# Patient Record
Sex: MALE | Race: WHITE | NOT HISPANIC OR LATINO | Employment: OTHER | URBAN - METROPOLITAN AREA
[De-identification: names, ages, dates, MRNs, and addresses within clinical notes are randomized per-mention and may not be internally consistent; named-entity substitution may affect disease eponyms.]

---

## 2017-03-28 NOTE — PATIENT INSTRUCTIONS
[facility]    If you have any questions regarding to your visit please contact your care team:       Team Red:   Clinic Hours Telephone Number   Dr. Dary Jones  (pediatrics)  Lyly Duarte NP 7am-7pm  Monday - Thursday   7am-5pm  Fridays  [phone] (fax)    Artemio CRAIG  [phone]   Urgent Care - [place] and [place] Monday-Friday  [place] - 11am-8pm  Saturday-Sunday  Both sites - 9am-5pm  [phone] - [facility]  [phone] - [place]       What options do I have for visits at the clinic other than the traditional office visit?  To expand how we care for you, many of our providers are utilizing electronic visits (e-visits) and telephone visits, when medically appropriate, for interactions with their patients rather than a visit in the clinic.   We also offer nurse visits for many medical concerns. Just like any other service, we will bill your insurance company for this type of visit based on time spent on the phone with your provider. Not all insurance companies cover these visits. Please check with your medical insurance if this type of visit is covered. You will be responsible for any charges that are not paid by your insurance.      E-visits via [employer]:  generally incur a $35.00 fee.  Telephone visits:  Time spent on the phone: *charged based on time that is spent on the phone in increments of 10 minutes. Estimated cost:   5-10 mins $30.00   11-20 mins. $59.00   21-30 mins. $85.00     As always, Thank you for trusting us with your health care needs!    Dante Jarrell    Preventive Health Recommendations:   Male Ages 65 and over    Yearly exam:             See your health care provider every year in order to  o   Review health changes.   o   Discuss preventive care.    o   Review your medicines if your doctor has prescribed any.    Talk with your health care provider about whether you should have a test to screen for  prostate cancer (PSA).    Every 3 years, have a diabetes test (fasting glucose). If you are at risk for diabetes, you should have this test more often.    Every 5 years, have a cholesterol test. Have this test more often if you are at risk for high cholesterol or heart disease.     Every 10 years, have a colonoscopy. Or, have a yearly FIT test (stool test). These exams will check for colon cancer.    Talk to with your health care provider about screening for Abdominal Aortic Aneurysm if you have a family history of AAA or have a history of smoking.    Shots:     Get a flu shot each year.     Get a tetanus shot every 10 years.     Talk to your doctor about your pneumonia vaccines. There are now two you should receive - Pneumovax (PPSV 23) and Prevnar (PCV 13).     Talk to your doctor about a shingles vaccine.     Talk to your doctor about the hepatitis B vaccine.  Nutrition:     Eat at least 5 servings of fruits and vegetables each day.     Eat whole-grain bread, whole-wheat pasta and brown rice instead of white grains and rice.     Talk to your provider about Calcium and Vitamin D.   Lifestyle    Exercise for at least 150 minutes a week (30 minutes a day, 5 days a week). This will help you control your weight and prevent disease.     Limit alcohol to one drink per day.     No smoking.     Wear sunscreen to prevent skin cancer.     See your dentist every six months for an exam and cleaning.     See your eye doctor every 1 to 2 years to screen for conditions such as glaucoma, macular degeneration, cataracts, etc   Preventive Health Recommendations:       Male Ages 65 and over    Yearly exam:             See your health care provider every year in order to  o   Review health changes.   o   Discuss preventive care.    o   Review your medicines if your doctor has prescribed any.  Talk with your health care provider about whether you should have a test to screen for prostate cancer (PSA).  Every 3 years, have a diabetes  test (fasting glucose). If you are at risk for diabetes, you should have this test more often.  Every 5 years, have a cholesterol test. Have this test more often if you are at risk for high cholesterol or heart disease.   Every 10 years, have a colonoscopy. Or, have a yearly FIT test (stool test). These exams will check for colon cancer.  Talk to with your health care provider about screening for Abdominal Aortic Aneurysm if you have a family history of AAA or have a history of smoking.  Shots:   Get a flu shot each year.   Get a tetanus shot every 10 years.   Talk to your doctor about your pneumonia vaccines. There are now two you should receive - Pneumovax (PPSV 23) and Prevnar (PCV 13).  Talk to your doctor about a shingles vaccine.   Talk to your doctor about the hepatitis B vaccine.  Nutrition:   Eat at least 5 servings of fruits and vegetables each day.   Eat whole-grain bread, whole-wheat pasta and brown rice instead of white grains and rice.   Talk to your doctor about Calcium and Vitamin D.   Lifestyle  Exercise for at least 150 minutes a week (30 minutes a day, 5 days a week). This will help you control your weight and prevent disease.   Limit alcohol to one drink per day.   No smoking.   Wear sunscreen to prevent skin cancer.   See your dentist every six months for an exam and cleaning.   See your eye doctor every 1 to 2 years to screen for conditions such as glaucoma, macular degeneration and cataracts.

## 2017-03-29 ENCOUNTER — OFFICE VISIT (OUTPATIENT)
Dept: FAMILY MEDICINE | Facility: CLINIC | Age: 67
End: 2017-03-29
Payer: COMMERCIAL

## 2017-03-29 VITALS
SYSTOLIC BLOOD PRESSURE: 124 MMHG | WEIGHT: 178 LBS | DIASTOLIC BLOOD PRESSURE: 80 MMHG | OXYGEN SATURATION: 100 % | BODY MASS INDEX: 23.59 KG/M2 | HEART RATE: 60 BPM | RESPIRATION RATE: 14 BRPM | HEIGHT: 73 IN | TEMPERATURE: 96.3 F

## 2017-03-29 DIAGNOSIS — N40.1 HYPERTROPHY OF PROSTATE WITH URINARY OBSTRUCTION: ICD-10-CM

## 2017-03-29 DIAGNOSIS — E78.5 HYPERLIPIDEMIA LDL GOAL <130: ICD-10-CM

## 2017-03-29 DIAGNOSIS — K21.9 GASTROESOPHAGEAL REFLUX DISEASE WITHOUT ESOPHAGITIS: ICD-10-CM

## 2017-03-29 DIAGNOSIS — Z00.00 ROUTINE GENERAL MEDICAL EXAMINATION AT A HEALTH CARE FACILITY: Primary | ICD-10-CM

## 2017-03-29 DIAGNOSIS — N13.8 HYPERTROPHY OF PROSTATE WITH URINARY OBSTRUCTION: ICD-10-CM

## 2017-03-29 DIAGNOSIS — I10 HYPERTENSION GOAL BP (BLOOD PRESSURE) < 140/90: ICD-10-CM

## 2017-03-29 LAB
ALBUMIN UR-MCNC: NEGATIVE MG/DL
ANION GAP SERPL CALCULATED.3IONS-SCNC: 2 MMOL/L (ref 3–14)
APPEARANCE UR: CLEAR
BILIRUB UR QL STRIP: NEGATIVE
BUN SERPL-MCNC: 23 MG/DL (ref 7–30)
CALCIUM SERPL-MCNC: 9.2 MG/DL (ref 8.5–10.1)
CHLORIDE SERPL-SCNC: 103 MMOL/L (ref 94–109)
CO2 SERPL-SCNC: 35 MMOL/L (ref 20–32)
COLOR UR AUTO: YELLOW
CREAT SERPL-MCNC: 0.84 MG/DL (ref 0.66–1.25)
GFR SERPL CREATININE-BSD FRML MDRD: ABNORMAL ML/MIN/1.7M2
GLUCOSE SERPL-MCNC: 107 MG/DL (ref 70–99)
GLUCOSE UR STRIP-MCNC: NEGATIVE MG/DL
HGB UR QL STRIP: NEGATIVE
KETONES UR STRIP-MCNC: NEGATIVE MG/DL
LEUKOCYTE ESTERASE UR QL STRIP: NEGATIVE
NITRATE UR QL: NEGATIVE
PH UR STRIP: 7 PH (ref 5–7)
POTASSIUM SERPL-SCNC: 4.3 MMOL/L (ref 3.4–5.3)
SODIUM SERPL-SCNC: 140 MMOL/L (ref 133–144)
SP GR UR STRIP: 1.01 (ref 1–1.03)
URN SPEC COLLECT METH UR: NORMAL
UROBILINOGEN UR STRIP-ACNC: 0.2 EU/DL (ref 0.2–1)

## 2017-03-29 PROCEDURE — 99397 PER PM REEVAL EST PAT 65+ YR: CPT | Performed by: FAMILY MEDICINE

## 2017-03-29 PROCEDURE — 80048 BASIC METABOLIC PNL TOTAL CA: CPT | Performed by: FAMILY MEDICINE

## 2017-03-29 PROCEDURE — 36415 COLL VENOUS BLD VENIPUNCTURE: CPT | Performed by: FAMILY MEDICINE

## 2017-03-29 PROCEDURE — 81003 URINALYSIS AUTO W/O SCOPE: CPT | Performed by: FAMILY MEDICINE

## 2017-03-29 RX ORDER — SIMVASTATIN 10 MG
10 TABLET ORAL AT BEDTIME
Qty: 90 TABLET | Refills: 3 | Status: SHIPPED | OUTPATIENT
Start: 2017-03-29 | End: 2018-03-20

## 2017-03-29 RX ORDER — ATENOLOL 25 MG/1
25 TABLET ORAL DAILY
Qty: 90 TABLET | Refills: 3 | Status: SHIPPED | OUTPATIENT
Start: 2017-03-29 | End: 2018-03-20

## 2017-03-29 RX ORDER — HYDROCHLOROTHIAZIDE 25 MG/1
25 TABLET ORAL DAILY
Qty: 90 TABLET | Refills: 3 | Status: SHIPPED | OUTPATIENT
Start: 2017-03-29 | End: 2018-03-20

## 2017-03-29 RX ORDER — POTASSIUM CHLORIDE 750 MG/1
10 TABLET, EXTENDED RELEASE ORAL DAILY
Qty: 90 TABLET | Refills: 3 | Status: SHIPPED | OUTPATIENT
Start: 2017-03-29 | End: 2018-03-20

## 2017-03-29 RX ORDER — FINASTERIDE 5 MG/1
5 TABLET, FILM COATED ORAL DAILY
Qty: 90 TABLET | Refills: 3 | Status: CANCELLED | OUTPATIENT
Start: 2017-03-29

## 2017-03-29 NOTE — MR AVS SNAPSHOT
After Visit Summary   3/29/2017    Steve José    MRN: [id number]           Patient Information     Date Of Birth          1950        Visit Information        Provider Department      3/29/2017 8:00 AM Bita Deluca MD [facility]        Today's Diagnoses     Hypertrophy of prostate with urinary obstruction        Hyperlipidemia LDL goal <130        Hypertension goal BP (blood pressure) < 140/90        Gastroesophageal reflux disease without esophagitis          Care Instructions    [facility]    If you have any questions regarding to your visit please contact your care team:       Team Red:   Clinic Hours Telephone Number   Dr. Dary Jones  (pediatrics)  Lyly Duarte NP 7am-7pm  Monday - Thursday   7am-5pm  Fridays  [phone] (fax)    Artemio CRAIG  [phone]   Urgent Care - [place] and [place] Monday-Friday  [place] - 11am-8pm  Saturday-Sunday  Both sites - 9am-5pm  [phone] - [facility]  [phone] - [place]       What options do I have for visits at the clinic other than the traditional office visit?  To expand how we care for you, many of our providers are utilizing electronic visits (e-visits) and telephone visits, when medically appropriate, for interactions with their patients rather than a visit in the clinic.   We also offer nurse visits for many medical concerns. Just like any other service, we will bill your insurance company for this type of visit based on time spent on the phone with your provider. Not all insurance companies cover these visits. Please check with your medical insurance if this type of visit is covered. You will be responsible for any charges that are not paid by your insurance.      E-visits via [employer]:  generally incur a $35.00 fee.  Telephone visits:  Time spent on the phone: *charged based on time that is spent on the phone in increments of 10 minutes.  Estimated cost:   5-10 mins $30.00   11-20 mins. $59.00   21-30 mins. $85.00     As always, Thank you for trusting us with your health care needs!    Dante Jarrell    Preventive Health Recommendations:   Male Ages 65 and over    Yearly exam:             See your health care provider every year in order to  o   Review health changes.   o   Discuss preventive care.    o   Review your medicines if your doctor has prescribed any.    Talk with your health care provider about whether you should have a test to screen for prostate cancer (PSA).    Every 3 years, have a diabetes test (fasting glucose). If you are at risk for diabetes, you should have this test more often.    Every 5 years, have a cholesterol test. Have this test more often if you are at risk for high cholesterol or heart disease.     Every 10 years, have a colonoscopy. Or, have a yearly FIT test (stool test). These exams will check for colon cancer.    Talk to with your health care provider about screening for Abdominal Aortic Aneurysm if you have a family history of AAA or have a history of smoking.    Shots:     Get a flu shot each year.     Get a tetanus shot every 10 years.     Talk to your doctor about your pneumonia vaccines. There are now two you should receive - Pneumovax (PPSV 23) and Prevnar (PCV 13).     Talk to your doctor about a shingles vaccine.     Talk to your doctor about the hepatitis B vaccine.  Nutrition:     Eat at least 5 servings of fruits and vegetables each day.     Eat whole-grain bread, whole-wheat pasta and brown rice instead of white grains and rice.     Talk to your provider about Calcium and Vitamin D.   Lifestyle    Exercise for at least 150 minutes a week (30 minutes a day, 5 days a week). This will help you control your weight and prevent disease.     Limit alcohol to one drink per day.     No smoking.     Wear sunscreen to prevent skin cancer.     See your dentist every six months for an exam and cleaning.     See  your eye doctor every 1 to 2 years to screen for conditions such as glaucoma, macular degeneration, cataracts, etc   Preventive Health Recommendations:       Male Ages 65 and over    Yearly exam:             See your health care provider every year in order to  o   Review health changes.   o   Discuss preventive care.    o   Review your medicines if your doctor has prescribed any.  Talk with your health care provider about whether you should have a test to screen for prostate cancer (PSA).  Every 3 years, have a diabetes test (fasting glucose). If you are at risk for diabetes, you should have this test more often.  Every 5 years, have a cholesterol test. Have this test more often if you are at risk for high cholesterol or heart disease.   Every 10 years, have a colonoscopy. Or, have a yearly FIT test (stool test). These exams will check for colon cancer.  Talk to with your health care provider about screening for Abdominal Aortic Aneurysm if you have a family history of AAA or have a history of smoking.  Shots:   Get a flu shot each year.   Get a tetanus shot every 10 years.   Talk to your doctor about your pneumonia vaccines. There are now two you should receive - Pneumovax (PPSV 23) and Prevnar (PCV 13).  Talk to your doctor about a shingles vaccine.   Talk to your doctor about the hepatitis B vaccine.  Nutrition:   Eat at least 5 servings of fruits and vegetables each day.   Eat whole-grain bread, whole-wheat pasta and brown rice instead of white grains and rice.   Talk to your doctor about Calcium and Vitamin D.   Lifestyle  Exercise for at least 150 minutes a week (30 minutes a day, 5 days a week). This will help you control your weight and prevent disease.   Limit alcohol to one drink per day.   No smoking.   Wear sunscreen to prevent skin cancer.   See your dentist every six months for an exam and cleaning.   See your eye doctor every 1 to 2 years to screen for conditions such as glaucoma, macular  "degeneration and cataracts.        Follow-ups after your visit        Who to contact     If you have questions or need follow up information about today's clinic visit or your schedule please contact [facility] [place] directly at [phone].  Normal or non-critical lab and imaging results will be communicated to you by MyChart, letter or phone within 4 business days after the clinic has received the results. If you do not hear from us within 7 days, please contact the clinic through [employer] or phone. If you have a critical or abnormal lab result, we will notify you by phone as soon as possible.  Submit refill requests through [employer] or call your pharmacy and they will forward the refill request to us. Please allow 3 business days for your refill to be completed.          Additional Information About Your Visit        [employer] Information     [employer] gives you secure access to your electronic health record. If you see a primary care provider, you can also send messages to your care team and make appointments. If you have questions, please call your primary care clinic.  If you do not have a primary care provider, please call [phone] and they will assist you.        Care EveryWhere ID     This is your Care EveryWhere ID. This could be used by other organizations to access your [place] medical records  [id number]        Your Vitals Were     Pulse Temperature Respirations Height Pulse Oximetry BMI (Body Mass Index)    60 96.3  F (35.7  C) 14 6' 1\" (1.854 m) 100% 23.48 kg/m2       Blood Pressure from Last 3 Encounters:   03/29/17 124/80   10/06/16 130/80   05/19/16 136/78    Weight from Last 3 Encounters:   03/29/17 178 lb (80.7 kg)   10/06/16 182 lb (82.6 kg)   04/04/16 180 lb (81.6 kg)              We Performed the Following     *UA reflex to Microscopic and Culture ([place] and [facility] (except [place] and [place])     Basic metabolic panel          Where to get your medicines      These " medications were sent to [facility] [id number]  [place], [place] - [address]  [address] [place] [facility] [address]     Phone:  [phone]     atenolol 25 MG tablet    hydrochlorothiazide 25 MG tablet    potassium chloride SA 10 MEQ CR tablet    ranitidine 150 MG tablet    simvastatin 10 MG tablet          Primary Care Provider Office Phone # Fax #    Bita Deluca [phone]       [facility] [address] [address]        Thank you!     Thank you for choosing [facility]  for your care. Our goal is always to provide you with excellent care. Hearing back from our patients is one way we can continue to improve our services. Please take a few minutes to complete the written survey that you may receive in the mail after your visit with us. Thank you!             Your Updated Medication List - Protect others around you: Learn how to safely use, store and throw away your medicines at www.disposemymeds.org.          This list is accurate as of: 3/29/17  8:30 AM.  Always use your most recent med list.                   Brand Name Dispense Instructions for use    aspirin 81 MG tablet      Take 1 tablet by mouth daily.       atenolol 25 MG tablet    TENORMIN    90 tablet    Take 1 tablet (25 mg) by mouth daily       CALCIUM 600 + D PO      Take  by mouth daily.       finasteride 5 MG tablet    PROSCAR    90 tablet    Take 1 tablet (5 mg) by mouth daily       hydrochlorothiazide 25 MG tablet    HYDRODIURIL    90 tablet    Take 1 tablet (25 mg) by mouth daily       MULTI FOR HIM PO      Take  by mouth daily.       potassium chloride SA 10 MEQ CR tablet    K-DUR/KLOR-CON M    90 tablet    Take 1 tablet (10 mEq) by mouth daily       ranitidine 150 MG tablet    ZANTAC    180 tablet    Take 1 tablet (150 mg) by mouth 2 times daily       simvastatin 10 MG tablet    ZOCOR    90 tablet    Take 1 tablet (10 mg) by mouth At Bedtime at bedtime.

## 2017-03-29 NOTE — PROGRESS NOTES
SUBJECTIVE:                                                            Steve José is a 66 year old male who presents for Preventive Visit.      Are you in the first 12 months of your Medicare coverage?  No    Physical   Annual:     Getting at least 3 servings of Calcium per day::  Yes    Bi-annual eye exam::  Yes    Dental care twice a year::  Yes    Sleep apnea or symptoms of sleep apnea::  None    Diet::  Regular (no restrictions)    Frequency of exercise::  6-7 days/week    Duration of exercise::  Greater than 60 minutes    Taking medications regularly::  Yes    Medication side effects::  None    Additional concerns today::  No      COGNITIVE SCREEN  1) Repeat 3 items (Banana, Sunrise, Chair)    2) Clock draw: NORMAL  3) 3 item recall: Recalls 3 objects  Results: NORMAL clock, 1-2 items recalled: COGNITIVE IMPAIRMENT LESS LIKELY    Mini-CogTM Copyright RELL Mayes. Licensed by the author for use in [employer]; reprinted with permission (anirudh@[facility]). All rights reserved.        Reviewed and updated as needed this visit by clinical staff         Reviewed and updated as needed this visit by Provider        Social History   Substance Use Topics     Smoking status: Never Smoker     Smokeless tobacco: Never Used     Alcohol use 0.0 oz/week      Comment: rare       The patient does not drink >3 drinks per day nor >7 drinks per week.    Hyperlipidemia Follow-Up      Rate your low fat/cholesterol diet?: good    Taking statin?  Yes, no muscle aches from statin    Other lipid medications/supplements?:  none     Hypertension Follow-up      Outpatient blood pressures are not being checked.    Low Salt Diet: no added salt       Amount of exercise or physical activity: None    Problems taking medications regularly: No    Medication side effects: none    Diet: low salt        none    Today's PHQ-2 Score:   PHQ-2 ( 1999 Pfizer) 3/26/2017   Little interest or pleasure in doing things Not at all   Feeling down,  depressed or hopeless Not at all   PHQ-2 Score 0       Do you feel safe in your environment - Yes    Do you have a Health Care Directive?: Yes: Patient states has Advance Directive and will bring in a copy to clinic.    Current providers sharing in care for this patient include:   Patient Care Team:  Bita Deluca MD as PCP - General      Hearing impairment: No    Ability to successfully perform activities of daily living: Yes, no assistance needed     Fall risk:       Home safety:  none identified      The following health maintenance items are reviewed in Epic and correct as of today:  Health Maintenance   Topic Date Due     AORTIC ANEURYSM SCREENING (SYSTEM ASSIGNED)  05/09/2013     ADVANCE DIRECTIVE PLANNING Q5 YRS (NO INBASKET)  08/04/2016     INFLUENZA VACCINE (SYSTEM ASSIGNED)  09/01/2017     BMP Q1 YR (NO INBASKET)  10/06/2017     FALL RISK ASSESSMENT  10/06/2017     LIPID MONITORING Q1 YEAR( NO INBASKET)  10/06/2017     TETANUS IMMUNIZATION (SYSTEM ASSIGNED)  12/05/2017     COLON CANCER SCREEN (SYSTEM ASSIGNED)  01/08/2018     PNEUMOCOCCAL  Completed     HEPATITIS C SCREENING  Completed              ROS:  C: NEGATIVE for fever, chills, change in weight  I: NEGATIVE for worrisome rashes, moles or lesions  E: NEGATIVE for vision changes or irritation  E/M: NEGATIVE for ear, mouth and throat problems  R: NEGATIVE for significant cough or SOB  B: NEGATIVE for masses, tenderness or discharge  CV: NEGATIVE for chest pain, palpitations or peripheral edema  GI: NEGATIVE for nausea, abdominal pain, heartburn, or change in bowel habits  : NEGATIVE for frequency, dysuria, or hematuria  M: NEGATIVE for significant arthralgias or myalgia  N: NEGATIVE for weakness, dizziness or paresthesias  E: NEGATIVE for temperature intolerance, skin/hair changes  H: NEGATIVE for bleeding problems  P: NEGATIVE for changes in mood or affect    Problem list, Medication list, Allergies, and Medical/Social/Surgical histories reviewed in  EPIC and updated as appropriate.  Labs reviewed in EPIC  BP Readings from Last 3 Encounters:   03/29/17 124/80   10/06/16 130/80   05/19/16 136/78    Wt Readings from Last 3 Encounters:   03/29/17 178 lb (80.7 kg)   10/06/16 182 lb (82.6 kg)   04/04/16 180 lb (81.6 kg)                  Patient Active Problem List   Diagnosis     GERD (gastroesophageal reflux disease)     Hyperlipidemia LDL goal <130     Hypertension goal BP (blood pressure) < 140/90     Advanced directives, counseling/discussion     Hypertrophy of prostate with urinary obstruction     Past Surgical History:   Procedure Laterality Date     APPENDECTOMY       C GENITAL SURG PROC,MALE UNLISTED  2001    left hydrocele     C GENITAL SURG PROC,MALE UNLISTED  1977    right ureteral reimplantation     LASIK  2005     TURP  1998    x 2       Social History   Substance Use Topics     Smoking status: Never Smoker     Smokeless tobacco: Never Used     Alcohol use 0.0 oz/week      Comment: rare     Family History   Problem Relation Age of Onset     C.A.D. Father 64     d. MI     Hypertension Father      Hypertension Mother      CANCER Mother      skin     DIABETES No family hx of      Cancer - colorectal No family hx of          Current Outpatient Prescriptions   Medication Sig Dispense Refill     simvastatin (ZOCOR) 10 MG tablet Take 1 tablet (10 mg) by mouth At Bedtime at bedtime. 90 tablet 3     atenolol (TENORMIN) 25 MG tablet Take 1 tablet (25 mg) by mouth daily 90 tablet 3     hydrochlorothiazide (HYDRODIURIL) 25 MG tablet Take 1 tablet (25 mg) by mouth daily 90 tablet 3     potassium chloride SA (K-DUR/KLOR-CON M) 10 MEQ CR tablet Take 1 tablet (10 mEq) by mouth daily 90 tablet 3     ranitidine (ZANTAC) 150 MG tablet Take 1 tablet (150 mg) by mouth 2 times daily 180 tablet 3     finasteride (PROSCAR) 5 MG tablet Take 1 tablet (5 mg) by mouth daily 90 tablet 3     aspirin 81 MG tablet Take 1 tablet by mouth daily.       Multiple Vitamins-Minerals (MULTI  FOR HIM PO) Take  by mouth daily.       Calcium Carbonate-Vitamin D (CALCIUM 600 + D OR) Take  by mouth daily.       [DISCONTINUED] simvastatin (ZOCOR) 10 MG tablet Take 1 tablet (10 mg) by mouth At Bedtime at bedtime. 90 tablet 3     [DISCONTINUED] atenolol (TENORMIN) 25 MG tablet Take 1 tablet (25 mg) by mouth daily 90 tablet 3     [DISCONTINUED] hydrochlorothiazide (HYDRODIURIL) 25 MG tablet Take 1 tablet (25 mg) by mouth daily 90 tablet 3     [DISCONTINUED] potassium chloride SA (K-DUR,KLOR-CON M) 10 MEQ tablet Take 1 tablet (10 mEq) by mouth daily 90 tablet 3     [DISCONTINUED] ranitidine (ZANTAC) 150 MG tablet Take 1 tablet (150 mg) by mouth 2 times daily 180 tablet 3     No Known Allergies  Recent Labs   Lab Test  10/06/16   0843  04/04/16   0833  10/08/15   0927  04/17/15   0822  10/23/14   0924   10/15/13   0806   11/02/10   0938   12/08/09   0858   A1C   --    --    --   5.8   --    --    --    --    --    --    --    LDL  71   --   62   --   62   --   64   < >  87   < >  72   HDL  57   --   54   --   56   --   41   < >  42   < >  45   TRIG  55   --   46   --   56   --   92   < >  76   < >  62   ALT  29   --    --    --   25   --   31   < >  33   < >  37   CR  0.83  0.78  0.83  0.80   --    < >  0.81   < >  0.82   < >  0.91   GFRESTIMATED  >90  Non  GFR Calc    >90  Non  GFR Calc    >90  Non  GFR Calc    >90  Non  GFR Calc     --    < >  >90   < >  >90   < >  85   GFRESTBLACK  >90   GFR Calc    >90   GFR Calc    >90   GFR Calc    >90   GFR Calc     --    < >  >90   < >  >90   < >  >90   POTASSIUM  4.2  4.2  3.9  3.8   --    < >  3.9   < >  4.4   < >  4.1   TSH   --    --    --    --    --    --    --    --   1.15   --   1.31    < > = values in this interval not displayed.      OBJECTIVE:                                                            There were no vitals taken for this  "visit. Estimated body mass index is 24.01 kg/(m^2) as calculated from the following:    Height as of 10/6/16: 6' 1\" (1.854 m).    Weight as of 10/6/16: 182 lb (82.6 kg).  EXAM:   GENERAL: healthy, alert and no distress  EYES: Eyes grossly normal to inspection, PERRL and conjunctivae and sclerae normal  HENT: ear canals and TM's normal, nose and mouth without ulcers or lesions  NECK: no adenopathy, no asymmetry, masses, or scars and thyroid normal to palpation  RESP: lungs clear to auscultation - no rales, rhonchi or wheezes  CV: regular rate and rhythm, normal S1 S2, no S3 or S4, no murmur, click or rub, no peripheral edema and peripheral pulses strong  ABDOMEN: soft, nontender, no hepatosplenomegaly, no masses and bowel sounds normal  MS: no gross musculoskeletal defects noted, no edema  SKIN: no suspicious lesions or rashes  NEURO: Normal strength and tone, mentation intact and speech normal  PSYCH: mentation appears normal, affect normal/bright    ASSESSMENT / PLAN:                                                            1. Routine general medical examination at a health care facility  Normal Exam    2. Hyperlipidemia LDL goal <130  controlled  - simvastatin (ZOCOR) 10 MG tablet; Take 1 tablet (10 mg) by mouth At Bedtime at bedtime.  Dispense: 90 tablet; Refill: 3    3. Hypertension goal BP (blood pressure) < 140/90  controlled  - atenolol (TENORMIN) 25 MG tablet; Take 1 tablet (25 mg) by mouth daily  Dispense: 90 tablet; Refill: 3  - hydrochlorothiazide (HYDRODIURIL) 25 MG tablet; Take 1 tablet (25 mg) by mouth daily  Dispense: 90 tablet; Refill: 3  - potassium chloride SA (K-DUR/KLOR-CON M) 10 MEQ CR tablet; Take 1 tablet (10 mEq) by mouth daily  Dispense: 90 tablet; Refill: 3  - Basic metabolic panel  - *UA reflex to Microscopic and Culture ([facility] (except [place] and [place])    4. Gastroesophageal reflux disease without esophagitis  Stable   - ranitidine (ZANTAC) 150 MG tablet; " "Take 1 tablet (150 mg) by mouth 2 times daily  Dispense: 180 tablet; Refill: 3    5. Hypertrophy of prostate with urinary obstruction  Pt sees Urology and is making a follow up appointment       End of Life Planning:  Patient currently has an advanced directive: No.  I have verified the patient's ablity to prepare an advanced directive/make health care decisions.  Literature was provided to assist patient in preparing an advanced directive.    COUNSELING:  Reviewed preventive health counseling, as reflected in patient instructions       Regular exercise       Healthy diet/nutrition       Vision screening       Hearing screening       Dental care       Prostate cancer screening       Osteoporosis Prevention/Bone Health       The 10-year ASCVD risk score (Daron DAVIS Jr, et al., 2013) is: 13.3%    Values used to calculate the score:      Age: 66 years      Sex: Male      Is Non- : No      Diabetic: No      Tobacco smoker: No      Systolic Blood Pressure: 124 mmHg      Is BP treated: Yes      HDL Cholesterol: 57 mg/dL      Total Cholesterol: 139 mg/dL        Estimated body mass index is 24.01 kg/(m^2) as calculated from the following:    Height as of 10/6/16: 6' 1\" (1.854 m).    Weight as of 10/6/16: 182 lb (82.6 kg).     reports that he has never smoked. He has never used smokeless tobacco.      Appropriate preventive services were discussed with this patient, including applicable screening as appropriate for cardiovascular disease, diabetes, osteopenia/osteoporosis, and glaucoma.  As appropriate for age/gender, discussed screening for colorectal cancer, prostate cancer, breast cancer, and cervical cancer. Checklist reviewing preventive services available has been given to the patient.    Reviewed patients plan of care and provided an AVS. The Intermediate Care Plan ( asthma action plan, low back pain action plan, and migraine action plan) for Steve meets the Care Plan requirement. This Care Plan " has been established and reviewed with the Patient.    Counseling Resources:  ATP IV Guidelines  Pooled Cohorts Equation Calculator  Breast Cancer Risk Calculator  FRAX Risk Assessment  ICSI Preventive Guidelines  Dietary Guidelines for Americans, 2010  USDA's MyPlate  ASA Prophylaxis  Lung CA Screening    Bita Deluca MD  [facility] [place]  Answers for HPI/ROS submitted by the patient on 3/26/2017   Q1: Little interest or pleasure in doing things: 0=Not at all  Q2: Feeling down, depressed or hopeless: 0=Not at all  PHQ-2 Score: 0

## 2017-03-29 NOTE — NURSING NOTE
"Chief Complaint   Patient presents with     Wellness Visit       Initial /80  Pulse 60  Temp 96.3  F (35.7  C)  Resp 14  Ht 6' 1\" (1.854 m)  Wt 178 lb (80.7 kg)  SpO2 100%  BMI 23.48 kg/m2 Estimated body mass index is 23.48 kg/(m^2) as calculated from the following:    Height as of this encounter: 6' 1\" (1.854 m).    Weight as of this encounter: 178 lb (80.7 kg).  Medication Reconciliation: complete     Marily Minaya. MA      "

## 2017-04-19 ENCOUNTER — OFFICE VISIT (OUTPATIENT)
Dept: FAMILY MEDICINE | Facility: CLINIC | Age: 67
End: 2017-04-19
Payer: COMMERCIAL

## 2017-04-19 VITALS
DIASTOLIC BLOOD PRESSURE: 80 MMHG | WEIGHT: 179 LBS | OXYGEN SATURATION: 98 % | BODY MASS INDEX: 23.72 KG/M2 | SYSTOLIC BLOOD PRESSURE: 120 MMHG | HEART RATE: 65 BPM | RESPIRATION RATE: 14 BRPM | HEIGHT: 73 IN | TEMPERATURE: 97 F

## 2017-04-19 DIAGNOSIS — N45.1 EPIDIDYMITIS: ICD-10-CM

## 2017-04-19 DIAGNOSIS — R21 RASH: Primary | ICD-10-CM

## 2017-04-19 PROCEDURE — 99213 OFFICE O/P EST LOW 20 MIN: CPT | Performed by: FAMILY MEDICINE

## 2017-04-19 RX ORDER — CLOTRIMAZOLE AND BETAMETHASONE DIPROPIONATE 10; .64 MG/G; MG/G
CREAM TOPICAL 2 TIMES DAILY
Qty: 15 G | Refills: 1 | Status: SHIPPED | OUTPATIENT
Start: 2017-04-19 | End: 2017-09-26

## 2017-04-19 RX ORDER — SULFAMETHOXAZOLE/TRIMETHOPRIM 800-160 MG
1 TABLET ORAL 2 TIMES DAILY
Qty: 20 TABLET | Refills: 0 | Status: SHIPPED | OUTPATIENT
Start: 2017-04-19 | End: 2017-04-29

## 2017-04-19 NOTE — PATIENT INSTRUCTIONS
[facility]    If you have any questions regarding to your visit please contact your care team:       Team Red:   Clinic Hours Telephone Number   Dr. Dary Jones  (pediatrics)  Lyly Duarte NP 7am-7pm  Monday - Thursday   7am-5pm  Fridays  [phone] (fax)    Artemio CRAIG  [phone]   Urgent Care - [place] and [place] Monday-Friday  [place] - 11am-8pm  Saturday-Sunday  Both sites - 9am-5pm  [phone] - [facility]  [phone] - [place]       What options do I have for visits at the clinic other than the traditional office visit?  To expand how we care for you, many of our providers are utilizing electronic visits (e-visits) and telephone visits, when medically appropriate, for interactions with their patients rather than a visit in the clinic.   We also offer nurse visits for many medical concerns. Just like any other service, we will bill your insurance company for this type of visit based on time spent on the phone with your provider. Not all insurance companies cover these visits. Please check with your medical insurance if this type of visit is covered. You will be responsible for any charges that are not paid by your insurance.      E-visits via [facility]:  generally incur a $35.00 fee.  Telephone visits:  Time spent on the phone: *charged based on time that is spent on the phone in increments of 10 minutes. Estimated cost:   5-10 mins $30.00   11-20 mins. $59.00   21-30 mins. $85.00     As always, Thank you for trusting us with your health care needs!

## 2017-04-19 NOTE — MR AVS SNAPSHOT
After Visit Summary   4/19/2017    Steve José    MRN: [id number]           Patient Information     Date Of Birth          1950        Visit Information        Provider Department      4/19/2017 7:00 AM Bita Deluca MD [facility]        Today's Diagnoses     Rash    -  1    Epididymitis          Care Instructions    [facility]    If you have any questions regarding to your visit please contact your care team:       Team Red:   Clinic Hours Telephone Number   Dr. Dary Jones  (pediatrics)  Lyly Duarte NP 7am-7pm  Monday - Thursday   7am-5pm  Fridays  [phone] (fax)    Artemio CRAIG  [phone]   Urgent Care - [place] and [place] Monday-Friday  [place] - 11am-8pm  Saturday-Sunday  Both sites - 9am-5pm  [phone] - [facility]  [phone] [facility]       What options do I have for visits at the clinic other than the traditional office visit?  To expand how we care for you, many of our providers are utilizing electronic visits (e-visits) and telephone visits, when medically appropriate, for interactions with their patients rather than a visit in the clinic.   We also offer nurse visits for many medical concerns. Just like any other service, we will bill your insurance company for this type of visit based on time spent on the phone with your provider. Not all insurance companies cover these visits. Please check with your medical insurance if this type of visit is covered. You will be responsible for any charges that are not paid by your insurance.      E-visits via [employer]:  generally incur a $35.00 fee.  Telephone visits:  Time spent on the phone: *charged based on time that is spent on the phone in increments of 10 minutes. Estimated cost:   5-10 mins $30.00   11-20 mins. $59.00   21-30 mins. $85.00     As always, Thank you for trusting us with your health care needs!                     "Follow-ups after your visit        Your next 10 appointments already scheduled     May 01, 2017  9:15 AM CDT   New Visit with Semaj Sotelo MD   [facility] [place] ([facility] [place]  [place] [address] [phone]              Who to contact     If you have questions or need follow up information about today's clinic visit or your schedule please contact [facility] [place] directly at [phone].  Normal or non-critical lab and imaging results will be communicated to you by [employer], letter or phone within 4 business days after the clinic has received the results. If you do not hear from us within 7 days, please contact the clinic through AFTER-MOUSEt or phone. If you have a critical or abnormal lab result, we will notify you by phone as soon as possible.  Submit refill requests through [employer] or call your pharmacy and they will forward the refill request to us. Please allow 3 business days for your refill to be completed.          Additional Information About Your Visit        [employer] Weekend Information     [employer] gives you secure access to your electronic health record. If you see a primary care provider, you can also send messages to your care team and make appointments. If you have questions, please call your primary care clinic.  If you do not have a primary care provider, please call [phone] and they will assist you.        Care EveryWhere ID     This is your Care EveryWhere ID. This could be used by other organizations to access your [facility] medical records  [id number]        Your Vitals Were     Pulse Temperature Respirations Height Pulse Oximetry BMI (Body Mass Index)    65 97  F (36.1  C) 14 6' 1\" (1.854 m) 98% 23.62 kg/m2       Blood Pressure from Last 3 Encounters:   04/19/17 120/80   03/29/17 124/80   10/06/16 130/80    Weight from Last 3 Encounters:   04/19/17 179 lb (81.2 kg)   03/29/17 178 lb (80.7 kg)   10/06/16 182 lb (82.6 kg)            "   Today, you had the following     No orders found for display         Today's Medication Changes          These changes are accurate as of: 4/19/17  7:35 AM.  If you have any questions, ask your nurse or doctor.               Start taking these medicines.        Dose/Directions    clotrimazole-betamethasone cream   Commonly known as:  LOTRISONE   Used for:  Rash   Started by:  Bita Deluca MD        Apply topically 2 times daily   Quantity:  15 g   Refills:  1       sulfamethoxazole-trimethoprim 800-160 MG per tablet   Commonly known as:  BACTRIM DS/SEPTRA DS   Used for:  Epididymitis   Started by:  Bita Deluca MD        Dose:  1 tablet   Take 1 tablet by mouth 2 times daily for 10 days   Quantity:  20 tablet   Refills:  0            Where to get your medicines      These medications were sent to [facility] - [place] - [address] [place] [address]  [address] [facility] [address]     Phone:  [phone]     clotrimazole-betamethasone cream         Some of these will need a paper prescription and others can be bought over the counter.  Ask your nurse if you have questions.     Bring a paper prescription for each of these medications     sulfamethoxazole-trimethoprim 800-160 MG per tablet                Primary Care Provider Office Phone # Fax #    Bita Deluca [phone]       [address] [address]        Thank you!     Thank you for choosing [facility]  for your care. Our goal is always to provide you with excellent care. Hearing back from our patients is one way we can continue to improve our services. Please take a few minutes to complete the written survey that you may receive in the mail after your visit with us. Thank you!             Your Updated Medication List - Protect others around you: Learn how to safely use, store and throw away your medicines at www.disposemymeds.org.          This list is accurate  as of: 4/19/17  7:35 AM.  Always use your most recent med list.                   Brand Name Dispense Instructions for use    aspirin 81 MG tablet      Take 1 tablet by mouth daily.       atenolol 25 MG tablet    TENORMIN    90 tablet    Take 1 tablet (25 mg) by mouth daily       CALCIUM 600 + D PO      Take  by mouth daily.       clotrimazole-betamethasone cream    LOTRISONE    15 g    Apply topically 2 times daily       finasteride 5 MG tablet    PROSCAR    90 tablet    Take 1 tablet (5 mg) by mouth daily       hydrochlorothiazide 25 MG tablet    HYDRODIURIL    90 tablet    Take 1 tablet (25 mg) by mouth daily       MULTI FOR HIM PO      Take  by mouth daily.       potassium chloride SA 10 MEQ CR tablet    K-DUR/KLOR-CON M    90 tablet    Take 1 tablet (10 mEq) by mouth daily       ranitidine 150 MG tablet    ZANTAC    180 tablet    Take 1 tablet (150 mg) by mouth 2 times daily       simvastatin 10 MG tablet    ZOCOR    90 tablet    Take 1 tablet (10 mg) by mouth At Bedtime at bedtime.       sulfamethoxazole-trimethoprim 800-160 MG per tablet    BACTRIM DS/SEPTRA DS    20 tablet    Take 1 tablet by mouth 2 times daily for 10 days

## 2017-04-19 NOTE — NURSING NOTE
"Chief Complaint   Patient presents with     Derm Problem     itchy down groin 8-9 day       Initial /80  Pulse 65  Temp 97  F (36.1  C)  Resp 14  Ht 6' 1\" (1.854 m)  Wt 179 lb (81.2 kg)  SpO2 98%  BMI 23.62 kg/m2 Estimated body mass index is 23.62 kg/(m^2) as calculated from the following:    Height as of this encounter: 6' 1\" (1.854 m).    Weight as of this encounter: 179 lb (81.2 kg).  Medication Reconciliation: complete     Marily Minaya. MA      "

## 2017-04-19 NOTE — PROGRESS NOTES
SUBJECTIVE:                                                    Steve José is a 66 year old male who presents to clinic today for the following health issues:      Chief Complaint   Patient presents with     Derm Problem     itchy down groin 8-9 day    Pt has a scratchy Feeling Left side Groin  Mild tenderness upper part left Testicle  Has a History of Hydrocele many years ago and had surgery.  He had been working in his lawn  No Urinary Problems          Problem list and histories reviewed & adjusted, as indicated.  Additional history: as documented    Patient Active Problem List   Diagnosis     GERD (gastroesophageal reflux disease)     Hyperlipidemia LDL goal <130     Hypertension goal BP (blood pressure) < 140/90     Advanced directives, counseling/discussion     Hypertrophy of prostate with urinary obstruction     Past Surgical History:   Procedure Laterality Date     APPENDECTOMY       C GENITAL SURG PROC,MALE UNLISTED  2001    left hydrocele     C GENITAL SURG PROC,MALE UNLISTED  1977    right ureteral reimplantation     LASIK  2005     TURP  1998    x 2       Social History   Substance Use Topics     Smoking status: Never Smoker     Smokeless tobacco: Never Used     Alcohol use 0.0 oz/week      Comment: rare     Family History   Problem Relation Age of Onset     C.A.D. Father 64     d. MI     Hypertension Father      Hypertension Mother      CANCER Mother      skin     DIABETES No family hx of      Cancer - colorectal No family hx of          Current Outpatient Prescriptions   Medication Sig Dispense Refill     clotrimazole-betamethasone (LOTRISONE) cream Apply topically 2 times daily 15 g 1     sulfamethoxazole-trimethoprim (BACTRIM DS/SEPTRA DS) 800-160 MG per tablet Take 1 tablet by mouth 2 times daily for 10 days 20 tablet 0     simvastatin (ZOCOR) 10 MG tablet Take 1 tablet (10 mg) by mouth At Bedtime at bedtime. 90 tablet 3     atenolol (TENORMIN) 25 MG tablet Take 1 tablet (25 mg) by mouth daily  90 tablet 3     hydrochlorothiazide (HYDRODIURIL) 25 MG tablet Take 1 tablet (25 mg) by mouth daily 90 tablet 3     potassium chloride SA (K-DUR/KLOR-CON M) 10 MEQ CR tablet Take 1 tablet (10 mEq) by mouth daily 90 tablet 3     ranitidine (ZANTAC) 150 MG tablet Take 1 tablet (150 mg) by mouth 2 times daily 180 tablet 3     finasteride (PROSCAR) 5 MG tablet Take 1 tablet (5 mg) by mouth daily 90 tablet 3     aspirin 81 MG tablet Take 1 tablet by mouth daily.       Multiple Vitamins-Minerals (MULTI FOR HIM PO) Take  by mouth daily.       Calcium Carbonate-Vitamin D (CALCIUM 600 + D OR) Take  by mouth daily.       No Known Allergies  Recent Labs   Lab Test  03/29/17   0833  10/06/16   0843   10/08/15   0927  04/17/15   0822  10/23/14   0924   10/15/13   0806   11/02/10   0938   12/08/09   0858   A1C   --    --    --    --   5.8   --    --    --    --    --    --    --    LDL   --   71   --   62   --   62   --   64   < >  87   < >  72   HDL   --   57   --   54   --   56   --   41   < >  42   < >  45   TRIG   --   55   --   46   --   56   --   92   < >  76   < >  62   ALT   --   29   --    --    --   25   --   31   < >  33   < >  37   CR  0.84  0.83   < >  0.83  0.80   --    < >  0.81   < >  0.82   < >  0.91   GFRESTIMATED  >90  Non  GFR Calc    >90  Non  GFR Calc     < >  >90  Non  GFR Calc    >90  Non  GFR Calc     --    < >  >90   < >  >90   < >  85   GFRESTBLACK  >90   GFR Calc    >90   GFR Calc     < >  >90   GFR Calc    >90   GFR Calc     --    < >  >90   < >  >90   < >  >90   POTASSIUM  4.3  4.2   < >  3.9  3.8   --    < >  3.9   < >  4.4   < >  4.1   TSH   --    --    --    --    --    --    --    --    --   1.15   --   1.31    < > = values in this interval not displayed.      BP Readings from Last 3 Encounters:   04/19/17 120/80   03/29/17 124/80   10/06/16 130/80    Wt Readings from  "Last 3 Encounters:   04/19/17 179 lb (81.2 kg)   03/29/17 178 lb (80.7 kg)   10/06/16 182 lb (82.6 kg)                  Labs reviewed in EPIC    Reviewed and updated as needed this visit by clinical staff  Tobacco  Allergies  Meds  Problems  Med Hx  Surg Hx  Fam Hx  Soc Hx        Reviewed and updated as needed this visit by Provider         ROS:  C: NEGATIVE for fever, chills, change in weight  GI: NEGATIVE for nausea, abdominal pain, heartburn, or change in bowel habits  : as above    OBJECTIVE:                                                    /80  Pulse 65  Temp 97  F (36.1  C)  Resp 14  Ht 6' 1\" (1.854 m)  Wt 179 lb (81.2 kg)  SpO2 98%  BMI 23.62 kg/m2  Body mass index is 23.62 kg/(m^2).  GENERAL: healthy, alert and no distress  ABDOMEN: soft, nontender, no hepatosplenomegaly, no masses and bowel sounds normal   (male): normal male genitalia without lesions or urethral discharge, no hernia  Mild scratching marks Left Groin  Mild tenderness Left epididymis    Diagnostic Test Results:  none      ASSESSMENT/PLAN:                                                      1. Rash  ? intertrigo  - clotrimazole-betamethasone (LOTRISONE) cream; Apply topically 2 times daily  Dispense: 15 g  Use very sparingly  2. Epididymitis  ?  - sulfamethoxazole-trimethoprim (BACTRIM DS/SEPTRA DS) 800-160 MG per tablet; Take 1 tablet by mouth 2 times daily for 10 days  Dispense: 20 tablet; Refill: 0  Pt has a appointment with Urology in 2 weeks  Follow up if worse  Ultrasound if not better      Bita Deluca MD  [facility]   patientAbdomen  "

## 2017-05-01 ENCOUNTER — OFFICE VISIT (OUTPATIENT)
Dept: UROLOGY | Facility: CLINIC | Age: 67
End: 2017-05-01
Payer: COMMERCIAL

## 2017-05-01 VITALS — HEART RATE: 70 BPM | DIASTOLIC BLOOD PRESSURE: 68 MMHG | RESPIRATION RATE: 14 BRPM | SYSTOLIC BLOOD PRESSURE: 118 MMHG

## 2017-05-01 DIAGNOSIS — N13.8 HYPERTROPHY OF PROSTATE WITH URINARY OBSTRUCTION: Primary | ICD-10-CM

## 2017-05-01 DIAGNOSIS — R97.20 ELEVATED PROSTATE SPECIFIC ANTIGEN (PSA): ICD-10-CM

## 2017-05-01 DIAGNOSIS — N40.1 HYPERTROPHY OF PROSTATE WITH URINARY OBSTRUCTION: Primary | ICD-10-CM

## 2017-05-01 LAB — PSA SERPL-MCNC: 8 UG/L (ref 0–4)

## 2017-05-01 PROCEDURE — 84153 ASSAY OF PSA TOTAL: CPT | Performed by: UROLOGY

## 2017-05-01 PROCEDURE — 51798 US URINE CAPACITY MEASURE: CPT | Performed by: UROLOGY

## 2017-05-01 PROCEDURE — 36415 COLL VENOUS BLD VENIPUNCTURE: CPT | Performed by: UROLOGY

## 2017-05-01 PROCEDURE — 99214 OFFICE O/P EST MOD 30 MIN: CPT | Mod: 25 | Performed by: UROLOGY

## 2017-05-01 RX ORDER — FINASTERIDE 5 MG/1
5 TABLET, FILM COATED ORAL DAILY
Qty: 90 TABLET | Refills: 3 | Status: SHIPPED | OUTPATIENT
Start: 2017-05-01 | End: 2018-05-07

## 2017-05-01 NOTE — PROGRESS NOTES
Chief Complaint   Patient presents with     Benign Prostatic Hypertrophy       Steve José is a 66 year old male who presents today for follow up of   Chief Complaint   Patient presents with     Benign Prostatic Hypertrophy    f/u for benign prostatic hyperplasia/elevated psa.  He had turp x 2 in the past.  He is currently on proscar for large prostate.  Previous urodynamics showed detrusor compromise per patient.  He has been seeing Dr. Ruggiero for the last several years.  His urinary flow is ok.  He voids q 2 hour during daytime and q 3 hour nightly.  He also c/o rash on his scrotum recently.    Current Outpatient Prescriptions   Medication Sig Dispense Refill     finasteride (PROSCAR) 5 MG tablet Take 1 tablet (5 mg) by mouth daily 90 tablet 3     clotrimazole-betamethasone (LOTRISONE) cream Apply topically 2 times daily 15 g 1     simvastatin (ZOCOR) 10 MG tablet Take 1 tablet (10 mg) by mouth At Bedtime at bedtime. 90 tablet 3     atenolol (TENORMIN) 25 MG tablet Take 1 tablet (25 mg) by mouth daily 90 tablet 3     hydrochlorothiazide (HYDRODIURIL) 25 MG tablet Take 1 tablet (25 mg) by mouth daily 90 tablet 3     potassium chloride SA (K-DUR/KLOR-CON M) 10 MEQ CR tablet Take 1 tablet (10 mEq) by mouth daily 90 tablet 3     ranitidine (ZANTAC) 150 MG tablet Take 1 tablet (150 mg) by mouth 2 times daily 180 tablet 3     aspirin 81 MG tablet Take 1 tablet by mouth daily.       Multiple Vitamins-Minerals (MULTI FOR HIM PO) Take  by mouth daily.       Calcium Carbonate-Vitamin D (CALCIUM 600 + D OR) Take  by mouth daily.       No Known Allergies   Past Medical History:   Diagnosis Date     BPH (benign prostatic hypertrophy)      Chronic low back pain 2001     Colon polyp 2000     Hyperlipidemia      Hypertension      Prostatitis     recurrent     Past Surgical History:   Procedure Laterality Date     APPENDECTOMY       C GENITAL SURG PROC,MALE UNLISTED  2001    left hydrocele     C GENITAL SURG PROC,MALE UNLISTED   1977    right ureteral reimplantation     LASIK  2005     TURP  1998    x 2     Family History   Problem Relation Age of Onset     C.A.D. Father 64     d. MI     Hypertension Father      Hypertension Mother      CANCER Mother      skin     DIABETES No family hx of      Cancer - colorectal No family hx of      Social History     Social History     Marital status:      Spouse name: Amy      Number of children: 0     Years of education: 15     Occupational History     printing company sales Retired     Social History Main Topics     Smoking status: Never Smoker     Smokeless tobacco: Never Used     Alcohol use 0.0 oz/week      Comment: rare     Drug use: No     Sexual activity: Yes     Partners: Female     Other Topics Concern      Service No     Blood Transfusions No     unsure     Caffeine Concern No     Occupational Exposure No     Hobby Hazards No     Sleep Concern No     Stress Concern No     Weight Concern No     Special Diet No     watches intake of salt and sugar     Back Care No     Exercise Yes     everyday goes for mile and a half to two mile walk     Bike Helmet No     Seat Belt Yes     Self-Exams Yes     Social History Narrative       REVIEW OF SYSTEMS  =================  C: NEGATIVE for fever, chills, change in weight  I: NEGATIVE for worrisome rashes, moles or lesions  E/M: NEGATIVE for ear, mouth and throat problems  R: NEGATIVE for significant cough or SHORTNESS OF BREATH,   CV: NEGATIVE for chest pain, palpitations or peripheral edema  GI: NEGATIVE for nausea, abdominal pain, heartburn, or change in bowel habits  NEURO: NEGATIVE any motor/sensory changes  PSYCH: NEGATIVE for recent mood disorder    Physical Exam:  /68 (BP Location: Right arm, Patient Position: Chair, Cuff Size: Adult Large)  Pulse 70  Resp 14   Patient is pleasant, in no acute distress, good general condition.  Lung: no evidence of respiratory distress    Abdomen: Soft, nondistended, non tender. No masses.  No rebound or guarding.   Exam: penis no d/c.  Testis no masses.  No scrotal skin lesion.  Prostate 60 gm smooth no nodule.  Skin: Warm and dry.  No redness.  Psych: normal mood and affect  Neuro: alert and oriented    Assessment/Plan:   (N40.1,  N13.8) Hypertrophy of prostate with urinary obstruction  (primary encounter diagnosis)  Comment: bladder scan 15 ml  Plan: cont with proscar    (R97.20) Elevated prostate specific antigen (PSA)  Comment:    Plan: PSA tumor marker

## 2017-05-01 NOTE — MR AVS SNAPSHOT
After Visit Summary   5/1/2017    Steve José    MRN: [id number]           Patient Information     Date Of Birth          1950        Visit Information        Provider Department      5/1/2017 9:15 AM Semaj Sotelo MD [facility]        Today's Diagnoses     Hypertrophy of prostate with urinary obstruction    -  1    Elevated prostate specific antigen (PSA)           Follow-ups after your visit        Who to contact     If you have questions or need follow up information about today's clinic visit or your schedule please contact [facility] directly at [phone].  Normal or non-critical lab and imaging results will be communicated to you by EverPresenthart, letter or phone within 4 business days after the clinic has received the results. If you do not hear from us within 7 days, please contact the clinic through EverPresenthart or phone. If you have a critical or abnormal lab result, we will notify you by phone as soon as possible.  Submit refill requests through [employer] or call your pharmacy and they will forward the refill request to us. Please allow 3 business days for your refill to be completed.          Additional Information About Your Visit        MyChart Information     [employer] gives you secure access to your electronic health record. If you see a primary care provider, you can also send messages to your care team and make appointments. If you have questions, please call your primary care clinic.  If you do not have a primary care provider, please call [phone] and they will assist you.        Care EveryWhere ID     This is your Care EveryWhere ID. This could be used by other organizations to access your [place] medical records  [id number]        Your Vitals Were     Pulse Respirations                70 14           Blood Pressure from Last 3 Encounters:   05/01/17 118/68   04/19/17 120/80   03/29/17 124/80    Weight from Last 3 Encounters:   04/19/17 81.2 kg  (179 lb)   03/29/17 80.7 kg (178 lb)   10/06/16 82.6 kg (182 lb)              We Performed the Following     MEASURE POST-VOID RESIDUAL URINE/BLADDER CAPACITY, US NON-IMAGING (78250)     PSA tumor marker          Where to get your medicines      These medications were sent to [facility] [address]  [place], [place] - [address]  [address] [place] [address]     Phone:  [phone]     finasteride 5 MG tablet          Primary Care Provider Office Phone # Fax #    Bita Deluca [phone]       [facility] [address] [address]        Thank you!     Thank you for choosing [facility]  for your care. Our goal is always to provide you with excellent care. Hearing back from our patients is one way we can continue to improve our services. Please take a few minutes to complete the written survey that you may receive in the mail after your visit with us. Thank you!             Your Updated Medication List - Protect others around you: Learn how to safely use, store and throw away your medicines at www.disposemymeds.org.          This list is accurate as of: 5/1/17  9:25 AM.  Always use your most recent med list.                   Brand Name Dispense Instructions for use    aspirin 81 MG tablet      Take 1 tablet by mouth daily.       atenolol 25 MG tablet    TENORMIN    90 tablet    Take 1 tablet (25 mg) by mouth daily       CALCIUM 600 + D PO      Take  by mouth daily.       clotrimazole-betamethasone cream    LOTRISONE    15 g    Apply topically 2 times daily       finasteride 5 MG tablet    PROSCAR    90 tablet    Take 1 tablet (5 mg) by mouth daily       hydrochlorothiazide 25 MG tablet    HYDRODIURIL    90 tablet    Take 1 tablet (25 mg) by mouth daily       MULTI FOR HIM PO      Take  by mouth daily.       potassium chloride SA 10 MEQ CR tablet    K-DUR/KLOR-CON M    90 tablet    Take 1 tablet (10 mEq) by mouth daily        ranitidine 150 MG tablet    ZANTAC    180 tablet    Take 1 tablet (150 mg) by mouth 2 times daily       simvastatin 10 MG tablet    ZOCOR    90 tablet    Take 1 tablet (10 mg) by mouth At Bedtime at bedtime.

## 2017-05-01 NOTE — NURSING NOTE
"Chief Complaint   Patient presents with     Benign Prostatic Hypertrophy       Initial /68 (BP Location: Right arm, Patient Position: Chair, Cuff Size: Adult Large)  Pulse 70  Resp 14 Estimated body mass index is 23.62 kg/(m^2) as calculated from the following:    Height as of 4/19/17: 1.854 m (6' 1\").    Weight as of 4/19/17: 81.2 kg (179 lb).  Medication Reconciliation: complete   Adrienne Ansari RN       "

## 2017-05-09 ENCOUNTER — OFFICE VISIT (OUTPATIENT)
Dept: UROLOGY | Facility: CLINIC | Age: 67
End: 2017-05-09
Payer: COMMERCIAL

## 2017-05-09 VITALS — DIASTOLIC BLOOD PRESSURE: 82 MMHG | HEART RATE: 70 BPM | RESPIRATION RATE: 16 BRPM | SYSTOLIC BLOOD PRESSURE: 142 MMHG

## 2017-05-09 DIAGNOSIS — R97.20 ELEVATED PROSTATE SPECIFIC ANTIGEN (PSA): Primary | ICD-10-CM

## 2017-05-09 DIAGNOSIS — I10 HYPERTENSION GOAL BP (BLOOD PRESSURE) < 140/90: ICD-10-CM

## 2017-05-09 PROCEDURE — 87077 CULTURE AEROBIC IDENTIFY: CPT | Performed by: UROLOGY

## 2017-05-09 PROCEDURE — 99213 OFFICE O/P EST LOW 20 MIN: CPT | Performed by: UROLOGY

## 2017-05-09 PROCEDURE — 87070 CULTURE OTHR SPECIMN AEROBIC: CPT | Performed by: UROLOGY

## 2017-05-09 PROCEDURE — 87186 SC STD MICRODIL/AGAR DIL: CPT | Performed by: UROLOGY

## 2017-05-09 NOTE — MR AVS SNAPSHOT
After Visit Summary   5/9/2017    Steve José    MRN: [id number]           Patient Information     Date Of Birth          1950        Visit Information        Provider Department      5/9/2017 9:00 AM Semaj Sotelo MD [facility]        Today's Diagnoses     Elevated prostate specific antigen (PSA)    -  1    Hypertension goal BP (blood pressure) < 140/90           Follow-ups after your visit        Your next 10 appointments already scheduled     May 30, 2017  8:30 AM CDT   US PROSTATE WITH BIOPSY with [id number]   [facility] ([facility])    [address] [place] [address]   [phone]           Please bring a list of your medicines (including vitamins, minerals and over-the-counter drugs). Also, tell your doctor about any allergies you may have. Wear comfortable clothes and leave your valuables at home.  Take a Fleet enema two hours before your exam. Buy this at the drug store. Follow the instructions on the box.  Please bring or send the results of your most recent PSA test, along with the written report from your last rectal or prostate exam.  Please call the Imaging Department at your exam site with any questions.            May 30, 2017 11:15 AM CDT   Return Visit with Semaj Sotelo MD   [facility] ([facility])    [address] [place] [address]   [phone]            Jun 06, 2017 11:00 AM CDT   Return Visit with Semaj Sotelo MD   [facility] ([facility])    [address] [place] [address]   [phone]              Future tests that were ordered for you today     Open Future Orders        Priority Expected Expires Ordered    US Guided Needle Placement Routine 5/9/2017 5/9/2018 5/9/2017            Who to contact     If you have questions or need follow up information about today's clinic visit or your schedule please contact [place]  [facility] directly at [phone].  Normal or non-critical lab and imaging results will be communicated to you by MyChart, letter or phone within 4 business days after the clinic has received the results. If you do not hear from us within 7 days, please contact the clinic through InnerRewardshart or phone. If you have a critical or abnormal lab result, we will notify you by phone as soon as possible.  Submit refill requests through [employer] or call your pharmacy and they will forward the refill request to us. Please allow 3 business days for your refill to be completed.          Additional Information About Your Visit        InnerRewardsharwunderloop Information     [employer] gives you secure access to your electronic health record. If you see a primary care provider, you can also send messages to your care team and make appointments. If you have questions, please call your primary care clinic.  If you do not have a primary care provider, please call [phone] and they will assist you.        Care EveryWhere ID     This is your Care EveryWhere ID. This could be used by other organizations to access your [place] medical records  [id number]        Your Vitals Were     Pulse Respirations                70 16           Blood Pressure from Last 3 Encounters:   05/09/17 142/82   05/01/17 118/68   04/19/17 120/80    Weight from Last 3 Encounters:   04/19/17 81.2 kg (179 lb)   03/29/17 80.7 kg (178 lb)   10/06/16 82.6 kg (182 lb)              We Performed the Following     Perirectal Culture Aerobic Bacterial        Primary Care Provider Office Phone # Fax #    Bita Deluca [phone]       [facility] [address] [address]        Thank you!     Thank you for choosing [facility]  for your care. Our goal is always to provide you with excellent care. Hearing back from our patients is one way we can continue to improve our services. Please take a few minutes to complete the written  survey that you may receive in the mail after your visit with us. Thank you!             Your Updated Medication List - Protect others around you: Learn how to safely use, store and throw away your medicines at www.disposemymeds.org.          This list is accurate as of: 5/9/17  9:16 AM.  Always use your most recent med list.                   Brand Name Dispense Instructions for use    aspirin 81 MG tablet      Take 1 tablet by mouth daily.       atenolol 25 MG tablet    TENORMIN    90 tablet    Take 1 tablet (25 mg) by mouth daily       CALCIUM 600 + D PO      Take  by mouth daily.       clotrimazole-betamethasone cream    LOTRISONE    15 g    Apply topically 2 times daily       finasteride 5 MG tablet    PROSCAR    90 tablet    Take 1 tablet (5 mg) by mouth daily       hydrochlorothiazide 25 MG tablet    HYDRODIURIL    90 tablet    Take 1 tablet (25 mg) by mouth daily       MULTI FOR HIM PO      Take  by mouth daily.       potassium chloride SA 10 MEQ CR tablet    K-DUR/KLOR-CON M    90 tablet    Take 1 tablet (10 mEq) by mouth daily       ranitidine 150 MG tablet    ZANTAC    180 tablet    Take 1 tablet (150 mg) by mouth 2 times daily       simvastatin 10 MG tablet    ZOCOR    90 tablet    Take 1 tablet (10 mg) by mouth At Bedtime at bedtime.

## 2017-05-09 NOTE — NURSING NOTE
"Chief Complaint   Patient presents with     RECHECK     PSA results       Initial /82 (BP Location: Right arm, Patient Position: Chair, Cuff Size: Adult Regular)  Pulse 70  Resp 16 Estimated body mass index is 23.62 kg/(m^2) as calculated from the following:    Height as of 4/19/17: 1.854 m (6' 1\").    Weight as of 4/19/17: 81.2 kg (179 lb).  Medication Reconciliation: complete     Angely Gleason CMA    "

## 2017-05-09 NOTE — PROGRESS NOTES
Chief Complaint   Patient presents with     RECHECK     PSA results       Steve José is a 67 year old male who presents today for follow up of   Chief Complaint   Patient presents with     RECHECK     PSA results    f/u to discuss elevated psa.  His recent psa is 8 which is 16 since he is on proscar.  Several years ago his psa was only 6's.    Current Outpatient Prescriptions   Medication Sig Dispense Refill     finasteride (PROSCAR) 5 MG tablet Take 1 tablet (5 mg) by mouth daily 90 tablet 3     clotrimazole-betamethasone (LOTRISONE) cream Apply topically 2 times daily 15 g 1     simvastatin (ZOCOR) 10 MG tablet Take 1 tablet (10 mg) by mouth At Bedtime at bedtime. 90 tablet 3     atenolol (TENORMIN) 25 MG tablet Take 1 tablet (25 mg) by mouth daily 90 tablet 3     hydrochlorothiazide (HYDRODIURIL) 25 MG tablet Take 1 tablet (25 mg) by mouth daily 90 tablet 3     potassium chloride SA (K-DUR/KLOR-CON M) 10 MEQ CR tablet Take 1 tablet (10 mEq) by mouth daily 90 tablet 3     ranitidine (ZANTAC) 150 MG tablet Take 1 tablet (150 mg) by mouth 2 times daily 180 tablet 3     aspirin 81 MG tablet Take 1 tablet by mouth daily.       Multiple Vitamins-Minerals (MULTI FOR HIM PO) Take  by mouth daily.       Calcium Carbonate-Vitamin D (CALCIUM 600 + D OR) Take  by mouth daily.       No Known Allergies   Past Medical History:   Diagnosis Date     BPH (benign prostatic hypertrophy)      Chronic low back pain 2001     Colon polyp 2000     Hyperlipidemia      Hypertension      Prostatitis     recurrent     Past Surgical History:   Procedure Laterality Date     APPENDECTOMY       C GENITAL SURG PROC,MALE UNLISTED  2001    left hydrocele     C GENITAL SURG PROC,MALE UNLISTED  1977    right ureteral reimplantation     LASIK  2005     TURP  1998    x 2     Family History   Problem Relation Age of Onset     C.A.D. Father 64     d. 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     Social History Main Topics     Smoking status: Never Smoker     Smokeless tobacco: Never Used     Alcohol use 0.0 oz/week      Comment: rare     Drug use: No     Sexual activity: Yes     Partners: Female     Other Topics Concern      Service No     Blood Transfusions No     unsure     Caffeine Concern No     Occupational Exposure No     Hobby Hazards No     Sleep Concern No     Stress Concern No     Weight Concern No     Special Diet No     watches intake of salt and sugar     Back Care No     Exercise Yes     everyday goes for mile and a half to two mile walk     Bike Helmet No     Seat Belt Yes     Self-Exams Yes     Social History Narrative       REVIEW OF SYSTEMS  =================  C: NEGATIVE for fever, chills, change in weight  I: NEGATIVE for worrisome rashes, moles or lesions  E/M: NEGATIVE for ear, mouth and throat problems  R: NEGATIVE for significant cough or SHORTNESS OF BREATH,   CV: NEGATIVE for chest pain, palpitations or peripheral edema  GI: NEGATIVE for nausea, abdominal pain, heartburn, or change in bowel habits  NEURO: NEGATIVE any motor/sensory changes  PSYCH: NEGATIVE for recent mood disorder    Physical Exam:  /82 (BP Location: Right arm, Patient Position: Chair, Cuff Size: Adult Regular)  Pulse 70  Resp 16   Patient is pleasant, in no acute distress, good general condition.  Lung: no evidence of respiratory distress    Abdomen: Soft, nondistended, non tender. No masses. No rebound or guarding.   Exam: no cva tenderness  Skin: Warm and dry.  No redness.  Psych: normal mood and affect  Neuro: alert and oriented    Assessment/Plan:   (R97.20) Elevated prostate specific antigen (PSA)  (primary encounter  diagnosis)  Comment:    Plan: discussed result           Recommend biopsy to r/o cancer           Risks of bleeding/infection discussed at length.    HTN: Patient to follow up with Primary Care provider regarding elevated blood pressure.

## 2017-05-12 DIAGNOSIS — R97.20 ELEVATED PROSTATE SPECIFIC ANTIGEN (PSA): Primary | ICD-10-CM

## 2017-05-12 LAB
BACTERIA SPEC CULT: ABNORMAL
Lab: ABNORMAL
MICRO REPORT STATUS: ABNORMAL
MICROORGANISM SPEC CULT: ABNORMAL
SPECIMEN SOURCE: ABNORMAL

## 2017-05-12 RX ORDER — CIPROFLOXACIN 500 MG/1
500 TABLET, FILM COATED ORAL 2 TIMES DAILY
Qty: 6 TABLET | Refills: 0 | Status: SHIPPED | OUTPATIENT
Start: 2017-05-12 | End: 2017-05-15

## 2017-05-12 RX ORDER — CEPHALEXIN 500 MG/1
500 CAPSULE ORAL 3 TIMES DAILY
Qty: 9 CAPSULE | Refills: 0 | Status: SHIPPED | OUTPATIENT
Start: 2017-05-12 | End: 2017-05-15

## 2017-05-26 ENCOUNTER — TELEPHONE (OUTPATIENT)
Dept: FAMILY MEDICINE | Facility: CLINIC | Age: 67
End: 2017-05-26

## 2017-05-26 NOTE — TELEPHONE ENCOUNTER
Patient is having biopsy Tuesday 05/30/2017 and was told no Asprin. Recently tweaked back and would like to know if cyclobenzaprine is ok to take as he is in pain.    Requesting a call back as soon as possible.    Please advise.    Thank you.    Tamiko HA

## 2017-05-26 NOTE — TELEPHONE ENCOUNTER
Cannot take asa or nsaids  Can take cyclobenzaprine or tylenol 1000 mg tid to qid prn  Mireille Romo MD

## 2017-05-26 NOTE — TELEPHONE ENCOUNTER
RN notified patient of the provider's message as it's written below.  Patient agrees and verbalized understanding.     Artemio WHEAT RN, BSN

## 2017-05-30 ENCOUNTER — RADIANT APPOINTMENT (OUTPATIENT)
Dept: ULTRASOUND IMAGING | Facility: CLINIC | Age: 67
End: 2017-05-30
Payer: COMMERCIAL

## 2017-05-30 ENCOUNTER — OFFICE VISIT (OUTPATIENT)
Dept: UROLOGY | Facility: CLINIC | Age: 67
End: 2017-05-30
Payer: COMMERCIAL

## 2017-05-30 DIAGNOSIS — R97.20 ELEVATED PROSTATE SPECIFIC ANTIGEN (PSA): ICD-10-CM

## 2017-05-30 DIAGNOSIS — R97.20 ELEVATED PROSTATE SPECIFIC ANTIGEN (PSA): Primary | ICD-10-CM

## 2017-05-30 PROCEDURE — 55700 HC BIOPSY PROSTATE NEEDLE/PUNCH: CPT | Performed by: UROLOGY

## 2017-05-30 PROCEDURE — 88305 TISSUE EXAM BY PATHOLOGIST: CPT | Performed by: UROLOGY

## 2017-05-30 PROCEDURE — 99207 ZZC DROP WITH A PROCEDURE: CPT | Mod: 25 | Performed by: UROLOGY

## 2017-05-30 PROCEDURE — 99000 SPECIMEN HANDLING OFFICE-LAB: CPT | Performed by: UROLOGY

## 2017-05-30 PROCEDURE — 76942 ECHO GUIDE FOR BIOPSY: CPT | Performed by: UROLOGY

## 2017-05-30 PROCEDURE — 88344 IMHCHEM/IMCYTCHM EA MLT ANTB: CPT | Performed by: UROLOGY

## 2017-05-30 NOTE — MR AVS SNAPSHOT
After Visit Summary   5/30/2017    Steve José    MRN: [id number]           Patient Information     Date Of Birth          1950        Visit Information        Provider Department      5/30/2017 11:15 AM Semaj Sotelo MD [facility] [place]        Today's Diagnoses     Elevated prostate specific antigen (PSA)    -  1       Follow-ups after your visit        Your next 10 appointments already scheduled     May 30, 2017 11:15 AM CDT   Return Visit with Semaj Sotelo MD   [facility] ([address] [address] [phone]            Jun 12, 2017  9:15 AM CDT   [facility] [facility] [facility] [id number] [phone]              Who to contact     If you have questions or need follow up information about today's clinic visit or your schedule please contact [facility] directly at [phone].  Normal or non-critical lab and imaging results will be communicated to you by e[employer]inic [employer], letter or phone within 4 business days after the clinic has received the results. If you do not hear from us within 7 days, please contact the clinic through [email or web address] or phone. If you have a critical or abnormal lab result, we will notify you by phone as soon as possible.  Submit refill requests through [employer] or call your pharmacy and they will forward the refill request to us. Please allow 3 business days for your refill to be completed.          Additional Information About Your Visit        eClinic Healthcare[facility] Information     [employer] gives you secure access to your electronic health record. If you see a primary care provider, you can also send messages to your care team and make appointments. If you have questions, please call your primary care clinic.  If you do not have a primary care provider, please call [phone] and they will assist  you.        Care EveryWhere ID     This is your Care EveryWhere ID. This could be used by other organizations to access your [place] medical records  [id number]         Blood Pressure from Last 3 Encounters:   05/09/17 142/82   05/01/17 118/68   04/19/17 120/80    Weight from Last 3 Encounters:   04/19/17 81.2 kg (179 lb)   03/29/17 80.7 kg (178 lb)   10/06/16 82.6 kg (182 lb)              We Performed the Following     BIOPSY PROSTATE NEEDLE/PUNCH     C HANDLING LAB/BLOOD COLLECTION     Surgical pathology exam        Primary Care Provider Office Phone # Fax #    Bita Deluca [phone]       [facility] [address] [facility] [address]        Thank you!     Thank you for choosing [facility]  for your care. Our goal is always to provide you with excellent care. Hearing back from our patients is one way we can continue to improve our services. Please take a few minutes to complete the written survey that you may receive in the mail after your visit with us. Thank you!             Your Updated Medication List - Protect others around you: Learn how to safely use, store and throw away your medicines at www.disposemymeds.org.          This list is accurate as of: 5/30/17  9:04 AM.  Always use your most recent med list.                   Brand Name Dispense Instructions for use    aspirin 81 MG tablet      Take 1 tablet by mouth daily.       atenolol 25 MG tablet    TENORMIN    90 tablet    Take 1 tablet (25 mg) by mouth daily       CALCIUM 600 + D PO      Take  by mouth daily.       clotrimazole-betamethasone cream    LOTRISONE    15 g    Apply topically 2 times daily       finasteride 5 MG tablet    PROSCAR    90 tablet    Take 1 tablet (5 mg) by mouth daily       hydrochlorothiazide 25 MG tablet    HYDRODIURIL    90 tablet    Take 1 tablet (25 mg) by mouth daily       MULTI FOR HIM PO      Take  by mouth daily.       potassium chloride SA 10 MEQ CR tablet     K-DUR/KLOR-CON M    90 tablet    Take 1 tablet (10 mEq) by mouth daily       ranitidine 150 MG tablet    ZANTAC    180 tablet    Take 1 tablet (150 mg) by mouth 2 times daily       simvastatin 10 MG tablet    ZOCOR    90 tablet    Take 1 tablet (10 mg) by mouth At Bedtime at bedtime.

## 2017-05-30 NOTE — PROGRESS NOTES
Patient is here for prostate biopsy.  He was placed in the dorsal lithotomy position.  Prostate ultrasound placed transrectally.  The neurovascular bundle was anesthetized using 1% lidocaine.  Prostate measurements obtained.  Prostate size is 70 cc.  12 biopsies obtained under guidance of prostate ultrasound using biopsy needle.  Patient tolerated procedure.  Return to clinic in one week for biopsy result.

## 2017-06-01 LAB — COPATH REPORT: NORMAL

## 2017-06-12 ENCOUNTER — OFFICE VISIT (OUTPATIENT)
Dept: UROLOGY | Facility: CLINIC | Age: 67
End: 2017-06-12
Payer: COMMERCIAL

## 2017-06-12 VITALS — DIASTOLIC BLOOD PRESSURE: 82 MMHG | RESPIRATION RATE: 18 BRPM | HEART RATE: 68 BPM | SYSTOLIC BLOOD PRESSURE: 128 MMHG

## 2017-06-12 DIAGNOSIS — R97.20 ELEVATED PROSTATE SPECIFIC ANTIGEN (PSA): Primary | ICD-10-CM

## 2017-06-12 PROCEDURE — 99213 OFFICE O/P EST LOW 20 MIN: CPT | Performed by: UROLOGY

## 2017-06-12 NOTE — NURSING NOTE
"Chief Complaint   Patient presents with     RECHECK     biopsy results       Initial /82 (BP Location: Right arm, Patient Position: Chair, Cuff Size: Adult Regular)  Pulse 68  Resp 18 Estimated body mass index is 23.62 kg/(m^2) as calculated from the following:    Height as of 4/19/17: 1.854 m (6' 1\").    Weight as of 4/19/17: 81.2 kg (179 lb).  Medication Reconciliation: complete   Mireille Mackey CMA      "

## 2017-06-12 NOTE — MR AVS SNAPSHOT
After Visit Summary   6/12/2017    Steve José    MRN: [id number]           Patient Information     Date Of Birth          1950        Visit Information        Provider Department      6/12/2017 9:15 AM Semaj Sotelo MD [facility]        Today's Diagnoses     Elevated prostate specific antigen (PSA)    -  1       Follow-ups after your visit        Future tests that were ordered for you today     Open Future Orders        Priority Expected Expires Ordered    PSA tumor marker Routine 6/12/2018 6/12/2018 6/12/2017            Who to contact     If you have questions or need follow up information about today's clinic visit or your schedule please contact [facility] directly at [phone].  Normal or non-critical lab and imaging results will be communicated to you by MyChart, letter or phone within 4 business days after the clinic has received the results. If you do not hear from us within 7 days, please contact the clinic through "[employer]hart or phone. If you have a critical or abnormal lab result, we will notify you by phone as soon as possible.  Submit refill requests through [employer] or call your pharmacy and they will forward the refill request to us. Please allow 3 business days for your refill to be completed.          Additional Information About Your Visit        MyChart Information     [employer] gives you secure access to your electronic health record. If you see a primary care provider, you can also send messages to your care team and make appointments. If you have questions, please call your primary care clinic.  If you do not have a primary care provider, please call [phone] and they will assist you.        Care EveryWhere ID     This is your Care EveryWhere ID. This could be used by other organizations to access your [place] medical records  [id number]        Your Vitals Were     Pulse Respirations                68 18           Blood Pressure from  Last 3 Encounters:   06/12/17 128/82   05/09/17 142/82   05/01/17 118/68    Weight from Last 3 Encounters:   04/19/17 81.2 kg (179 lb)   03/29/17 80.7 kg (178 lb)   10/06/16 82.6 kg (182 lb)               Primary Care Provider Office Phone # Fax #    Bita Deluca [phone]       [facility] [address] [facility] [address]        Thank you!     Thank you for choosing [facility]  for your care. Our goal is always to provide you with excellent care. Hearing back from our patients is one way we can continue to improve our services. Please take a few minutes to complete the written survey that you may receive in the mail after your visit with us. Thank you!             Your Updated Medication List - Protect others around you: Learn how to safely use, store and throw away your medicines at www.disposemymeds.org.          This list is accurate as of: 6/12/17  9:29 AM.  Always use your most recent med list.                   Brand Name Dispense Instructions for use    aspirin 81 MG tablet      Take 1 tablet by mouth daily.       atenolol 25 MG tablet    TENORMIN    90 tablet    Take 1 tablet (25 mg) by mouth daily       CALCIUM 600 + D PO      Take  by mouth daily.       clotrimazole-betamethasone cream    LOTRISONE    15 g    Apply topically 2 times daily       finasteride 5 MG tablet    PROSCAR    90 tablet    Take 1 tablet (5 mg) by mouth daily       hydrochlorothiazide 25 MG tablet    HYDRODIURIL    90 tablet    Take 1 tablet (25 mg) by mouth daily       MULTI FOR HIM PO      Take  by mouth daily.       potassium chloride SA 10 MEQ CR tablet    K-DUR/KLOR-CON M    90 tablet    Take 1 tablet (10 mEq) by mouth daily       ranitidine 150 MG tablet    ZANTAC    180 tablet    Take 1 tablet (150 mg) by mouth 2 times daily       simvastatin 10 MG tablet    ZOCOR    90 tablet    Take 1 tablet (10 mg) by mouth At Bedtime at bedtime.

## 2017-06-12 NOTE — PROGRESS NOTES
Chief Complaint   Patient presents with     RECHECK     biopsy results       Steve José is a 67 year old male who presents today for follow up of   Chief Complaint   Patient presents with     RECHECK     biopsy results    f/u after recent biopsy of prostate.  He is doing well without any complaints.    Current Outpatient Prescriptions   Medication Sig Dispense Refill     finasteride (PROSCAR) 5 MG tablet Take 1 tablet (5 mg) by mouth daily 90 tablet 3     clotrimazole-betamethasone (LOTRISONE) cream Apply topically 2 times daily 15 g 1     simvastatin (ZOCOR) 10 MG tablet Take 1 tablet (10 mg) by mouth At Bedtime at bedtime. 90 tablet 3     atenolol (TENORMIN) 25 MG tablet Take 1 tablet (25 mg) by mouth daily 90 tablet 3     hydrochlorothiazide (HYDRODIURIL) 25 MG tablet Take 1 tablet (25 mg) by mouth daily 90 tablet 3     potassium chloride SA (K-DUR/KLOR-CON M) 10 MEQ CR tablet Take 1 tablet (10 mEq) by mouth daily 90 tablet 3     ranitidine (ZANTAC) 150 MG tablet Take 1 tablet (150 mg) by mouth 2 times daily 180 tablet 3     aspirin 81 MG tablet Take 1 tablet by mouth daily.       Multiple Vitamins-Minerals (MULTI FOR HIM PO) Take  by mouth daily.       Calcium Carbonate-Vitamin D (CALCIUM 600 + D OR) Take  by mouth daily.       No Known Allergies   Past Medical History:   Diagnosis Date     BPH (benign prostatic hypertrophy)      Chronic low back pain 2001     Colon polyp 2000     Hyperlipidemia      Hypertension      Prostatitis     recurrent     Past Surgical History:   Procedure Laterality Date     APPENDECTOMY       C GENITAL SURG PROC,MALE UNLISTED  2001    left hydrocele     C GENITAL SURG PROC,MALE UNLISTED  1977    right ureteral reimplantation     LASIK  2005     TURP  1998    x 2     Family History   Problem Relation Age of Onset     C.A.D. Father 64     d. MI     Hypertension Father      Hypertension Mother      CANCER Mother      skin     DIABETES No family hx of      Cancer - colorectal No  family hx of      Social History     Social History     Marital status:      Spouse name: Amy      Number of children: 0     Years of education: 15     Occupational History     printing company sales Retired     Social History Main Topics     Smoking status: Never Smoker     Smokeless tobacco: Never Used     Alcohol use 0.0 oz/week      Comment: rare     Drug use: No     Sexual activity: Yes     Partners: Female     Other Topics Concern      Service No     Blood Transfusions No     unsure     Caffeine Concern No     Occupational Exposure No     Hobby Hazards No     Sleep Concern No     Stress Concern No     Weight Concern No     Special Diet No     watches intake of salt and sugar     Back Care No     Exercise Yes     everyday goes for mile and a half to two mile walk     Bike Helmet No     Seat Belt Yes     Self-Exams Yes     Social History Narrative       REVIEW OF SYSTEMS  =================  C: NEGATIVE for fever, chills, change in weight  I: NEGATIVE for worrisome rashes, moles or lesions  E/M: NEGATIVE for ear, mouth and throat problems  R: NEGATIVE for significant cough or SHORTNESS OF BREATH,   CV: NEGATIVE for chest pain, palpitations or peripheral edema  GI: NEGATIVE for nausea, abdominal pain, heartburn, or change in bowel habits  NEURO: NEGATIVE any motor/sensory changes  PSYCH: NEGATIVE for recent mood disorder    Physical Exam:  /82 (BP Location: Right arm, Patient Position: Chair, Cuff Size: Adult Regular)  Pulse 68  Resp 18   Patient is pleasant, in no acute distress, good general condition.  Lung: no evidence of respiratory distress    Abdomen: Soft, nondistended, non tender. No masses. No rebound or guarding.   Exam: no cva tenderness  Skin: Warm and dry.  No redness.  Psych: normal mood and affect  Neuro: alert and oriented    Assessment/Plan:   (R97.20) Elevated prostate specific antigen (PSA)  (primary encounter diagnosis)  Comment: no cancer found  Plan: PSA tumor  marker        In one year

## 2017-09-25 NOTE — PROGRESS NOTES
SUBJECTIVE:   Steve José is a 67 year old male who presents to clinic today for the following health issues:      Hyperlipidemia Follow-Up      Rate your low fat/cholesterol diet?: fair-good    Taking statin?  Yes, muscle aches, possibly from other cause    Other lipid medications/supplements?:  none    Hypertension Follow-up      Outpatient blood pressures are being checked at the [facility].  Results are 130's/80's.    Low Salt Diet: no added salt        Amount of exercise or physical activity: 6-7 days/week for an average of 2-2 1/2 miles    Problems taking medications regularly: No    Medication side effects: none  Diet: low salt and low fat/cholesterol          Problem list and histories reviewed & adjusted, as indicated.  Additional history: as documented    Patient Active Problem List   Diagnosis     GERD (gastroesophageal reflux disease)     Hyperlipidemia LDL goal <130     Hypertension goal BP (blood pressure) < 140/90     Advanced directives, counseling/discussion     Hypertrophy of prostate with urinary obstruction     Past Surgical History:   Procedure Laterality Date     APPENDECTOMY       C GENITAL SURG PROC,MALE UNLISTED  2001    left hydrocele     C GENITAL SURG PROC,MALE UNLISTED  1977    right ureteral reimplantation     HRW CORNEAL TRANSPLANT, CRNA       LASIK  2005     TURP  1998    x 2       Social History   Substance Use Topics     Smoking status: Never Smoker     Smokeless tobacco: Never Used     Alcohol use 0.0 oz/week      Comment: rare     Family History   Problem Relation Age of Onset     C.A.D. Father 64     d. MI     Hypertension Father      Hypertension Mother      CANCER Mother      skin     DIABETES No family hx of      Cancer - colorectal No family hx of          Current Outpatient Prescriptions   Medication Sig Dispense Refill     metaxalone (SKELAXIN) 800 MG tablet Take 1 tablet (800 mg) by mouth 3 times daily as needed for moderate pain 30 tablet 1     finasteride (PROSCAR) 5  MG tablet Take 1 tablet (5 mg) by mouth daily 90 tablet 3     simvastatin (ZOCOR) 10 MG tablet Take 1 tablet (10 mg) by mouth At Bedtime at bedtime. 90 tablet 3     atenolol (TENORMIN) 25 MG tablet Take 1 tablet (25 mg) by mouth daily 90 tablet 3     hydrochlorothiazide (HYDRODIURIL) 25 MG tablet Take 1 tablet (25 mg) by mouth daily 90 tablet 3     potassium chloride SA (K-DUR/KLOR-CON M) 10 MEQ CR tablet Take 1 tablet (10 mEq) by mouth daily 90 tablet 3     ranitidine (ZANTAC) 150 MG tablet Take 1 tablet (150 mg) by mouth 2 times daily 180 tablet 3     aspirin 81 MG tablet Take 1 tablet by mouth daily.       Multiple Vitamins-Minerals (MULTI FOR HIM PO) Take  by mouth daily.       Calcium Carbonate-Vitamin D (CALCIUM 600 + D OR) Take  by mouth daily.       No Known Allergies  Recent Labs   Lab Test  03/29/17   0833  10/06/16   0843   10/08/15   0927  04/17/15   0822  10/23/14   0924   10/15/13   0806   11/02/10   0938   12/08/09   0858   A1C   --    --    --    --   5.8   --    --    --    --    --    --    --    LDL   --   71   --   62   --   62   --   64   < >  87   < >  72   HDL   --   57   --   54   --   56   --   41   < >  42   < >  45   TRIG   --   55   --   46   --   56   --   92   < >  76   < >  62   ALT   --   29   --    --    --   25   --   31   < >  33   < >  37   CR  0.84  0.83   < >  0.83  0.80   --    < >  0.81   < >  0.82   < >  0.91   GFRESTIMATED  >90  Non  GFR Calc    >90  Non  GFR Calc     < >  >90  Non  GFR Calc    >90  Non  GFR Calc     --    < >  >90   < >  >90   < >  85   GFRESTBLACK  >90   GFR Calc    >90   GFR Calc     < >  >90   GFR Calc    >90   GFR Calc     --    < >  >90   < >  >90   < >  >90   POTASSIUM  4.3  4.2   < >  3.9  3.8   --    < >  3.9   < >  4.4   < >  4.1   TSH   --    --    --    --    --    --    --    --    --   1.15   --   1.31    < > = values  "in this interval not displayed.      BP Readings from Last 3 Encounters:   09/26/17 138/86   06/12/17 128/82   05/09/17 142/82    Wt Readings from Last 3 Encounters:   09/26/17 174 lb (78.9 kg)   04/19/17 179 lb (81.2 kg)   03/29/17 178 lb (80.7 kg)                  Labs reviewed in EPIC          Reviewed and updated as needed this visit by clinical staff  Tobacco  Allergies  Meds  Med Hx  Surg Hx  Fam Hx  Soc Hx      Reviewed and updated as needed this visit by Provider  Tobacco  Allergies  Meds         ROS:  C: NEGATIVE for fever, chills, change in weight  E/M: NEGATIVE for ear, mouth and throat problems  R: NEGATIVE for significant cough or SOB  CV: NEGATIVE for chest pain, palpitations or peripheral edema  GI: NEGATIVE for nausea, abdominal pain, heartburn, or change in bowel habits  PSYCHIATRIC: NEGATIVE for changes in mood or affect    OBJECTIVE:     /86  Pulse 62  Temp 98.1  F (36.7  C) (Oral)  Ht 6' 1\" (1.854 m)  Wt 174 lb (78.9 kg)  SpO2 100%  BMI 22.96 kg/m2  Body mass index is 22.96 kg/(m^2).  GENERAL: healthy, alert and no distress  NECK: no adenopathy, no asymmetry, masses, or scars and thyroid normal to palpation  RESP: lungs clear to auscultation - no rales, rhonchi or wheezes  CV: regular rate and rhythm, normal S1 S2, no S3 or S4, no murmur, click or rub, no peripheral edema and peripheral pulses strong  ABDOMEN: soft, nontender, no hepatosplenomegaly, no masses and bowel sounds normal  MS: no gross musculoskeletal defects noted, no edema    Diagnostic Test Results:  none     ASSESSMENT/PLAN:         BMI:   Estimated body mass index is 22.96 kg/(m^2) as calculated from the following:    Height as of this encounter: 6' 1\" (1.854 m).    Weight as of this encounter: 174 lb (78.9 kg).   Weight management plan: Low анна diet/Exercise        1. Hyperlipidemia LDL goal <130  At goal  - Lipid panel reflex to direct LDL    2. Hypertension goal BP (blood pressure) < 140/90  controlled  - " Basic metabolic panel    3. Gastroesophageal reflux disease without esophagitis  Stable     4. Hypertrophy of prostate with urinary obstruction  Stable     5. Upper back pain  Pt needs a mucle relaxant occasionally-he has No pain at Present  - metaxalone (SKELAXIN) 800 MG tablet; Take 1 tablet (800 mg) by mouth 3 times daily as needed for moderate pain  Dispense: 30 tablet; Refill: 1    6. Special screening for malignant neoplasms, colon  Referral done-colonoscopy due in January  - GASTROENTEROLOGY ADULT REF PROCEDURE ONLY      Regular exercise    Bita Deluca MD  [facility]

## 2017-09-26 ENCOUNTER — OFFICE VISIT (OUTPATIENT)
Dept: FAMILY MEDICINE | Facility: CLINIC | Age: 67
End: 2017-09-26
Payer: COMMERCIAL

## 2017-09-26 VITALS
HEART RATE: 62 BPM | BODY MASS INDEX: 23.06 KG/M2 | TEMPERATURE: 98.1 F | WEIGHT: 174 LBS | DIASTOLIC BLOOD PRESSURE: 86 MMHG | SYSTOLIC BLOOD PRESSURE: 138 MMHG | OXYGEN SATURATION: 100 % | HEIGHT: 73 IN

## 2017-09-26 DIAGNOSIS — I10 HYPERTENSION GOAL BP (BLOOD PRESSURE) < 140/90: ICD-10-CM

## 2017-09-26 DIAGNOSIS — M54.9 UPPER BACK PAIN: ICD-10-CM

## 2017-09-26 DIAGNOSIS — N13.8 HYPERTROPHY OF PROSTATE WITH URINARY OBSTRUCTION: ICD-10-CM

## 2017-09-26 DIAGNOSIS — E78.5 HYPERLIPIDEMIA LDL GOAL <130: Primary | ICD-10-CM

## 2017-09-26 DIAGNOSIS — N40.1 HYPERTROPHY OF PROSTATE WITH URINARY OBSTRUCTION: ICD-10-CM

## 2017-09-26 DIAGNOSIS — K21.9 GASTROESOPHAGEAL REFLUX DISEASE WITHOUT ESOPHAGITIS: ICD-10-CM

## 2017-09-26 DIAGNOSIS — Z12.11 SPECIAL SCREENING FOR MALIGNANT NEOPLASMS, COLON: ICD-10-CM

## 2017-09-26 LAB
ANION GAP SERPL CALCULATED.3IONS-SCNC: 9 MMOL/L (ref 3–14)
BUN SERPL-MCNC: 21 MG/DL (ref 7–30)
CALCIUM SERPL-MCNC: 9.4 MG/DL (ref 8.5–10.1)
CHLORIDE SERPL-SCNC: 100 MMOL/L (ref 94–109)
CHOLEST SERPL-MCNC: 130 MG/DL
CO2 SERPL-SCNC: 30 MMOL/L (ref 20–32)
CREAT SERPL-MCNC: 0.82 MG/DL (ref 0.66–1.25)
GFR SERPL CREATININE-BSD FRML MDRD: >90 ML/MIN/1.7M2
GLUCOSE SERPL-MCNC: 103 MG/DL (ref 70–99)
HDLC SERPL-MCNC: 66 MG/DL
LDLC SERPL CALC-MCNC: 52 MG/DL
NONHDLC SERPL-MCNC: 64 MG/DL
POTASSIUM SERPL-SCNC: 4.1 MMOL/L (ref 3.4–5.3)
SODIUM SERPL-SCNC: 139 MMOL/L (ref 133–144)
TRIGL SERPL-MCNC: 60 MG/DL

## 2017-09-26 PROCEDURE — 80061 LIPID PANEL: CPT | Performed by: FAMILY MEDICINE

## 2017-09-26 PROCEDURE — 80048 BASIC METABOLIC PNL TOTAL CA: CPT | Performed by: FAMILY MEDICINE

## 2017-09-26 PROCEDURE — 99214 OFFICE O/P EST MOD 30 MIN: CPT | Performed by: FAMILY MEDICINE

## 2017-09-26 PROCEDURE — 36415 COLL VENOUS BLD VENIPUNCTURE: CPT | Performed by: FAMILY MEDICINE

## 2017-09-26 RX ORDER — METAXALONE 800 MG/1
800 TABLET ORAL 3 TIMES DAILY PRN
Qty: 30 TABLET | Refills: 1 | Status: SHIPPED | OUTPATIENT
Start: 2017-09-26 | End: 2018-10-22

## 2017-09-26 NOTE — PATIENT INSTRUCTIONS
[facility]    If you have any questions regarding to your visit please contact your care team:       Team Red:   Clinic Hours Telephone Number   Dr. Dary Duarte, NP   7am-7pm  Monday - Thursday   7am-5pm  Fridays  [phone]  (Appointment scheduling available 24/7)    Questions about your visit?   Team Line  [phone]   [facility] [place] [facility] [place] - 11am-9pm Monday-Friday Saturday-Sunday- 9am-5pm   [place] - 5pm-9pm Monday-Friday Saturday-Sunday- 9am-5pm  [phone] - [place]   [phone] - [place]       What options do I have for visits at the clinic other than the traditional office visit?  To expand how we care for you, many of our providers are utilizing electronic visits (e-visits) and telephone visits, when medically appropriate, for interactions with their patients rather than a visit in the clinic.   We also offer nurse visits for many medical concerns. Just like any other service, we will bill your insurance company for this type of visit based on time spent on the phone with your provider. Not all insurance companies cover these visits. Please check with your medical insurance if this type of visit is covered. You will be responsible for any charges that are not paid by your insurance.      E-visits via [employer]:  generally incur a $35.00 fee.  Telephone visits:  Time spent on the phone: *charged based on time that is spent on the phone in increments of 10 minutes. Estimated cost:   5-10 mins $30.00   11-20 mins. $59.00   21-30 mins. $85.00     Use [employer] (secure email communication and access to your chart) to send your primary care provider a message or make an appointment. Ask someone on your Team how to sign up for [employer].  For a Price Quote for your services, please call our Consumer Price Line at [phone].      As always, Thank you for trusting us with your health care needs!  Discharged  by Lo Corral MA.

## 2017-09-26 NOTE — MR AVS SNAPSHOT
After Visit Summary   9/26/2017    Steve José    MRN: [id number]           Patient Information     Date Of Birth          1950        Visit Information        Provider Department      9/26/2017 10:30 AM Bita Deluca MD [facility]        Today's Diagnoses     Hyperlipidemia LDL goal <130    -  1    Hypertension goal BP (blood pressure) < 140/90        Gastroesophageal reflux disease without esophagitis        Special screening for malignant neoplasms, colon        Hypertrophy of prostate with urinary obstruction        At risk for falling        Upper back pain          Care Instructions    [facility]    If you have any questions regarding to your visit please contact your care team:       Team Red:   Clinic Hours Telephone Number   Dr. Dary Duarte, NP   7am-7pm  Monday - Thursday   7am-5pm  Fridays  [phone]  (Appointment scheduling available 24/7)    Questions about your visit?   Team Line  [phone]   [facility] [place] [facility] - 11am-9pm Monday-Friday Saturday-Sunday- 9am-5pm   [place] - 5pm-9pm Monday-Friday Saturday-Sunday- 9am-5pm  [phone] - [place]   [phone] - [place]       What options do I have for visits at the clinic other than the traditional office visit?  To expand how we care for you, many of our providers are utilizing electronic visits (e-visits) and telephone visits, when medically appropriate, for interactions with their patients rather than a visit in the clinic.   We also offer nurse visits for many medical concerns. Just like any other service, we will bill your insurance company for this type of visit based on time spent on the phone with your provider. Not all insurance companies cover these visits. Please check with your medical insurance if this type of visit is covered. You will be responsible for any charges that are not paid by your  insurance.      E-visits via [employer]:  generally incur a $35.00 fee.  Telephone visits:  Time spent on the phone: *charged based on time that is spent on the phone in increments of 10 minutes. Estimated cost:   5-10 mins $30.00   11-20 mins. $59.00   21-30 mins. $85.00     Use [employer] (secure email communication and access to your chart) to send your primary care provider a message or make an appointment. Ask someone on your Team how to sign up for [employer].  For a Price Quote for your services, please call our [employer] Line at [phone].      As always, Thank you for trusting us with your health care needs!  Discharged by Lo Corral MA.            Follow-ups after your visit        Additional Services     GASTROENTEROLOGY ADULT REF PROCEDURE ONLY       Last Lab Result: Creatinine (mg/dL)       Date                     Value                 03/29/2017               0.84             ----------  Body mass index is 22.96 kg/(m^2).     Needed:  No  Language:  English    Patient will be contacted to schedule procedure.     Please be aware that coverage of these services is subject to the terms and limitations of your health insurance plan.  Call member services at your health plan with any benefit or coverage questions.  Any procedures must be performed at a [facility] facility OR coordinated by your clinic's referral office.    Please bring the following with you to your appointment:    (1) Any X-Rays, CTs or MRIs which have been performed.  Contact the facility where they were done to arrange for  prior to your scheduled appointment.    (2) List of current medications   (3) This referral request   (4) Any documents/labs given to you for this referral                  Who to contact     If you have questions or need follow up information about today's clinic visit or your schedule please contact [facility] [place] directly at [phone].  Normal or non-critical lab and imaging results  "will be communicated to you by [employer], letter or phone within 4 business days after the clinic has received the results. If you do not hear from us within 7 days, please contact the clinic through [employer] or phone. If you have a critical or abnormal lab result, we will notify you by phone as soon as possible.  Submit refill requests through [employer] or call your pharmacy and they will forward the refill request to us. Please allow 3 business days for your refill to be completed.          Additional Information About Your Visit        [employer] Information     [employer] gives you secure access to your electronic health record. If you see a primary care provider, you can also send messages to your care team and make appointments. If you have questions, please call your primary care clinic.  If you do not have a primary care provider, please call [phone] and they will assist you.        Care EveryWhere ID     This is your Care EveryWhere ID. This could be used by other organizations to access your [place] medical records  [id number]        Your Vitals Were     Pulse Temperature Height Pulse Oximetry BMI (Body Mass Index)       62 98.1  F (36.7  C) (Oral) 6' 1\" (1.854 m) 100% 22.96 kg/m2        Blood Pressure from Last 3 Encounters:   09/26/17 144/90   06/12/17 128/82   05/09/17 142/82    Weight from Last 3 Encounters:   09/26/17 174 lb (78.9 kg)   04/19/17 179 lb (81.2 kg)   03/29/17 178 lb (80.7 kg)              We Performed the Following     Basic metabolic panel     GASTROENTEROLOGY ADULT REF PROCEDURE ONLY     Lipid panel reflex to direct LDL          Today's Medication Changes          These changes are accurate as of: 9/26/17 11:01 AM.  If you have any questions, ask your nurse or doctor.               Start taking these medicines.        Dose/Directions    metaxalone 800 MG tablet   Commonly known as:  SKELAXIN   Used for:  Upper back pain   Started by:  Bita Deluca MD        Dose:  800 mg   Take 1 tablet " (800 mg) by mouth 3 times daily as needed for moderate pain   Quantity:  30 tablet   Refills:  1            Where to get your medicines      These medications were sent to [facility] [address]  [place] - [address]  [address] [place] [address]     Phone:  [phone]     metaxalone 800 MG tablet                Primary Care Provider Office Phone # Fax #    Bita Dleuca [phone] [address]  [place] [address]        Equal Access to Services     [facility]: Hadii aad ku hadasho Soomaali, waaxda luqadaha, qaybta kaalmada adeegyada, waxay omarin haydawna pierce . So [facility] [phone].    ATENCIÓN: Si habla español, tiene a ho disposición servicios gratuitos de asistencia lingüística. [facility] [phone].    We comply with applicable federal civil rights laws and [place] laws. We do not discriminate on the basis of race, color, national origin, age, disability sex, sexual orientation or gender identity.            Thank you!     Thank you for choosing [facility]  for your care. Our goal is always to provide you with excellent care. Hearing back from our patients is one way we can continue to improve our services. Please take a few minutes to complete the written survey that you may receive in the mail after your visit with us. Thank you!             Your Updated Medication List - Protect others around you: Learn how to safely use, store and throw away your medicines at www.disposemymeds.org.          This list is accurate as of: 9/26/17 11:01 AM.  Always use your most recent med list.                   Brand Name Dispense Instructions for use Diagnosis    aspirin 81 MG tablet      Take 1 tablet by mouth daily.        atenolol 25 MG tablet    TENORMIN    90 tablet    Take 1 tablet (25 mg) by mouth daily    Hypertension goal BP (blood pressure) < 140/90       CALCIUM 600 + D PO      Take  by mouth daily.         finasteride 5 MG tablet    PROSCAR    90 tablet    Take 1 tablet (5 mg) by mouth daily    Hypertrophy of prostate with urinary obstruction       hydrochlorothiazide 25 MG tablet    HYDRODIURIL    90 tablet    Take 1 tablet (25 mg) by mouth daily    Hypertension goal BP (blood pressure) < 140/90       metaxalone 800 MG tablet    SKELAXIN    30 tablet    Take 1 tablet (800 mg) by mouth 3 times daily as needed for moderate pain    Upper back pain       MULTI FOR HIM PO      Take  by mouth daily.        potassium chloride SA 10 MEQ CR tablet    K-DUR/KLOR-CON M    90 tablet    Take 1 tablet (10 mEq) by mouth daily    Hypertension goal BP (blood pressure) < 140/90       ranitidine 150 MG tablet    ZANTAC    180 tablet    Take 1 tablet (150 mg) by mouth 2 times daily    Gastroesophageal reflux disease without esophagitis       simvastatin 10 MG tablet    ZOCOR    90 tablet    Take 1 tablet (10 mg) by mouth At Bedtime at bedtime.    Hyperlipidemia LDL goal <130

## 2017-09-26 NOTE — NURSING NOTE
"Chief Complaint   Patient presents with     Hypertension     Lipids       Initial /90 (BP Location: Left arm, Patient Position: Chair, Cuff Size: Adult Regular)  Pulse 62  Temp 98.1  F (36.7  C) (Oral)  Ht 6' 1\" (1.854 m)  Wt 174 lb (78.9 kg)  SpO2 100%  BMI 22.96 kg/m2 Estimated body mass index is 22.96 kg/(m^2) as calculated from the following:    Height as of this encounter: 6' 1\" (1.854 m).    Weight as of this encounter: 174 lb (78.9 kg).  Medication Reconciliation: complete    "

## 2017-12-13 ENCOUNTER — TRANSFERRED RECORDS (OUTPATIENT)
Dept: HEALTH INFORMATION MANAGEMENT | Facility: CLINIC | Age: 67
End: 2017-12-13

## 2018-01-18 ENCOUNTER — TELEPHONE (OUTPATIENT)
Dept: FAMILY MEDICINE | Facility: CLINIC | Age: 68
End: 2018-01-18

## 2018-01-18 NOTE — TELEPHONE ENCOUNTER
Spoke with pt. States he has had symptoms for 3 weeks. Up in the am is coughing until green phlegm comes from bronchial. Head feels like it is running. Has been taking tussin for the cough at night. Doesn't think he has a fever. Has not measured temp. Shoulders and back ache. No chest pain. Doesn't feel it is down in his lungs. Throat sore and irritated from coughing. No wheezing. No SOB or difficulty breathing. Cough is first thing in the am, then coughs up green phlegm. No hemoptysis. Recommended an appt due to cough x 3 weeks. Appt scheduled. Advised to drink warm fluids such as tea with honey in it. Try a humidifier at night or steamy shower and breathe the mist to see if this will help with coughing spasms. Appt scheduled for tomorrow.    Mireille Salazar RN  [facility]

## 2018-01-18 NOTE — TELEPHONE ENCOUNTER
Reason for Call:  Other call back    Detailed comments:  Patient calling. For a week he is having the chills, coughing. Green phlem. Please call and advise.     Phone Number Patient can be reached at: Home number on file [phone] (home)    Best Time: call after 12:00.     Can we leave a detailed message on this number? YES    Call taken on 1/18/2018 at 7:36 AM by Georgia Dacosta

## 2018-01-19 ENCOUNTER — OFFICE VISIT (OUTPATIENT)
Dept: FAMILY MEDICINE | Facility: CLINIC | Age: 68
End: 2018-01-19
Payer: COMMERCIAL

## 2018-01-19 VITALS
RESPIRATION RATE: 18 BRPM | TEMPERATURE: 97.6 F | OXYGEN SATURATION: 97 % | HEART RATE: 74 BPM | BODY MASS INDEX: 23.88 KG/M2 | WEIGHT: 181 LBS | SYSTOLIC BLOOD PRESSURE: 128 MMHG | DIASTOLIC BLOOD PRESSURE: 66 MMHG

## 2018-01-19 DIAGNOSIS — J06.9 UPPER RESPIRATORY TRACT INFECTION, UNSPECIFIED TYPE: Primary | ICD-10-CM

## 2018-01-19 PROCEDURE — 99213 OFFICE O/P EST LOW 20 MIN: CPT | Performed by: FAMILY MEDICINE

## 2018-01-19 RX ORDER — AZITHROMYCIN 250 MG/1
TABLET, FILM COATED ORAL
Qty: 6 TABLET | Refills: 0 | Status: SHIPPED | OUTPATIENT
Start: 2018-01-19 | End: 2018-03-20

## 2018-01-19 RX ORDER — BENZONATATE 200 MG/1
200 CAPSULE ORAL 3 TIMES DAILY PRN
Qty: 21 CAPSULE | Refills: 0 | Status: SHIPPED | OUTPATIENT
Start: 2018-01-19 | End: 2018-03-20

## 2018-01-19 ASSESSMENT — PAIN SCALES - GENERAL: PAINLEVEL: NO PAIN (0)

## 2018-01-19 NOTE — PATIENT INSTRUCTIONS
Upper respiratory infections are usually caused by viruses and, sometimes certain bacteria.  Antibiotics don't help the vast majority of people recover any quicker even when caused by a bacteria.  The body will fight this infection but it needs to be treated well in order to help heal itself.  Rest as needed.  It is ok to reduce food intake if appetite is poor but it is quite important to maintain/increase fluid intake.    For cough, dextromethorphan/guaifenesin combinations help loosen secretions and suppress cough safely without significant risk of sedation. Often 2 puffs four times daily of an albuterol inhaler will help with bronchitis.  This is a prescription medicine.    For nasal congestion and sinus pressure, pseudoephedrine (Sudafed) or phenylephrine is often helpful but it can cause elevations in blood pressure and insomnia.  Short courses of a nasal decongestant spray (Afrin or Neosinephrine) can be appropriate but their use should be restricted to 3 days due to the high risk of nasal addiction.    For pain and fevers, acetaminophen (Tylenol) is most appropriate.  Ibuprofen (Advil) or naproxen (Aleve) are useful too and last longer but they can cause elevation of blood pressure or stomach problems.    Antihistamines (Benadryl, Dimetapp, etc.) cause sedation, confusion, bowel and urinary abnormalities and are of little use for infectious causes of cough and nasal congestion.  Their use should be reserved for allergic symptoms.    [facility]    If you have any questions regarding to your visit please contact your care team:       Team Red:   Clinic Hours Telephone Number   Dr. Dary Duarte NP   7am-7pm  Monday - Thursday   7am-5pm  Fridays  [phone]  (Appointment scheduling available 24/7)    Questions about your visit?   Team Line  [phone]   [facility] - 11am-9pm  Monday-Friday Saturday-Sunday- 9am-5pm   [place] - 5pm-9pm Monday-Friday Saturday-Sunday- 9am-5pm  [phone]  [place]   [phone] - [place]       What options do I have for visits at the clinic other than the traditional office visit?  To expand how we care for you, many of our providers are utilizing electronic visits (e-visits) and telephone visits, when medically appropriate, for interactions with their patients rather than a visit in the clinic.   We also offer nurse visits for many medical concerns. Just like any other service, we will bill your insurance company for this type of visit based on time spent on the phone with your provider. Not all insurance companies cover these visits. Please check with your medical insurance if this type of visit is covered. You will be responsible for any charges that are not paid by your insurance.      E-visits via [employer]  generally incur a $35.00 fee.  Telephone visits:  Time spent on the phone: *charged based on time that is spent on the phone in increments of 10 minutes. Estimated cost:   5-10 mins $30.00   11-20 mins. $59.00   21-30 mins. $85.00     Use [employer] (secure email communication and access to your chart) to send your primary care provider a message or make an appointment. Ask someone on your Team how to sign up for [employer]  For a Price Quote for your services, please call our Consumer Price Line at [phone].      As always, Thank you for trusting us with your health care needs!  Dante Jarrell

## 2018-01-19 NOTE — MR AVS SNAPSHOT
After Visit Summary   1/19/2018    Steve José    MRN: [id number]           Patient Information     Date Of Birth          1950        Visit Information        Provider Department      1/19/2018 7:20 AM Bita Deluca MD [facility]        Today's Diagnoses     Upper respiratory tract infection, unspecified type    -  1      Care Instructions    Upper respiratory infections are usually caused by viruses and, sometimes certain bacteria.  Antibiotics don't help the vast majority of people recover any quicker even when caused by a bacteria.  The body will fight this infection but it needs to be treated well in order to help heal itself.  Rest as needed.  It is ok to reduce food intake if appetite is poor but it is quite important to maintain/increase fluid intake.    For cough, dextromethorphan/guaifenesin combinations help loosen secretions and suppress cough safely without significant risk of sedation. Often 2 puffs four times daily of an albuterol inhaler will help with bronchitis.  This is a prescription medicine.    For nasal congestion and sinus pressure, pseudoephedrine (Sudafed) or phenylephrine is often helpful but it can cause elevations in blood pressure and insomnia.  Short courses of a nasal decongestant spray (Afrin or Neosinephrine) can be appropriate but their use should be restricted to 3 days due to the high risk of nasal addiction.    For pain and fevers, acetaminophen (Tylenol) is most appropriate.  Ibuprofen (Advil) or naproxen (Aleve) are useful too and last longer but they can cause elevation of blood pressure or stomach problems.    Antihistamines (Benadryl, Dimetapp, etc.) cause sedation, confusion, bowel and urinary abnormalities and are of little use for infectious causes of cough and nasal congestion.  Their use should be reserved for allergic symptoms.    [facility]    If you have any questions regarding to your visit please contact your care  team:       Team Red:   Clinic Hours Telephone Number   Dr. Dary Duarte, NP   7am-7pm  Monday - Thursday   7am-5pm  Fridays  [phone]  (Appointment scheduling available 24/7)    Questions about your visit?   Team Line  [phone]   [place]gent [facility] [place] - 11am-9pm Monday-Friday Saturday-Sunday- 9am-5pm   [place] - 5pm-9pm Monday-Friday Saturday-Sunday- 9am-5pm  [phone] - [facility]  [phone] - [place]       What options do I have for visits at the clinic other than the traditional office visit?  To expand how we care for you, many of our providers are utilizing electronic visits (e-visits) and telephone visits, when medically appropriate, for interactions with their patients rather than a visit in the clinic.   We also offer nurse visits for many medical concerns. Just like any other service, we will bill your insurance company for this type of visit based on time spent on the phone with your provider. Not all insurance companies cover these visits. Please check with your medical insurance if this type of visit is covered. You will be responsible for any charges that are not paid by your insurance.      E-visits via [employer]:  generally incur a $35.00 fee.  Telephone visits:  Time spent on the phone: *charged based on time that is spent on the phone in increments of 10 minutes. Estimated cost:   5-10 mins $30.00   11-20 mins. $59.00   21-30 mins. $85.00     Use [employer] (secure email communication and access to your chart) to send your primary care provider a message or make an appointment. Ask someone on your Team how to sign up for [employer].  For a Price Quote for your services, please call our Consumer Price Line at [phone].      As always, Thank you for trusting us with your health care needs!  Dante Jarrell            Follow-ups after your visit        Who to contact     If you have questions or need  follow up information about today's clinic visit or your schedule please contact [facility] directly at [phone].  Normal or non-critical lab and imaging results will be communicated to you by [employer], letter or phone within 4 business days after the clinic has received the results. If you do not hear from us within 7 days, please contact the clinic through [employer] or phone. If you have a critical or abnormal lab result, we will notify you by phone as soon as possible.  Submit refill requests through [employer] or call your pharmacy and they will forward the refill request to us. Please allow 3 business days for your refill to be completed.          Additional Information About Your Visit        [employer] Information     [employer] gives you secure access to your electronic health record. If you see a primary care provider, you can also send messages to your care team and make appointments. If you have questions, please call your primary care clinic.  If you do not have a primary care provider, please call [phone] and they will assist you.        Care EveryWhere ID     This is your Care EveryWhere ID. This could be used by other organizations to access your [place] medical records  [id number]        Your Vitals Were     Pulse Temperature Respirations Pulse Oximetry BMI (Body Mass Index)       74 97.6  F (36.4  C) (Oral) 18 97% 23.88 kg/m2        Blood Pressure from Last 3 Encounters:   01/19/18 128/66   09/26/17 138/86   06/12/17 128/82    Weight from Last 3 Encounters:   01/19/18 181 lb (82.1 kg)   09/26/17 174 lb (78.9 kg)   04/19/17 179 lb (81.2 kg)              Today, you had the following     No orders found for display         Today's Medication Changes          These changes are accurate as of: 1/19/18  7:41 AM.  If you have any questions, ask your nurse or doctor.               Start taking these medicines.        Dose/Directions    azithromycin 250 MG tablet   Commonly known as:  ZITHROMAX    Used for:  Upper respiratory tract infection, unspecified type   Started by:  Bita Deluca MD        Two tablets first day, then one tablet daily for four days.   Quantity:  6 tablet   Refills:  0       benzonatate 200 MG capsule   Commonly known as:  TESSALON   Used for:  Upper respiratory tract infection, unspecified type   Started by:  Bita Deluca MD        Dose:  200 mg   Take 1 capsule (200 mg) by mouth 3 times daily as needed for cough   Quantity:  21 capsule   Refills:  0            Where to get your medicines      These medications were sent to [facility] [place] - [address]  [address], [place] [facility] [address]     Phone:  [phone]     benzonatate 200 MG capsule         Some of these will need a paper prescription and others can be bought over the counter.  Ask your nurse if you have questions.     Bring a paper prescription for each of these medications     azithromycin 250 MG tablet                Primary Care Provider Office Phone # Fax #    Bita Deluca [phone] [address] [address]        Equal Access to Services     [facility]: Hadii meenu jeong hadasho Soomaali, waaxda luqadaha, qaybta kaalmada adeegyapari, levi pierce . So [facility] [phone].    ATENCIÓN: Si habla español, tiene a ho disposición servicios gratuitos de asistencia lingüística. Llame al [phone].    We comply with applicable federal civil rights laws and [place] laws. We do not discriminate on the basis of race, color, national origin, age, disability, sex, sexual orientation, or gender identity.            Thank you!     Thank you for choosing [facility]  for your care. Our goal is always to provide you with excellent care. Hearing back from our patients is one way we can continue to improve our services. Please take a few minutes to complete the written survey that you may receive in the  mail after your visit with us. Thank you!             Your Updated Medication List - Protect others around you: Learn how to safely use, store and throw away your medicines at www.disposemymeds.org.          This list is accurate as of: 1/19/18  7:41 AM.  Always use your most recent med list.                   Brand Name Dispense Instructions for use Diagnosis    aspirin 81 MG tablet      Take 1 tablet by mouth daily.        atenolol 25 MG tablet    TENORMIN    90 tablet    Take 1 tablet (25 mg) by mouth daily    Hypertension goal BP (blood pressure) < 140/90       azithromycin 250 MG tablet    ZITHROMAX    6 tablet    Two tablets first day, then one tablet daily for four days.    Upper respiratory tract infection, unspecified type       benzonatate 200 MG capsule    TESSALON    21 capsule    Take 1 capsule (200 mg) by mouth 3 times daily as needed for cough    Upper respiratory tract infection, unspecified type       CALCIUM 600 + D PO      Take  by mouth daily.        finasteride 5 MG tablet    PROSCAR    90 tablet    Take 1 tablet (5 mg) by mouth daily    Hypertrophy of prostate with urinary obstruction       hydrochlorothiazide 25 MG tablet    HYDRODIURIL    90 tablet    Take 1 tablet (25 mg) by mouth daily    Hypertension goal BP (blood pressure) < 140/90       metaxalone 800 MG tablet    SKELAXIN    30 tablet    Take 1 tablet (800 mg) by mouth 3 times daily as needed for moderate pain    Upper back pain       MULTI FOR HIM PO      Take  by mouth daily.        potassium chloride SA 10 MEQ CR tablet    K-DUR/KLOR-CON M    90 tablet    Take 1 tablet (10 mEq) by mouth daily    Hypertension goal BP (blood pressure) < 140/90       ranitidine 150 MG tablet    ZANTAC    180 tablet    Take 1 tablet (150 mg) by mouth 2 times daily    Gastroesophageal reflux disease without esophagitis       simvastatin 10 MG tablet    ZOCOR    90 tablet    Take 1 tablet (10 mg) by mouth At Bedtime at bedtime.    Hyperlipidemia LDL goal  <130

## 2018-01-19 NOTE — PROGRESS NOTES
SUBJECTIVE:   Steve José is a 67 year old male who presents to clinic today for the following health issues:      ENT Symptoms             Symptoms: cc Present Absent Comment   Fever/Chills  x  Chills  ,   No fever   Fatigue  x     Muscle Aches  x     Eye Irritation   x    Sneezing   x    Nasal Benigno/Drg  x     Sinus Pressure/Pain       Loss of smell   x    Dental pain   x    Sore Throat   x    Swollen Glands   x    Ear Pain/Fullness   x    Cough  x  More in AM   Wheeze   x    Chest Pain   x    Shortness of breath   x    Rash       Other         Symptom duration:  About 3 weeks   Symptom severity:  Moderate   Treatments tried:  OTC cold    Contacts:                 Problem list and histories reviewed & adjusted, as indicated.  Additional history: as documented    Patient Active Problem List   Diagnosis     GERD (gastroesophageal reflux disease)     Hyperlipidemia LDL goal <130     Hypertension goal BP (blood pressure) < 140/90     Advanced directives, counseling/discussion     Hypertrophy of prostate with urinary obstruction     Past Surgical History:   Procedure Laterality Date     APPENDECTOMY       C GENITAL SURG PROC,MALE UNLISTED  2001    left hydrocele     C GENITAL SURG PROC,MALE UNLISTED  1977    right ureteral reimplantation     HRW CORNEAL TRANSPLANT, CRNA       LASIK  2005     TURP  1998    x 2       Social History   Substance Use Topics     Smoking status: Never Smoker     Smokeless tobacco: Never Used     Alcohol use 0.0 oz/week      Comment: rare     Family History   Problem Relation Age of Onset     C.A.D. Father 64     d. MI     Hypertension Father      Hypertension Mother      CANCER Mother      skin     DIABETES No family hx of      Cancer - colorectal No family hx of          Current Outpatient Prescriptions   Medication Sig Dispense Refill     azithromycin (ZITHROMAX) 250 MG tablet Two tablets first day, then one tablet daily for four days. 6 tablet 0     benzonatate (TESSALON) 200 MG  capsule Take 1 capsule (200 mg) by mouth 3 times daily as needed for cough 21 capsule 0     metaxalone (SKELAXIN) 800 MG tablet Take 1 tablet (800 mg) by mouth 3 times daily as needed for moderate pain 30 tablet 1     finasteride (PROSCAR) 5 MG tablet Take 1 tablet (5 mg) by mouth daily 90 tablet 3     simvastatin (ZOCOR) 10 MG tablet Take 1 tablet (10 mg) by mouth At Bedtime at bedtime. 90 tablet 3     atenolol (TENORMIN) 25 MG tablet Take 1 tablet (25 mg) by mouth daily 90 tablet 3     hydrochlorothiazide (HYDRODIURIL) 25 MG tablet Take 1 tablet (25 mg) by mouth daily 90 tablet 3     potassium chloride SA (K-DUR/KLOR-CON M) 10 MEQ CR tablet Take 1 tablet (10 mEq) by mouth daily 90 tablet 3     ranitidine (ZANTAC) 150 MG tablet Take 1 tablet (150 mg) by mouth 2 times daily 180 tablet 3     aspirin 81 MG tablet Take 1 tablet by mouth daily.       Multiple Vitamins-Minerals (MULTI FOR HIM PO) Take  by mouth daily.       Calcium Carbonate-Vitamin D (CALCIUM 600 + D OR) Take  by mouth daily.       No Known Allergies  Recent Labs   Lab Test  09/26/17   1110  03/29/17   0833  10/06/16   0843   10/08/15   0927  04/17/15   0822  10/23/14   0924   10/15/13   0806   11/02/10   0938   12/08/09   0858   A1C   --    --    --    --    --   5.8   --    --    --    --    --    --    --    LDL  52   --   71   --   62   --   62   --   64   < >  87   < >  72   HDL  66   --   57   --   54   --   56   --   41   < >  42   < >  45   TRIG  60   --   55   --   46   --   56   --   92   < >  76   < >  62   ALT   --    --   29   --    --    --   25   --   31   < >  33   < >  37   CR  0.82  0.84  0.83   < >  0.83  0.80   --    < >  0.81   < >  0.82   < >  0.91   GFRESTIMATED  >90  >90  Non African American GFR Calc    >90  Non  GFR Calc     < >  >90  Non  GFR Calc    >90  Non  GFR Calc     --    < >  >90   < >  >90   < >  85   GFRESTBLACK  >90  >90  African American GFR Calc    >90    American GFR Calc     < >  >90   GFR Calc    >90   GFR Calc     --    < >  >90   < >  >90   < >  >90   POTASSIUM  4.1  4.3  4.2   < >  3.9  3.8   --    < >  3.9   < >  4.4   < >  4.1   TSH   --    --    --    --    --    --    --    --    --    --   1.15   --   1.31    < > = values in this interval not displayed.      BP Readings from Last 3 Encounters:   01/19/18 128/66   09/26/17 138/86   06/12/17 128/82    Wt Readings from Last 3 Encounters:   01/19/18 181 lb (82.1 kg)   09/26/17 174 lb (78.9 kg)   04/19/17 179 lb (81.2 kg)                  Labs reviewed in EPIC          Reviewed and updated as needed this visit by clinical staff     Reviewed and updated as needed this visit by Provider         ROS:  C: NEGATIVE for fever, chills, change in weight  INTEGUMENTARY/SKIN: NEGATIVE for worrisome rashes, moles or lesions  ENT/MOUTH: as above  RESP:cough nonprodutive ,no sob  CV: NEGATIVE for chest pain, palpitations or peripheral edema  GI: NEGATIVE for nausea, abdominal pain, heartburn, or change in bowel habits  MUSCULOSKELETAL: NEGATIVE for significant arthralgias or myalgia    OBJECTIVE:     /66  Pulse 74  Temp 97.6  F (36.4  C) (Oral)  Resp 18  Wt 181 lb (82.1 kg)  SpO2 97%  BMI 23.88 kg/m2  Body mass index is 23.88 kg/(m^2).  GENERAL: healthy, alert and no distress  EYES: Eyes grossly normal to inspection, PERRL and conjunctivae and sclerae normal  HENT: ear canals and TM's normal, nose congestion  and mouth without ulcers or lesions  NECK: no adenopathy, no asymmetry, masses, or scars and thyroid normal to palpation  RESP: lungs clear to auscultation - no rales, rhonchi or wheezes  CV: regular rate and rhythm, normal S1 S2, no S3 or S4, no murmur, click or rub, no peripheral edema and peripheral pulses strong  ABDOMEN: soft, nontender, no hepatosplenomegaly, no masses and bowel sounds normal  MS: no gross musculoskeletal defects noted, no edema    Diagnostic Test  Results:  none     ASSESSMENT/PLAN:         1. Upper respiratory tract infection, unspecified type  Advised symptomatic Treatment  Rest /fluids  If not better 1 week can take zithroma  Follow up 10 days if not better for CXR/sooner if worse  - azithromycin (ZITHROMAX) 250 MG tablet; Two tablets first day, then one tablet daily for four days.  Dispense: 6 tablet; Refill: 0  - benzonatate (TESSALON) 200 MG capsule; Take 1 capsule (200 mg) by mouth 3 times daily as needed for cough  Dispense: 21 capsule; Refill: 0  Bita Deluca MD  [facility]

## 2018-03-20 ENCOUNTER — OFFICE VISIT (OUTPATIENT)
Dept: FAMILY MEDICINE | Facility: CLINIC | Age: 68
End: 2018-03-20
Payer: COMMERCIAL

## 2018-03-20 VITALS
SYSTOLIC BLOOD PRESSURE: 126 MMHG | OXYGEN SATURATION: 95 % | HEART RATE: 81 BPM | RESPIRATION RATE: 15 BRPM | DIASTOLIC BLOOD PRESSURE: 80 MMHG | WEIGHT: 180 LBS | TEMPERATURE: 97.1 F | BODY MASS INDEX: 23.86 KG/M2 | HEIGHT: 73 IN

## 2018-03-20 DIAGNOSIS — E78.5 HYPERLIPIDEMIA LDL GOAL <130: ICD-10-CM

## 2018-03-20 DIAGNOSIS — M25.551 HIP PAIN, RIGHT: ICD-10-CM

## 2018-03-20 DIAGNOSIS — M54.5 ACUTE RIGHT-SIDED LOW BACK PAIN, WITH SCIATICA PRESENCE UNSPECIFIED: Primary | ICD-10-CM

## 2018-03-20 DIAGNOSIS — Z23 NEED FOR PROPHYLACTIC VACCINATION WITH TETANUS-DIPHTHERIA (TD): ICD-10-CM

## 2018-03-20 DIAGNOSIS — I10 HYPERTENSION GOAL BP (BLOOD PRESSURE) < 140/90: ICD-10-CM

## 2018-03-20 LAB
ANION GAP SERPL CALCULATED.3IONS-SCNC: 9 MMOL/L (ref 3–14)
BUN SERPL-MCNC: 25 MG/DL (ref 7–30)
CALCIUM SERPL-MCNC: 9.1 MG/DL (ref 8.5–10.1)
CHLORIDE SERPL-SCNC: 100 MMOL/L (ref 94–109)
CO2 SERPL-SCNC: 29 MMOL/L (ref 20–32)
CREAT SERPL-MCNC: 0.8 MG/DL (ref 0.66–1.25)
GFR SERPL CREATININE-BSD FRML MDRD: >90 ML/MIN/1.7M2
GLUCOSE SERPL-MCNC: 127 MG/DL (ref 70–99)
POTASSIUM SERPL-SCNC: 4.2 MMOL/L (ref 3.4–5.3)
SODIUM SERPL-SCNC: 138 MMOL/L (ref 133–144)

## 2018-03-20 PROCEDURE — 90471 IMMUNIZATION ADMIN: CPT | Performed by: FAMILY MEDICINE

## 2018-03-20 PROCEDURE — 80048 BASIC METABOLIC PNL TOTAL CA: CPT | Performed by: FAMILY MEDICINE

## 2018-03-20 PROCEDURE — 99214 OFFICE O/P EST MOD 30 MIN: CPT | Mod: 25 | Performed by: FAMILY MEDICINE

## 2018-03-20 PROCEDURE — 90714 TD VACC NO PRESV 7 YRS+ IM: CPT | Performed by: FAMILY MEDICINE

## 2018-03-20 PROCEDURE — 36415 COLL VENOUS BLD VENIPUNCTURE: CPT | Performed by: FAMILY MEDICINE

## 2018-03-20 RX ORDER — NAPROXEN 500 MG/1
500 TABLET ORAL 2 TIMES DAILY PRN
Qty: 30 TABLET | Refills: 1 | Status: SHIPPED | OUTPATIENT
Start: 2018-03-20 | End: 2019-04-08

## 2018-03-20 RX ORDER — SIMVASTATIN 10 MG
10 TABLET ORAL AT BEDTIME
Qty: 90 TABLET | Refills: 3 | Status: SHIPPED | OUTPATIENT
Start: 2018-03-20 | End: 2019-04-08

## 2018-03-20 RX ORDER — ATENOLOL 25 MG/1
25 TABLET ORAL DAILY
Qty: 90 TABLET | Refills: 3 | Status: SHIPPED | OUTPATIENT
Start: 2018-03-20 | End: 2018-10-22

## 2018-03-20 RX ORDER — HYDROCHLOROTHIAZIDE 25 MG/1
25 TABLET ORAL DAILY
Qty: 90 TABLET | Refills: 3 | Status: SHIPPED | OUTPATIENT
Start: 2018-03-20 | End: 2019-04-08

## 2018-03-20 RX ORDER — POTASSIUM CHLORIDE 750 MG/1
10 TABLET, EXTENDED RELEASE ORAL DAILY
Qty: 90 TABLET | Refills: 3 | Status: SHIPPED | OUTPATIENT
Start: 2018-03-20 | End: 2019-04-08

## 2018-03-20 NOTE — PROGRESS NOTES
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  Additional history: as documented    Patient Active Problem List   Diagnosis     GERD (gastroesophageal reflux disease)     Hyperlipidemia LDL goal <130     Hypertension goal BP (blood pressure) < 140/90     Advanced directives, counseling/discussion     Hypertrophy of prostate with urinary obstruction     Past Surgical History:   Procedure Laterality Date     APPENDECTOMY       C GENITAL SURG PROC,MALE UNLISTED  2001    left hydrocele     C GENITAL SURG PROC,MALE UNLISTED  1977    right ureteral reimplantation     HRW CORNEAL TRANSPLANT, CRNA       LASIK  2005     TURP  1998    x 2       Social History   Substance Use Topics     Smoking status: Never Smoker     Smokeless tobacco: Never Used     Alcohol use 0.0 oz/week      Comment: rare     Family History   Problem Relation Age of Onset     C.A.D. Father 64     d. MI     Hypertension Father      Hypertension Mother      CANCER Mother      skin     DIABETES No family hx of      Cancer - colorectal No family hx of          Current Outpatient Prescriptions   Medication Sig Dispense Refill     tiZANidine (ZANAFLEX) 4 MG tablet Take 1 tablet (4 mg) by mouth 3 times daily as needed for muscle spasms 30 tablet 1      naproxen (NAPROSYN) 500 MG tablet Take 1 tablet (500 mg) by mouth 2 times daily as needed for moderate pain 30 tablet 1     simvastatin (ZOCOR) 10 MG tablet Take 1 tablet (10 mg) by mouth At Bedtime at bedtime. 90 tablet 3     atenolol (TENORMIN) 25 MG tablet Take 1 tablet (25 mg) by mouth daily 90 tablet 3     hydrochlorothiazide (HYDRODIURIL) 25 MG tablet Take 1 tablet (25 mg) by mouth daily 90 tablet 3     potassium chloride SA (K-DUR/KLOR-CON M) 10 MEQ CR tablet Take 1 tablet (10 mEq) by mouth daily 90 tablet 3     metaxalone (SKELAXIN) 800 MG tablet Take 1 tablet (800 mg) by mouth 3 times daily as needed for moderate pain 30 tablet 1     finasteride (PROSCAR) 5 MG tablet Take 1 tablet (5 mg) by mouth daily 90 tablet 3     ranitidine (ZANTAC) 150 MG tablet Take 1 tablet (150 mg) by mouth 2 times daily 180 tablet 3     aspirin 81 MG tablet Take 1 tablet by mouth daily.       Multiple Vitamins-Minerals (MULTI FOR HIM PO) Take  by mouth daily.       Calcium Carbonate-Vitamin D (CALCIUM 600 + D OR) Take  by mouth daily.       [DISCONTINUED] simvastatin (ZOCOR) 10 MG tablet Take 1 tablet (10 mg) by mouth At Bedtime at bedtime. 90 tablet 3     [DISCONTINUED] atenolol (TENORMIN) 25 MG tablet Take 1 tablet (25 mg) by mouth daily 90 tablet 3     [DISCONTINUED] hydrochlorothiazide (HYDRODIURIL) 25 MG tablet Take 1 tablet (25 mg) by mouth daily 90 tablet 3     [DISCONTINUED] potassium chloride SA (K-DUR/KLOR-CON M) 10 MEQ CR tablet Take 1 tablet (10 mEq) by mouth daily 90 tablet 3     No Known Allergies  Recent Labs   Lab Test  09/26/17   1110  03/29/17   0833  10/06/16   0843   10/08/15   0927  04/17/15   0822  10/23/14   0924   10/15/13   0806   11/02/10   0938   A1C   --    --    --    --    --   5.8   --    --    --    --    --    LDL  52   --   71   --   62   --   62   --   64   < >  87   HDL  66   --   57   --   54   --   56   --   41   < >  42   TRIG  60   --   55   --   46   --   56   --   92   < >  76   ALT    "--    --   29   --    --    --   25   --   31   < >  33   CR  0.82  0.84  0.83   < >  0.83  0.80   --    < >  0.81   < >  0.82   GFRESTIMATED  >90  >90  Non African American GFR Calc    >90  Non  GFR Calc     < >  >90  Non  GFR Calc    >90  Non  GFR Calc     --    < >  >90   < >  >90   GFRESTBLACK  >90  >90  African American GFR Calc    >90   GFR Calc     < >  >90   GFR Calc    >90   GFR Calc     --    < >  >90   < >  >90   POTASSIUM  4.1  4.3  4.2   < >  3.9  3.8   --    < >  3.9   < >  4.4   TSH   --    --    --    --    --    --    --    --    --    --   1.15    < > = values in this interval not displayed.      BP Readings from Last 3 Encounters:   03/20/18 126/80   01/19/18 128/66   09/26/17 138/86    Wt Readings from Last 3 Encounters:   03/20/18 180 lb (81.6 kg)   01/19/18 181 lb (82.1 kg)   09/26/17 174 lb (78.9 kg)                  Labs reviewed in EPIC    Reviewed and updated as needed this visit by clinical staff       Reviewed and updated as needed this visit by Provider         ROS:  CONSTITUTIONAL: NEGATIVE for fever, chills, change in weight  CV: NEGATIVE for chest pain, palpitations or peripheral edema  GI: NEGATIVE for nausea, abdominal pain, heartburn, or change in bowel habits  MUSCULOSKELETAL: as above  NEURO: NEGATIVE for weakness, dizziness or paresthesias    OBJECTIVE:     /80  Pulse 81  Temp 97.1  F (36.2  C)  Resp 15  Ht 6' 1\" (1.854 m)  Wt 180 lb (81.6 kg)  SpO2 95%  BMI 23.75 kg/m2  Body mass index is 23.75 kg/(m^2).  GENERAL: healthy, alert and no distress  NECK: no adenopathy, no asymmetry, masses, or scars and thyroid normal to palpation  CV: regular rate and rhythm, normal S1 S2, no S3 or S4, no murmur, click or rub, no peripheral edema and peripheral pulses strong  ABDOMEN: soft, nontender, no hepatosplenomegaly, no masses and bowel sounds normal  MS: no gross musculoskeletal " defects noted, no edema  SKIN: no suspicious lesions or rashes  NEURO: Normal strength and tone, mentation intact and speech normal  Comprehensive back pain exam:  Tenderness of Right Lower back, Range of motion not limited by pain, Lower extremity strength functional and equal on both sides, Lower extremity reflexes within normal limits bilaterally, Lower extremity sensation normal and equal on both sides and Straight leg raise negative bilaterally    Diagnostic Test Results:  none     ASSESSMENT/PLAN:         1. Acute right-sided low back pain, with sciatica presence unspecified  SEE [facility] orders  The potential side effects of this medication have been discussed with the patient.  Call if any significant problems with these are experienced.  Follow up 1 months if not better/sooner if worse    - tiZANidine (ZANAFLEX) 4 MG tablet; Take 1 tablet (4 mg) by mouth 3 times daily as needed for muscle spasms  Dispense: 30 tablet; Refill: 1  - naproxen (NAPROSYN) 500 MG tablet; Take 1 tablet (500 mg) by mouth 2 times daily as needed for moderate pain  Dispense: 30 tablet; Refill: 1  - [facility] PT, HAND, AND CHIROPRACTIC REFERRAL    2. Hip pain, right  As above  - naproxen (NAPROSYN) 500 MG tablet; Take 1 tablet (500 mg) by mouth 2 times daily as needed for moderate pain  Dispense: 30 tablet; Refill: 1  - [facility] PT, HAND, AND CHIROPRACTIC REFERRAL    3. Hyperlipidemia LDL goal <130  Refilled-doing well on meds  - simvastatin (ZOCOR) 10 MG tablet; Take 1 tablet (10 mg) by mouth At Bedtime at bedtime.  Dispense: 90 tablet; Refill: 3    4. Hypertension goal BP (blood pressure) < 140/90  Stable   - atenolol (TENORMIN) 25 MG tablet; Take 1 tablet (25 mg) by mouth daily  Dispense: 90 tablet; Refill: 3  - hydrochlorothiazide (HYDRODIURIL) 25 MG tablet; Take 1 tablet (25 mg) by mouth daily  Dispense: 90 tablet; Refill: 3  - potassium chloride SA (K-DUR/KLOR-CON M) 10 MEQ CR tablet; Take 1 tablet (10 mEq) by mouth daily  Dispense: 90  tablet; Refill: 3  - Basic metabolic panel    5. Need for prophylactic vaccination with tetanus-diphtheria (TD)  Advised   - TD (ADULT, 7+) PRESERVE FREE  Follow up as above  Bita Deluca MD  [facility]

## 2018-03-20 NOTE — NURSING NOTE
"Chief Complaint   Patient presents with     Musculoskeletal Problem     right hip upper leg and back       Initial /80  Pulse 81  Temp 97.1  F (36.2  C)  Resp 15  Ht 6' 1\" (1.854 m)  Wt 180 lb (81.6 kg)  SpO2 95%  BMI 23.75 kg/m2 Estimated body mass index is 23.75 kg/(m^2) as calculated from the following:    Height as of this encounter: 6' 1\" (1.854 m).    Weight as of this encounter: 180 lb (81.6 kg).  Medication Reconciliation: complete     Marily Minaya. MA      "

## 2018-03-20 NOTE — MR AVS SNAPSHOT
After Visit Summary   3/20/2018    Steve José    MRN: [id number]           Patient Information     Date Of Birth          1950        Visit Information        Provider Department      3/20/2018 11:50 AM Bita Deluca MD [facility]        Today's Diagnoses     Acute right-sided low back pain, with sciatica presence unspecified    -  1    Hip pain, right        Hyperlipidemia LDL goal <130        Hypertension goal BP (blood pressure) < 140/90        Need for prophylactic vaccination with tetanus-diphtheria (TD)          Care Instructions    [facility]    If you have any questions regarding to your visit please contact your care team:       Team Red:   Clinic Hours Telephone Number   Dr. Dary Jones  (pediatrics)  Lyly Duarte NP 7am-7pm  Monday - Thursday   7am-5pm  Fridays  [phone] (fax)    Sis CRAIG  [phone]   Urgent Care - [place] and [place] Monday-Friday  [place] - 11am-8pm  Saturday-Sunday  Both sites - 9am-5pm  [phone] - [facility]  [phone] - [place]       What options do I have for visits at the clinic other than the traditional office visit?  To expand how we care for you, many of our providers are utilizing electronic visits (e-visits) and telephone visits, when medically appropriate, for interactions with their patients rather than a visit in the clinic.   We also offer nurse visits for many medical concerns. Just like any other service, we will bill your insurance company for this type of visit based on time spent on the phone with your provider. Not all insurance companies cover these visits. Please check with your medical insurance if this type of visit is covered. You will be responsible for any charges that are not paid by your insurance.      E-visits via [employer]:  generally incur a $35.00 fee.  Telephone visits:  Time spent on the phone: *charged based on time  that is spent on the phone in increments of 10 minutes. Estimated cost:   5-10 mins $30.00   11-20 mins. $59.00   21-30 mins. $85.00     As always, Thank you for trusting us with your health care needs!            Discharge TRUDY Minaya  [facility]            Follow-ups after your visit        Additional Services     [facility] PT, HAND, AND CHIROPRACTIC REFERRAL       **This order will print in the [facility] Scheduling Office**    Physical Therapy, Hand Therapy and Chiropractic Care are available through:    *[facility]  *[facility]  *[facility]    Call one number to schedule at any of the above locations: [phone].    Your provider has referred you to: As Indicated:     Indication/Reason for Referral:   Onset of Illness:   Therapy Orders: Evaluate and Treat  Special Programs: None  Special Request: None    Harry Mcdonald      Additional Comments for the Therapist or Chiropractor:     Please be aware that coverage of these services is subject to the terms and limitations of your health insurance plan.  Call member services at your health plan with any benefit or coverage questions.      Please bring the following to your appointment:    *Your personal calendar for scheduling future appointments  *Comfortable clothing                  Who to contact     If you have questions or need follow up information about today's clinic visit or your schedule please contact [facility] directly at [phone].  Normal or non-critical lab and imaging results will be communicated to you by MyChart, letter or phone within 4 business days after the clinic has received the results. If you do not hear from us within 7 days, please contact the clinic through MyChart or phone. If you have a critical or abnormal lab result, we will notify you by phone as soon as possible.  Submit refill requests through [email or web address] or call your pharmacy and they will forward the refill request to  "us. Please allow 3 business days for your refill to be completed.          Additional Information About Your Visit        [employer] Information     [employer] gives you secure access to your electronic health record. If you see a primary care provider, you can also send messages to your care team and make appointments. If you have questions, please call your primary care clinic.  If you do not have a primary care provider, please call [phone] and they will assist you.        Care EveryWhere ID     This is your Care EveryWhere ID. This could be used by other organizations to access your [facility] medical records  [id number]        Your Vitals Were     Pulse Temperature Respirations Height Pulse Oximetry BMI (Body Mass Index)    81 97.1  F (36.2  C) 15 6' 1\" (1.854 m) 95% 23.75 kg/m2       Blood Pressure from Last 3 Encounters:   03/20/18 126/80   01/19/18 128/66   09/26/17 138/86    Weight from Last 3 Encounters:   03/20/18 180 lb (81.6 kg)   01/19/18 181 lb (82.1 kg)   09/26/17 174 lb (78.9 kg)              We Performed the Following     Basic metabolic panel     [facility] PT, HAND, AND CHIROPRACTIC REFERRAL     TD (ADULT, 7+) PRESERVE FREE          Today's Medication Changes          These changes are accurate as of 3/20/18 12:19 PM.  If you have any questions, ask your nurse or doctor.               Start taking these medicines.        Dose/Directions    naproxen 500 MG tablet   Commonly known as:  NAPROSYN   Used for:  Acute right-sided low back pain, with sciatica presence unspecified, Hip pain, right   Started by:  Bita Deluca MD        Dose:  500 mg   Take 1 tablet (500 mg) by mouth 2 times daily as needed for moderate pain   Quantity:  30 tablet   Refills:  1       tiZANidine 4 MG tablet   Commonly known as:  ZANAFLEX   Used for:  Acute right-sided low back pain, with sciatica presence unspecified   Started by:  Bita Deluca MD        Dose:  4 mg   Take 1 tablet (4 mg) by mouth 3 times daily as needed for " muscle spasms   Quantity:  30 tablet   Refills:  1            Where to get your medicines      These medications were sent to [facility] - [place], [place] - [address]  [address], [place] [address]     Phone:  [phone]     atenolol 25 MG tablet    hydrochlorothiazide 25 MG tablet    naproxen 500 MG tablet    potassium chloride SA 10 MEQ CR tablet    simvastatin 10 MG tablet    tiZANidine 4 MG tablet                Primary Care Provider Office Phone # Fax #    Bita Deluca [phone] [address]  [place] [address]        Equal Access to Services     [facility]: Hadii aad ku hadasho Sobetsyali, waaxda luqadaha, qaybta kaalmada adeegyada, levi nelsonin haydawna pierce . So [facility] [phone].    ATENCIÓN: Si habla español, tiene a ho disposición servicios gratuitos de asistencia lingüística. [facility] [phone].    We comply with applicable federal civil rights laws and [place] laws. We do not discriminate on the basis of race, color, national origin, age, disability, sex, sexual orientation, or gender identity.            Thank you!     Thank you for choosing [facility]  for your care. Our goal is always to provide you with excellent care. Hearing back from our patients is one way we can continue to improve our services. Please take a few minutes to complete the written survey that you may receive in the mail after your visit with us. Thank you!             Your Updated Medication List - Protect others around you: Learn how to safely use, store and throw away your medicines at www.disposemymeds.org.          This list is accurate as of 3/20/18 12:19 PM.  Always use your most recent med list.                   Brand Name Dispense Instructions for use Diagnosis    aspirin 81 MG tablet      Take 1 tablet by mouth daily.        atenolol 25 MG tablet    TENORMIN    90 tablet    Take 1 tablet (25 mg) by mouth  daily    Hypertension goal BP (blood pressure) < 140/90       CALCIUM 600 + D PO      Take  by mouth daily.        finasteride 5 MG tablet    PROSCAR    90 tablet    Take 1 tablet (5 mg) by mouth daily    Hypertrophy of prostate with urinary obstruction       hydrochlorothiazide 25 MG tablet    HYDRODIURIL    90 tablet    Take 1 tablet (25 mg) by mouth daily    Hypertension goal BP (blood pressure) < 140/90       metaxalone 800 MG tablet    SKELAXIN    30 tablet    Take 1 tablet (800 mg) by mouth 3 times daily as needed for moderate pain    Upper back pain       MULTI FOR HIM PO      Take  by mouth daily.        naproxen 500 MG tablet    NAPROSYN    30 tablet    Take 1 tablet (500 mg) by mouth 2 times daily as needed for moderate pain    Acute right-sided low back pain, with sciatica presence unspecified, Hip pain, right       potassium chloride SA 10 MEQ CR tablet    K-DUR/KLOR-CON M    90 tablet    Take 1 tablet (10 mEq) by mouth daily    Hypertension goal BP (blood pressure) < 140/90       ranitidine 150 MG tablet    ZANTAC    180 tablet    Take 1 tablet (150 mg) by mouth 2 times daily    Gastroesophageal reflux disease without esophagitis       simvastatin 10 MG tablet    ZOCOR    90 tablet    Take 1 tablet (10 mg) by mouth At Bedtime at bedtime.    Hyperlipidemia LDL goal <130       tiZANidine 4 MG tablet    ZANAFLEX    30 tablet    Take 1 tablet (4 mg) by mouth 3 times daily as needed for muscle spasms    Acute right-sided low back pain, with sciatica presence unspecified

## 2018-03-20 NOTE — PATIENT INSTRUCTIONS
[facility]    If you have any questions regarding to your visit please contact your care team:       Team Red:   Clinic Hours Telephone Number   Dr. Dary Jones  (pediatrics)  Lyly Duarte NP 7am-7pm  Monday - Thursday   7am-5pm  Fridays  [phone] (fax)    Sis CRAIG  [phone]   Urgent Care - [place] and [place] Monday-Friday  [place] - 11am-8pm  Saturday-Sunday  Both sites - 9am-5pm  [phone] - [facility]  [phone] - [place]       What options do I have for visits at the clinic other than the traditional office visit?  To expand how we care for you, many of our providers are utilizing electronic visits (e-visits) and telephone visits, when medically appropriate, for interactions with their patients rather than a visit in the clinic.   We also offer nurse visits for many medical concerns. Just like any other service, we will bill your insurance company for this type of visit based on time spent on the phone with your provider. Not all insurance companies cover these visits. Please check with your medical insurance if this type of visit is covered. You will be responsible for any charges that are not paid by your insurance.      E-visits via [employer]  generally incur a $35.00 fee.  Telephone visits:  Time spent on the phone: *charged based on time that is spent on the phone in increments of 10 minutes. Estimated cost:   5-10 mins $30.00   11-20 mins. $59.00   21-30 mins. $85.00     As always, Thank you for trusting us with your health care needs!            Discharge TRUYD Minaya CMA

## 2018-03-20 NOTE — NURSING NOTE
Screening Questionnaire for Adult Immunization    Are you sick today?   No   Do you have allergies to medications, food, a vaccine component or latex?   No   Have you ever had a serious reaction after receiving a vaccination?   No   Do you have a long-term health problem with heart disease, lung disease, asthma, kidney disease, metabolic disease (e.g. diabetes), anemia, or other blood disorder?   No   Do you have cancer, leukemia, HIV/AIDS, or any other immune system problem?   No   In the past 3 months, have you taken medications that affect  your immune system, such as prednisone, other steroids, or anticancer drugs; drugs for the treatment of rheumatoid arthritis, Crohn s disease, or psoriasis; or have you had radiation treatments?   No   Have you had a seizure, or a brain or other nervous system problem?   No   During the past year, have you received a transfusion of blood or blood     products, or been given immune (gamma) globulin or antiviral drug?   No   For women: Are you pregnant or is there a chance you could become        pregnant during the next month?   No   Have you received any vaccinations in the past 4 weeks?   No     Immunization questionnaire answers were all negative.        Per orders of Dr. Bita Deluca, injection of Td given by Marily Minaya. Patient instructed to remain in clinic for 15 minutes afterwards, and to report any adverse reaction to me immediately.       Screening performed by Marily Minaya on 3/20/2018 at 12:28 PM.

## 2018-03-22 ENCOUNTER — THERAPY VISIT (OUTPATIENT)
Dept: PHYSICAL THERAPY | Facility: CLINIC | Age: 68
End: 2018-03-22
Payer: COMMERCIAL

## 2018-03-22 DIAGNOSIS — M54.59 MECHANICAL LOW BACK PAIN: Primary | ICD-10-CM

## 2018-03-22 DIAGNOSIS — M76.31 IT BAND SYNDROME, RIGHT: ICD-10-CM

## 2018-03-22 PROCEDURE — 97110 THERAPEUTIC EXERCISES: CPT | Mod: GP | Performed by: PHYSICAL THERAPIST

## 2018-03-22 PROCEDURE — 97161 PT EVAL LOW COMPLEX 20 MIN: CPT | Mod: GP | Performed by: PHYSICAL THERAPIST

## 2018-03-22 PROCEDURE — 97140 MANUAL THERAPY 1/> REGIONS: CPT | Mod: GP | Performed by: PHYSICAL THERAPIST

## 2018-03-22 NOTE — LETTER
[facility]  [address]  [address]  [place] [address]  [phone]    2018    Re: Steve José   :   1950  MRN:  [id number]   REFERRING PHYSICIAN:   MD [facility]  Date of Initial Evaluation:  3/22/2018  Visits:  Rxs Used: 1  Reason for Referral:     Mechanical low back pain  It band syndrome, right    EVALUATION SUMMARY    [facility] Initial Evaluation    Subjective:  Patient is a 67 year old male presenting with rehab back hpi. The history is provided by the patient.   Steve José is a 67 year old male with a lumbar condition.  Condition occurred with:  Degenerative joint disease. This is a recurrent condition  Pt reports onset of LBP and hip pain after period of prolonged sitting. DOI 3/20/18  The back pain has improved but pain spread to his Rt glut and lat thigh w/ a feeling of numbness over his thigh.  He has been doing a series of exercises that he was given in PT 2 years ago. .    Patient reports pain:  Lumbar spine left, lower lumbar spine, SI joint left and SI joint right.  Radiates to:  Gluteals right and thigh right.  Pain is described as aching and is intermittent and reported as 5/10.  Associated symptoms:  Loss of motion/stiffness. Pain is the same all the time and worse during the night.  Symptoms are exacerbated by sitting and bending and relieved by NSAID's, muscle relaxants, rest and heat.  Since onset symptoms are gradually improving and gradually worsening.  Special tests:  X-ray.  Previous treatment includes physical therapy.  There was significant improvement following previous treatment.  General health as reported by patient is good.  Pertinent medical history includes:  High blood pressure and other (eyes ). Current medications:  Anti-inflammatory, pain medication and muscle relaxants.  Current occupation is Retired Primary job tasks include:  Prolonged sitting.    Red flags:  None as reported by the  patient.    Objective:  Lumbar/SI Evaluation  Lumbar Myotomes:  normal  Lumbar DTR's:  not assessed  Cord Signs:  not assessed  Lumbar Dermtomes:  not assessed  Neural Tension/Mobility:  Lumbar:  Normal        Re: Steve RELL José   :   1950    Lumbar Palpation:    Tenderness present at Left:    Quadratus Lumborum  Tenderness present at Right: Quadratus Lumborum; Piriformis; Iliac Crest; Gluteus Medius and Greater Trochanter    SI joint/Sacrum:    Bilat SI dysf   Left positive at:    Ilium Ventromedial  Right positive at:    Ilium Ventromedial  Sacral conclusion left:  Posterior inominate, locked, upslip and inflare  Sacral conclusion right:  Upslip and outflare          Hip Evaluation  HIP AROM:  AROM:    Left Hip:     Normal    Right Hip:   Normal    Hip Strength:  : 4-4+/5 Rt hip abd and ext       Hip Special Testing:    Right hip positive for the following special tests:  Piriformis and Chelle'sRight hip negative for the following special tests:  Tierra; Fadir/Labrum or SLR      Hip Palpation:    Right hip tenderness present at:  Greater Trachanter; IT Band; Abductors and Gluteus Medius     Assessment/Plan:    Patient is a 67 year old male with lumbar, pelvic and right side hip complaints.    Patient has the following significant findings with corresponding treatment plan.                Diagnosis 1:  LBP  Pain -  mechanical traction, manual therapy, self management, directional preference exercise and home program  Decreased ROM/flexibility - manual therapy, therapeutic exercise and home program  Impaired muscle performance - neuro re-education  Decreased function - therapeutic activities  Diagnosis 2: Rt ITband    Pain -  hot/cold therapy, US, manual therapy, self management and home program  Decreased ROM/flexibility - manual therapy and therapeutic exercise  Decreased joint mobility - manual therapy and therapeutic exercise  Inflammation - cold therapy and US  Impaired muscle performance - neuro  re-education and home program  Decreased function - therapeutic activities    Therapy Evaluation Codes:   1) History comprised of:   Personal factors that impact the plan of care:      Coping style, Overall behavior pattern and Past/current experiences.    Comorbidity factors that impact the plan of care are:      High blood pressure.     Medications impacting care: Anti-inflammatory, High blood pressure, Muscle relaxant and Pain.  Re: Steve RELL José   :   1950    2) Examination of Body Systems comprised of:   Body structures and functions that impact the plan of care:      Hip, Lumbar spine and Pelvis.   Activity limitations that impact the plan of care are:      Bending, Sitting, Squatting/kneeling, Stairs, Standing and Walking.  3) Clinical presentation characteristics are:   Stable/Uncomplicated.  4) Decision-Making    Low complexity using standardized patient assessment instrument and/or measureable assessment of functional outcome.  Cumulative Therapy Evaluation is: Low complexity.    Previous and current functional limitations:  (See Goal Flow Sheet for this information)    Short term and Long term goals: (See Goal Flow Sheet for this information)     Communication ability:  Patient appears to be able to clearly communicate and understand verbal and written communication and follow directions correctly.  Treatment Explanation - The following has been discussed with the patient:   RX ordered/plan of care  Anticipated outcomes  Possible risks and side effects  This patient would benefit from PT intervention to resume normal activities.   Rehab potential is good.    Frequency:  1 X week, once daily  Duration:  for 6 weeks  Discharge Plan:  Achieve all LTG.  Independent in home treatment program.  Reach maximal therapeutic benefit.    Please refer to the daily flowsheet for treatment today, total treatment time and time spent performing 1:1 timed codes.         Thank you for your  referral.    INQUIRIES  Therapist: Donato Carreon, [facility]  [address] [address]  [place] [address]  Phone: [phone]  Fax: [phone]

## 2018-03-22 NOTE — MR AVS SNAPSHOT
After Visit Summary   3/22/2018    Steve José    MRN: [id number]           Patient Information     Date Of Birth          1950        Visit Information        Provider Department      3/22/2018 8:50 AM Donato Carreon, PT [facility]        Today's Diagnoses     Mechanical low back pain    -  1    It band syndrome, right           Follow-ups after your visit        Your next 10 appointments already scheduled     Mar 30, 2018  7:30 AM CDT   [facility] with Donato Carreon PT   [facility] ([facility])    [address]  [address]  [place] [address] [phone]              Who to contact     If you have questions or need follow up information about today's clinic visit or your schedule please contact [facility] directly at [phone].  Normal or non-critical lab and imaging results will be communicated to you by MyChart, letter or phone within 4 business days after the clinic has received the results. If you do not hear from us within 7 days, please contact the clinic through [employer] or phone. If you have a critical or abnormal lab result, we will notify you by phone as soon as possible.  Submit refill requests through [employer] or call your pharmacy and they will forward the refill request to us. Please allow 3 business days for your refill to be completed.          Additional Information About Your Visit        MyChart Information     [employer] gives you secure access to your electronic health record. If you see a primary care provider, you can also send messages to your care team and make appointments. If you have questions, please call your primary care clinic.  If you do not have a primary care provider, please call [phone] and they will assist you.        Care EveryWhere ID     This is your Care EveryWhere ID. This could be used by other organizations to access your [facility] [place] medical records  [id number]         Blood Pressure from Last 3 Encounters:    03/20/18 126/80   01/19/18 128/66   09/26/17 138/86    Weight from Last 3 Encounters:   03/20/18 81.6 kg (180 lb)   01/19/18 82.1 kg (181 lb)   09/26/17 78.9 kg (174 lb)              We Performed the Following     HC PT EVAL, LOW COMPLEXITY     [facility] INITIAL EVAL REPORT     MANUAL THER TECH,1+REGIONS,EA 15 MIN     THERAPEUTIC EXERCISES        Primary Care Provider Office Phone # Fax #    Bita Deluca [phone]       [address]  [place] [address]        Equal Access to Services     [facility]: Hadii aad ku hadasho Soomaali, waaxda luqadaha, qaybta kaalmada adeegyada, levi pierce . So [facility] [phone].    ATENCIÓN: Si habla español, tiene a ho disposición servicios gratuitos de asistencia lingüística. Llame al [phone].    We comply with applicable federal civil rights laws and [place] laws. We do not discriminate on the basis of race, color, national origin, age, disability, sex, sexual orientation, or gender identity.            Thank you!     Thank you for choosing [facility]  for your care. Our goal is always to provide you with excellent care. Hearing back from our patients is one way we can continue to improve our services. Please take a few minutes to complete the written survey that you may receive in the mail after your visit with us. Thank you!             Your Updated Medication List - Protect others around you: Learn how to safely use, store and throw away your medicines at www.disposemymeds.org.          This list is accurate as of 3/22/18 10:04 AM.  Always use your most recent med list.                   Brand Name Dispense Instructions for use Diagnosis    aspirin 81 MG tablet      Take 1 tablet by mouth daily.        atenolol 25 MG tablet    TENORMIN    90 tablet    Take 1 tablet (25 mg) by mouth daily    Hypertension goal BP (blood pressure) < 140/90       CALCIUM 600 + D PO      Take  by mouth daily.        finasteride 5 MG  tablet    PROSCAR    90 tablet    Take 1 tablet (5 mg) by mouth daily    Hypertrophy of prostate with urinary obstruction       hydrochlorothiazide 25 MG tablet    HYDRODIURIL    90 tablet    Take 1 tablet (25 mg) by mouth daily    Hypertension goal BP (blood pressure) < 140/90       metaxalone 800 MG tablet    SKELAXIN    30 tablet    Take 1 tablet (800 mg) by mouth 3 times daily as needed for moderate pain    Upper back pain       MULTI FOR HIM PO      Take  by mouth daily.        naproxen 500 MG tablet    NAPROSYN    30 tablet    Take 1 tablet (500 mg) by mouth 2 times daily as needed for moderate pain    Acute right-sided low back pain, with sciatica presence unspecified, Hip pain, right       potassium chloride SA 10 MEQ CR tablet    K-DUR/KLOR-CON M    90 tablet    Take 1 tablet (10 mEq) by mouth daily    Hypertension goal BP (blood pressure) < 140/90       ranitidine 150 MG tablet    ZANTAC    180 tablet    Take 1 tablet (150 mg) by mouth 2 times daily    Gastroesophageal reflux disease without esophagitis       simvastatin 10 MG tablet    ZOCOR    90 tablet    Take 1 tablet (10 mg) by mouth At Bedtime at bedtime.    Hyperlipidemia LDL goal <130       tiZANidine 4 MG tablet    ZANAFLEX    30 tablet    Take 1 tablet (4 mg) by mouth 3 times daily as needed for muscle spasms    Acute right-sided low back pain, with sciatica presence unspecified

## 2018-03-22 NOTE — PROGRESS NOTES
[facility] Initial Evaluation  Subjective:  Patient is a 67 year old male presenting with rehab back hpi. The history is provided by the patient.   Steve José is a 67 year old male with a lumbar condition.  Condition occurred with:  Degenerative joint disease.    This is a recurrent condition  Pt reports onset of LBP and hip pain after period of prolonged sitting. DOI 3/20/18  The back pain has improved but pain spread to his Rt glut and lat thigh w/ a feeling of numbness over his thigh.  He has been doing a series of exercises that he was given in PT 2 years ago. .    Patient reports pain:  Lumbar spine left, lower lumbar spine, SI joint left and SI joint right.  Radiates to:  Gluteals right and thigh right.  Pain is described as aching and is intermittent and reported as 5/10.  Associated symptoms:  Loss of motion/stiffness. Pain is the same all the time and worse during the night.  Symptoms are exacerbated by sitting and bending and relieved by NSAID's, muscle relaxants, rest and heat.  Since onset symptoms are gradually improving and gradually worsening.  Special tests:  X-ray.  Previous treatment includes physical therapy.  There was significant improvement following previous treatment.  General health as reported by patient is good.  Pertinent medical history includes:  High blood pressure and other (eyes ).      Current medications:  Anti-inflammatory, pain medication and muscle relaxants.  Current occupation is Retired .    Primary job tasks include:  Prolonged sitting.        Red flags:  None as reported by the patient.                        Objective:  System         Lumbar/SI Evaluation    Lumbar Myotomes:  normal            Lumbar DTR's:  not assessed      Cord Signs:  not assessed    Lumbar Dermtomes:  not assessed                Neural Tension/Mobility:  Lumbar:  Normal        Lumbar Palpation:    Tenderness present at Left:    Quadratus Lumborum  Tenderness present at Right:  Quadratus Lumborum; Piriformis; Iliac Crest; Gluteus Medius and Greater Trochanter        SI joint/Sacrum:    Bilat SI dysf     Left positive at:    Ilium Ventromedial  Right positive at:    Ilium Ventromedial  Sacral conclusion left:  Posterior inominate, locked, upslip and inflare  Sacral conclusion right:  Upslip and outflare                                  Hip Evaluation  HIP AROM:  AROM:    Left Hip:     Normal    Right Hip:   Normal                    Hip Strength:  : 4-4+/5 Rt hip abd and ext                           Hip Special Testing:       Right hip positive for the following special tests:  Piriformis and Chelle'sRight hip negative for the following special tests:  Tierra; Fadir/Labrum or SLR    Hip Palpation:      Right hip tenderness present at:  Greater Trachanter; IT Band; Abductors and Gluteus Medius             General     ROS    Assessment/Plan:    Patient is a 67 year old male with lumbar, pelvic and right side hip complaints.    Patient has the following significant findings with corresponding treatment plan.                Diagnosis 1:  LBP  Pain -  mechanical traction, manual therapy, self management, directional preference exercise and home program  Decreased ROM/flexibility - manual therapy, therapeutic exercise and home program  Impaired muscle performance - neuro re-education  Decreased function - therapeutic activities  Diagnosis 2: Rt ITband    Pain -  hot/cold therapy, US, manual therapy, self management and home program  Decreased ROM/flexibility - manual therapy and therapeutic exercise  Decreased joint mobility - manual therapy and therapeutic exercise  Inflammation - cold therapy and US  Impaired muscle performance - neuro re-education and home program  Decreased function - therapeutic activities    Therapy Evaluation Codes:   1) History comprised of:   Personal factors that impact the plan of care:      Coping style, Overall behavior pattern and Past/current experiences.     Comorbidity factors that impact the plan of care are:      High blood pressure.     Medications impacting care: Anti-inflammatory, High blood pressure, Muscle relaxant and Pain.  2) Examination of Body Systems comprised of:   Body structures and functions that impact the plan of care:      Hip, Lumbar spine and Pelvis.   Activity limitations that impact the plan of care are:      Bending, Sitting, Squatting/kneeling, Stairs, Standing and Walking.  3) Clinical presentation characteristics are:   Stable/Uncomplicated.  4) Decision-Making    Low complexity using standardized patient assessment instrument and/or measureable assessment of functional outcome.  Cumulative Therapy Evaluation is: Low complexity.    Previous and current functional limitations:  (See Goal Flow Sheet for this information)    Short term and Long term goals: (See Goal Flow Sheet for this information)     Communication ability:  Patient appears to be able to clearly communicate and understand verbal and written communication and follow directions correctly.  Treatment Explanation - The following has been discussed with the patient:   RX ordered/plan of care  Anticipated outcomes  Possible risks and side effects  This patient would benefit from PT intervention to resume normal activities.   Rehab potential is good.    Frequency:  1 X week, once daily  Duration:  for 6 weeks  Discharge Plan:  Achieve all LTG.  Independent in home treatment program.  Reach maximal therapeutic benefit.    Please refer to the daily flowsheet for treatment today, total treatment time and time spent performing 1:1 timed codes.

## 2018-03-30 ENCOUNTER — THERAPY VISIT (OUTPATIENT)
Dept: PHYSICAL THERAPY | Facility: CLINIC | Age: 68
End: 2018-03-30
Payer: COMMERCIAL

## 2018-03-30 DIAGNOSIS — M76.31 IT BAND SYNDROME, RIGHT: ICD-10-CM

## 2018-03-30 PROCEDURE — 97140 MANUAL THERAPY 1/> REGIONS: CPT | Mod: GP | Performed by: PHYSICAL THERAPIST

## 2018-03-30 PROCEDURE — 97110 THERAPEUTIC EXERCISES: CPT | Mod: GP | Performed by: PHYSICAL THERAPIST

## 2018-03-30 NOTE — MR AVS SNAPSHOT
After Visit Summary   3/30/2018    Steve José    MRN: [id number]           Patient Information     Date Of Birth          1950        Visit Information        Provider Department      3/30/2018 7:30 AM Donato Carreon, PT [facility]        Today's Diagnoses     It band syndrome, right           Follow-ups after your visit        Who to contact     If you have questions or need follow up information about today's clinic visit or your schedule please contact [facility] directly at [phone].  Normal or non-critical lab and imaging results will be communicated to you by [employer], letter or phone within 4 business days after the clinic has received the results. If you do not hear from us within 7 days, please contact the clinic through [employer] or phone. If you have a critical or abnormal lab result, we will notify you by phone as soon as possible.  Submit refill requests through [employer] or call your pharmacy and they will forward the refill request to us. Please allow 3 business days for your refill to be completed.          Additional Information About Your Visit        MyChart Information     [employer] gives you secure access to your electronic health record. If you see a primary care provider, you can also send messages to your care team and make appointments. If you have questions, please call your primary care clinic.  If you do not have a primary care provider, please call [phone] and they will assist you.        Care EveryWhere ID     This is your Care EveryWhere ID. This could be used by other organizations to access your [facility] [place] medical records  [id number]         Blood Pressure from Last 3 Encounters:   03/20/18 126/80   01/19/18 128/66   09/26/17 138/86    Weight from Last 3 Encounters:   03/20/18 81.6 kg (180 lb)   01/19/18 82.1 kg (181 lb)   09/26/17 78.9 kg (174 lb)              We Performed the Following     MANUAL THER TECH,1+REGIONS,EA 15 MIN     THERAPEUTIC  [facility]        Primary Care Provider Office Phone # Fax #    Bita Deluca [phone] [address]  [place] [address]        Equal Access to Services     [facility] : Hadii meenu ku bonnieo Sobetsyali, waaxda luqadaha, qaybta kaalmada adeaixa, levi cavanaughmaggie mike. So [facility] [phone].    ATENCIÓN: Si habla español, tiene a ho disposición servicios gratuitos de asistencia lingüística. Llame al [phone].    We comply with applicable federal civil rights laws and [place] laws. We do not discriminate on the basis of race, color, national origin, age, disability, sex, sexual orientation, or gender identity.            Thank you!     Thank you for choosing [facility]  for your care. Our goal is always to provide you with excellent care. Hearing back from our patients is one way we can continue to improve our services. Please take a few minutes to complete the written survey that you may receive in the mail after your visit with us. Thank you!             Your Updated Medication List - Protect others around you: Learn how to safely use, store and throw away your medicines at www.disposemymeds.org.          This list is accurate as of 3/30/18  8:04 AM.  Always use your most recent med list.                   Brand Name Dispense Instructions for use Diagnosis    aspirin 81 MG tablet      Take 1 tablet by mouth daily.        atenolol 25 MG tablet    TENORMIN    90 tablet    Take 1 tablet (25 mg) by mouth daily    Hypertension goal BP (blood pressure) < 140/90       CALCIUM 600 + D PO      Take  by mouth daily.        finasteride 5 MG tablet    PROSCAR    90 tablet    Take 1 tablet (5 mg) by mouth daily    Hypertrophy of prostate with urinary obstruction       hydrochlorothiazide 25 MG tablet    HYDRODIURIL    90 tablet    Take 1 tablet (25 mg) by mouth daily    Hypertension goal BP (blood pressure) < 140/90       metaxalone 800 MG tablet    SKELAXIN    30  tablet    Take 1 tablet (800 mg) by mouth 3 times daily as needed for moderate pain    Upper back pain       MULTI FOR HIM PO      Take  by mouth daily.        naproxen 500 MG tablet    NAPROSYN    30 tablet    Take 1 tablet (500 mg) by mouth 2 times daily as needed for moderate pain    Acute right-sided low back pain, with sciatica presence unspecified, Hip pain, right       potassium chloride SA 10 MEQ CR tablet    K-DUR/KLOR-CON M    90 tablet    Take 1 tablet (10 mEq) by mouth daily    Hypertension goal BP (blood pressure) < 140/90       ranitidine 150 MG tablet    ZANTAC    180 tablet    Take 1 tablet (150 mg) by mouth 2 times daily    Gastroesophageal reflux disease without esophagitis       simvastatin 10 MG tablet    ZOCOR    90 tablet    Take 1 tablet (10 mg) by mouth At Bedtime at bedtime.    Hyperlipidemia LDL goal <130       tiZANidine 4 MG tablet    ZANAFLEX    30 tablet    Take 1 tablet (4 mg) by mouth 3 times daily as needed for muscle spasms    Acute right-sided low back pain, with sciatica presence unspecified

## 2018-05-07 ENCOUNTER — OFFICE VISIT (OUTPATIENT)
Dept: UROLOGY | Facility: CLINIC | Age: 68
End: 2018-05-07
Payer: COMMERCIAL

## 2018-05-07 VITALS — HEART RATE: 59 BPM | SYSTOLIC BLOOD PRESSURE: 160 MMHG | OXYGEN SATURATION: 99 % | DIASTOLIC BLOOD PRESSURE: 82 MMHG

## 2018-05-07 DIAGNOSIS — R97.20 ELEVATED PROSTATE SPECIFIC ANTIGEN (PSA): Primary | ICD-10-CM

## 2018-05-07 DIAGNOSIS — N40.1 HYPERTROPHY OF PROSTATE WITH URINARY OBSTRUCTION: ICD-10-CM

## 2018-05-07 DIAGNOSIS — N13.8 HYPERTROPHY OF PROSTATE WITH URINARY OBSTRUCTION: ICD-10-CM

## 2018-05-07 DIAGNOSIS — I10 HYPERTENSION GOAL BP (BLOOD PRESSURE) < 140/90: ICD-10-CM

## 2018-05-07 LAB — PSA SERPL-MCNC: 4.83 UG/L (ref 0–4)

## 2018-05-07 PROCEDURE — 36415 COLL VENOUS BLD VENIPUNCTURE: CPT | Performed by: UROLOGY

## 2018-05-07 PROCEDURE — 84153 ASSAY OF PSA TOTAL: CPT | Performed by: UROLOGY

## 2018-05-07 PROCEDURE — 51798 US URINE CAPACITY MEASURE: CPT | Performed by: UROLOGY

## 2018-05-07 PROCEDURE — 99214 OFFICE O/P EST MOD 30 MIN: CPT | Mod: 25 | Performed by: UROLOGY

## 2018-05-07 RX ORDER — FINASTERIDE 5 MG/1
5 TABLET, FILM COATED ORAL DAILY
Qty: 90 TABLET | Refills: 3 | Status: SHIPPED | OUTPATIENT
Start: 2018-05-07 | End: 2019-04-30

## 2018-05-07 NOTE — PROGRESS NOTES
Chief Complaint   Patient presents with     RECHECK       Steve ZACARIAS Arnav is a 67 year old male who presents today for follow up of   Chief Complaint   Patient presents with     RECHECK    f/u benign prostatic hyperplasia/elevated psa.  He is s/p biopsy last year.  Prostate size was 70 cc.  He is on proscar.  He is doing well without any complaints.    Current Outpatient Prescriptions   Medication Sig Dispense Refill     aspirin 81 MG tablet Take 1 tablet by mouth daily.       atenolol (TENORMIN) 25 MG tablet Take 1 tablet (25 mg) by mouth daily 90 tablet 3     Calcium Carbonate-Vitamin D (CALCIUM 600 + D OR) Take  by mouth daily.       finasteride (PROSCAR) 5 MG tablet Take 1 tablet (5 mg) by mouth daily 90 tablet 3     hydrochlorothiazide (HYDRODIURIL) 25 MG tablet Take 1 tablet (25 mg) by mouth daily 90 tablet 3     metaxalone (SKELAXIN) 800 MG tablet Take 1 tablet (800 mg) by mouth 3 times daily as needed for moderate pain 30 tablet 1     Multiple Vitamins-Minerals (MULTI FOR HIM PO) Take  by mouth daily.       naproxen (NAPROSYN) 500 MG tablet Take 1 tablet (500 mg) by mouth 2 times daily as needed for moderate pain 30 tablet 1     potassium chloride SA (K-DUR/KLOR-CON M) 10 MEQ CR tablet Take 1 tablet (10 mEq) by mouth daily 90 tablet 3     ranitidine (ZANTAC) 150 MG tablet Take 1 tablet (150 mg) by mouth 2 times daily 180 tablet 3     simvastatin (ZOCOR) 10 MG tablet Take 1 tablet (10 mg) by mouth At Bedtime at bedtime. 90 tablet 3     tiZANidine (ZANAFLEX) 4 MG tablet Take 1 tablet (4 mg) by mouth 3 times daily as needed for muscle spasms 30 tablet 1     No Known Allergies   Past Medical History:   Diagnosis Date     BPH (benign prostatic hypertrophy)      Chronic low back pain 2001     Colon polyp 2000     Hyperlipidemia      Hypertension      Prostatitis     recurrent     Past Surgical History:   Procedure Laterality Date     APPENDECTOMY       C GENITAL SURG PROC,MALE UNLISTED  2001    left hydrocele      C GENITAL SURG PROC,MALE UNLISTED  1977    right ureteral reimplantation     HRW CORNEAL TRANSPLANT, CRNA       LASIK  2005     TURP  1998    x 2     Family History   Problem Relation Age of Onset     C.A.D. Father 64     d. MI     Hypertension Father      Hypertension Mother      CANCER Mother      skin     DIABETES No family hx of      Cancer - colorectal No family hx of      Social History     Social History     Marital status:      Spouse name: Amy      Number of children: 0     Years of education: 15     Occupational History     printing company sales Retired     Social History Main Topics     Smoking status: Never Smoker     Smokeless tobacco: Never Used     Alcohol use 0.0 oz/week      Comment: rare     Drug use: No     Sexual activity: Yes     Partners: Female     Other Topics Concern      Service No     Blood Transfusions No     unsure     Caffeine Concern No     Occupational Exposure No     Hobby Hazards No     Sleep Concern No     Stress Concern No     Weight Concern No     Special Diet No     watches intake of salt and sugar     Back Care No     Exercise Yes     everyday goes for mile and a half to two mile walk     Bike Helmet No     Seat Belt Yes     Self-Exams Yes     Social History Narrative       REVIEW OF SYSTEMS  =================  C: NEGATIVE for fever, chills, change in weight  I: NEGATIVE for worrisome rashes, moles or lesions  E/M: NEGATIVE for ear, mouth and throat problems  R: NEGATIVE for significant cough or SHORTNESS OF BREATH,   CV: NEGATIVE for chest pain, palpitations or peripheral edema  GI: NEGATIVE for nausea, abdominal pain, heartburn, or change in bowel habits  NEURO: NEGATIVE any motor/sensory changes  PSYCH: NEGATIVE for recent mood disorder    Physical Exam:  /82 (BP Location: Right arm, Patient Position: Chair, Cuff Size: Adult Large)  Pulse 59  SpO2 99%   Patient is pleasant, in no acute distress, good general condition.  Lung: no evidence of  respiratory distress    Abdomen: Soft, nondistended, non tender. No masses. No rebound or guarding.   Exam: no cva tenderness  Skin: Warm and dry.  No redness.  Psych: normal mood and affect  Neuro: alert and oriented    Assessment/Plan:   (R97.20) Elevated prostate specific antigen (PSA)  (primary encounter diagnosis)  Comment:    Plan: PSA tumor marker today    BPH: bladder scan 0 cc            Cont with proscar    HTN: Patient to follow up with Primary Care provider regarding elevated blood pressure.

## 2018-05-07 NOTE — LETTER
[facility]  [address] [place], [place] [address] [address]      May 8, 2018    Steve RELL José  [address]  [place] [place] [place] [address]          Dear Steve,    Enclosed is a copy of your results. Please follow up in one year with PSA and in clinic exam.    Results for orders placed or performed in visit on 05/07/18   PSA tumor marker   Result Value Ref Range    PSA 4.83 (H) 0 - 4 ug/L       If you have any questions or concerns, please call myself or my nurse at [phone].      Sincerely,        Semaj Sotelo MD

## 2018-05-07 NOTE — MR AVS SNAPSHOT
After Visit Summary   5/7/2018    Steve José    MRN: [id number]           Patient Information     Date Of Birth          1950        Visit Information        Provider Department      5/7/2018 10:30 AM Semaj Sotelo MD [facility]        Today's Diagnoses     Elevated prostate specific antigen (PSA)    -  1    Hypertrophy of prostate with urinary obstruction        Hypertension goal BP (blood pressure) < 140/90           Follow-ups after your visit        Future tests that were ordered for you today     Open Future Orders        Priority Expected Expires Ordered    PSA tumor marker Routine 5/7/2019 5/7/2019 5/7/2018            Who to contact     If you have questions or need follow up information about today's clinic visit or your schedule please contact [facility] directly at [phone].  Normal or non-critical lab and imaging results will be communicated to you by MyChart, letter or phone within 4 business days after the clinic has received the results. If you do not hear from us within 7 days, please contact the clinic through MyChart or phone. If you have a critical or abnormal lab result, we will notify you by phone as soon as possible.  Submit refill requests through [employer] or call your pharmacy and they will forward the refill request to us. Please allow 3 business days for your refill to be completed.          Additional Information About Your Visit        MyChart Information     [employer] gives you secure access to your electronic health record. If you see a primary care provider, you can also send messages to your care team and make appointments. If you have questions, please call your primary care clinic.  If you do not have a primary care provider, please call [phone] and they will assist you.        Care EveryWhere ID     This is your Care EveryWhere ID. This could be used by other organizations to access your [facility]  records  [id number]        Your Vitals Were     Pulse Pulse Oximetry                59 99%           Blood Pressure from Last 3 Encounters:   05/07/18 160/82   03/20/18 126/80   01/19/18 128/66    Weight from Last 3 Encounters:   03/20/18 81.6 kg (180 lb)   01/19/18 82.1 kg (181 lb)   09/26/17 78.9 kg (174 lb)              We Performed the Following     MEASURE POST-VOID RESIDUAL URINE/BLADDER CAPACITY, US NON-IMAGING (84503)     PSA tumor marker          Where to get your medicines      These medications were sent to [facility] [address] [facility], [place] - [address] [facility] [address]  [address] [facility] [address]     Phone:  [phone]     finasteride 5 MG tablet          Primary Care Provider Office Phone # Fax #    Bita Deluca [phone] [address] [facility] [address]        Equal Access to Services     [facility] : Hadii aad ku hadasho Soomaali, waaxda luqadaha, qaybta kaalmada adeegyada, waxay omarin haydawna pierce . So [facility] [phone].    ATENCIÓN: Si habla español, tiene a ho disposición servicios gratuitos de asistencia lingüística. Llame al [phone].    We comply with applicable federal civil rights laws and [place] laws. We do not discriminate on the basis of race, color, national origin, age, disability, sex, sexual orientation, or gender identity.            Thank you!     Thank you for choosing [facility]  for your care. Our goal is always to provide you with excellent care. Hearing back from our patients is one way we can continue to improve our services. Please take a few minutes to complete the written survey that you may receive in the mail after your visit with us. Thank you!             Your Updated Medication List - Protect others around you: Learn how to safely use, store and throw away your medicines at www.disposemymeds.org.          This list is accurate as of 5/7/18 10:42 AM.  Always use your most recent  med list.                   Brand Name Dispense Instructions for use Diagnosis    aspirin 81 MG tablet      Take 1 tablet by mouth daily.        atenolol 25 MG tablet    TENORMIN    90 tablet    Take 1 tablet (25 mg) by mouth daily    Hypertension goal BP (blood pressure) < 140/90       CALCIUM 600 + D PO      Take  by mouth daily.        finasteride 5 MG tablet    PROSCAR    90 tablet    Take 1 tablet (5 mg) by mouth daily    Hypertrophy of prostate with urinary obstruction       hydrochlorothiazide 25 MG tablet    HYDRODIURIL    90 tablet    Take 1 tablet (25 mg) by mouth daily    Hypertension goal BP (blood pressure) < 140/90       metaxalone 800 MG tablet    SKELAXIN    30 tablet    Take 1 tablet (800 mg) by mouth 3 times daily as needed for moderate pain    Upper back pain       MULTI FOR HIM PO      Take  by mouth daily.        naproxen 500 MG tablet    NAPROSYN    30 tablet    Take 1 tablet (500 mg) by mouth 2 times daily as needed for moderate pain    Acute right-sided low back pain, with sciatica presence unspecified, Hip pain, right       potassium chloride SA 10 MEQ CR tablet    K-DUR/KLOR-CON M    90 tablet    Take 1 tablet (10 mEq) by mouth daily    Hypertension goal BP (blood pressure) < 140/90       ranitidine 150 MG tablet    ZANTAC    180 tablet    Take 1 tablet (150 mg) by mouth 2 times daily    Gastroesophageal reflux disease without esophagitis       simvastatin 10 MG tablet    ZOCOR    90 tablet    Take 1 tablet (10 mg) by mouth At Bedtime at bedtime.    Hyperlipidemia LDL goal <130       tiZANidine 4 MG tablet    ZANAFLEX    30 tablet    Take 1 tablet (4 mg) by mouth 3 times daily as needed for muscle spasms    Acute right-sided low back pain, with sciatica presence unspecified

## 2018-05-08 DIAGNOSIS — R97.20 ELEVATED PROSTATE SPECIFIC ANTIGEN (PSA): Primary | ICD-10-CM

## 2018-09-05 PROBLEM — M76.31 IT BAND SYNDROME, RIGHT: Status: RESOLVED | Noted: 2018-03-22 | Resolved: 2018-09-05

## 2018-09-05 NOTE — PROGRESS NOTES
Subjective:  HPI                    Objective:  System    Physical Exam    General     ROS    Assessment/Plan:    DISCHARGE REPORT    Progress reporting period is from 3.22  to 9.5.18.     SUBJECTIVE  Subjective: better, less meds needed, doing exs easier    Current Pain level: 4/10   Initial Pain level: 6/10   Changes in function: Yes, see goal flow sheet for change in function   Adverse reactions: None;   ,     Patient has failed to return to therapy so current objective findings are unknown.    OBJECTIVE  Objective: +flare, +pub ascend left       ASSESSMENT/PLAN  Updated problem list and treatment plan: Diagnosis 1:  LBP    STG/LTGs have been met or progress has been made towards goals:  None  Assessment of Progress: Patient has not returned to therapy.  Current status is unknown and discharge G code cannot be reported.  Self Management Plans:  Patient has been instructed in a home treatment program.  Patient  has been instructed in self management of symptoms.    Steve continues to require the following intervention to meet STG and LTG's:   The patient failed to complete scheduled/ordered appointments so current information is unknown.  We will discharge this patient from PT.    Recommendations:      Please refer to the daily flowsheet for treatment today, total treatment time and time spent performing 1:1 timed codes.

## 2018-10-22 ENCOUNTER — OFFICE VISIT (OUTPATIENT)
Dept: FAMILY MEDICINE | Facility: CLINIC | Age: 68
End: 2018-10-22
Payer: COMMERCIAL

## 2018-10-22 VITALS
OXYGEN SATURATION: 100 % | SYSTOLIC BLOOD PRESSURE: 136 MMHG | HEIGHT: 72 IN | HEART RATE: 76 BPM | BODY MASS INDEX: 24.19 KG/M2 | TEMPERATURE: 97.3 F | RESPIRATION RATE: 20 BRPM | DIASTOLIC BLOOD PRESSURE: 86 MMHG | WEIGHT: 178.6 LBS

## 2018-10-22 DIAGNOSIS — I10 HYPERTENSION GOAL BP (BLOOD PRESSURE) < 140/90: ICD-10-CM

## 2018-10-22 DIAGNOSIS — E78.5 HYPERLIPIDEMIA LDL GOAL <130: Primary | ICD-10-CM

## 2018-10-22 DIAGNOSIS — K21.9 GASTROESOPHAGEAL REFLUX DISEASE WITHOUT ESOPHAGITIS: ICD-10-CM

## 2018-10-22 DIAGNOSIS — M54.5 RIGHT LOW BACK PAIN, UNSPECIFIED CHRONICITY, WITH SCIATICA PRESENCE UNSPECIFIED: ICD-10-CM

## 2018-10-22 LAB
ALT SERPL W P-5'-P-CCNC: 26 U/L (ref 0–70)
ANION GAP SERPL CALCULATED.3IONS-SCNC: 4 MMOL/L (ref 3–14)
AST SERPL W P-5'-P-CCNC: 18 U/L (ref 0–45)
BUN SERPL-MCNC: 24 MG/DL (ref 7–30)
CALCIUM SERPL-MCNC: 8.6 MG/DL (ref 8.5–10.1)
CHLORIDE SERPL-SCNC: 102 MMOL/L (ref 94–109)
CHOLEST SERPL-MCNC: 139 MG/DL
CO2 SERPL-SCNC: 35 MMOL/L (ref 20–32)
CREAT SERPL-MCNC: 0.86 MG/DL (ref 0.66–1.25)
GFR SERPL CREATININE-BSD FRML MDRD: 88 ML/MIN/1.7M2
GLUCOSE SERPL-MCNC: 105 MG/DL (ref 70–99)
HDLC SERPL-MCNC: 60 MG/DL
LDLC SERPL CALC-MCNC: 67 MG/DL
NONHDLC SERPL-MCNC: 79 MG/DL
POTASSIUM SERPL-SCNC: 3.9 MMOL/L (ref 3.4–5.3)
SODIUM SERPL-SCNC: 141 MMOL/L (ref 133–144)
TRIGL SERPL-MCNC: 60 MG/DL

## 2018-10-22 PROCEDURE — 80048 BASIC METABOLIC PNL TOTAL CA: CPT | Performed by: FAMILY MEDICINE

## 2018-10-22 PROCEDURE — 84450 TRANSFERASE (AST) (SGOT): CPT | Performed by: FAMILY MEDICINE

## 2018-10-22 PROCEDURE — 80061 LIPID PANEL: CPT | Performed by: FAMILY MEDICINE

## 2018-10-22 PROCEDURE — 84460 ALANINE AMINO (ALT) (SGPT): CPT | Performed by: FAMILY MEDICINE

## 2018-10-22 PROCEDURE — 36415 COLL VENOUS BLD VENIPUNCTURE: CPT | Performed by: FAMILY MEDICINE

## 2018-10-22 PROCEDURE — 99214 OFFICE O/P EST MOD 30 MIN: CPT | Performed by: FAMILY MEDICINE

## 2018-10-22 RX ORDER — ATENOLOL 25 MG/1
25 TABLET ORAL 2 TIMES DAILY
Qty: 180 TABLET | Refills: 3 | Status: SHIPPED | OUTPATIENT
Start: 2018-10-22 | End: 2019-04-08

## 2018-10-22 ASSESSMENT — PAIN SCALES - GENERAL: PAINLEVEL: NO PAIN (0)

## 2018-10-22 NOTE — LETTER
[facility]  [address] [place] [address]    October 22, 2018    Steve RELL José  [address]  [place] [address]          Dear Steve,    Electrolytes and kidney tests are normal.   Your cholesterol results are normal.   Take care    Enclosed is a copy of your results.     Results for orders placed or performed in visit on 10/22/18   Lipid panel reflex to direct LDL Fasting   Result Value Ref Range    Cholesterol 139 <200 mg/dL    Triglycerides 60 <150 mg/dL    HDL Cholesterol 60 >39 mg/dL    LDL Cholesterol Calculated 67 <100 mg/dL    Non HDL Cholesterol 79 <130 mg/dL   Basic metabolic panel   Result Value Ref Range    Sodium 141 133 - 144 mmol/L    Potassium 3.9 3.4 - 5.3 mmol/L    Chloride 102 94 - 109 mmol/L    Carbon Dioxide 35 (H) 20 - 32 mmol/L    Anion Gap 4 3 - 14 mmol/L    Glucose 105 (H) 70 - 99 mg/dL    Urea Nitrogen 24 7 - 30 mg/dL    Creatinine 0.86 0.66 - 1.25 mg/dL    GFR Estimate 88 >60 mL/min/1.7m2    GFR Estimate If Black >90 >60 mL/min/1.7m2    Calcium 8.6 8.5 - 10.1 mg/dL   AST   Result Value Ref Range    AST 18 0 - 45 U/L   ALT   Result Value Ref Range    ALT 26 0 - 70 U/L       If you have any questions or concerns, please call myself or my nurse at [phone].      Sincerely,        Bita Deluca MD/gini

## 2018-10-22 NOTE — MR AVS SNAPSHOT
After Visit Summary   10/22/2018    Steve José    MRN: [id number]           Patient Information     Date Of Birth          1950        Visit Information        Provider Department      10/22/2018 8:40 AM Bita Deluca MD [facility]        Today's Diagnoses     Hyperlipidemia LDL goal <130    -  1    Hypertension goal BP (blood pressure) < 140/90        Right low back pain, unspecified chronicity, with sciatica presence unspecified        Gastroesophageal reflux disease without esophagitis           Follow-ups after your visit        Follow-up notes from your care team     Return in about 6 months (around 4/22/2019) for Med check.      Who to contact     If you have questions or need follow up information about today's clinic visit or your schedule please contact [facility] directly at [phone].  Normal or non-critical lab and imaging results will be communicated to you by MyChart, letter or phone within 4 business days after the clinic has received the results. If you do not hear from us within 7 days, please contact the clinic through [employer] or phone. If you have a critical or abnormal lab result, we will notify you by phone as soon as possible.  Submit refill requests through [employer] or call your pharmacy and they will forward the refill request to us. Please allow 3 business days for your refill to be completed.          Additional Information About Your Visit        MyChart Information     [employer] gives you secure access to your electronic health record. If you see a primary care provider, you can also send messages to your care team and make appointments. If you have questions, please call your primary care clinic.  If you do not have a primary care provider, please call [phone] and they will assist you.        Care EveryWhere ID     This is your Care EveryWhere ID. This could be used by other organizations to access your [facility]  records  [id number]        Your Vitals Were     Pulse Temperature Respirations Height Pulse Oximetry BMI (Body Mass Index)    76 97.3  F (36.3  C) (Oral) 20 6' (1.829 m) 100% 24.22 kg/m2       Blood Pressure from Last 3 Encounters:   10/22/18 150/86   05/07/18 160/82   03/20/18 126/80    Weight from Last 3 Encounters:   10/22/18 178 lb 9.6 oz (81 kg)   03/20/18 180 lb (81.6 kg)   01/19/18 181 lb (82.1 kg)              We Performed the Following     ALT     AST     Basic metabolic panel     Lipid panel reflex to direct LDL Fasting          Today's Medication Changes          These changes are accurate as of 10/22/18  9:07 AM.  If you have any questions, ask your nurse or doctor.               These medicines have changed or have updated prescriptions.        Dose/Directions    atenolol 25 MG tablet   Commonly known as:  TENORMIN   This may have changed:  when to take this   Used for:  Hypertension goal BP (blood pressure) < 140/90   Changed by:  Bita Deluca MD        Dose:  25 mg   Take 1 tablet (25 mg) by mouth 2 times daily   Quantity:  180 tablet   Refills:  3         Stop taking these medicines if you haven't already. Please contact your care team if you have questions.     metaxalone 800 MG tablet   Commonly known as:  SKELAXIN   Stopped by:  Bita Deluca MD                Where to get your medicines      These medications were sent to [facility] [place]  [place] - [address] [place] [address]  [address] [facility] [address], [place] [address]     Phone:  [phone]     atenolol 25 MG tablet    tiZANidine 4 MG tablet                Primary Care Provider Office Phone # Fax #    Bita Deluca [phone] [address] [facility] [address]        Equal Access to Services     [facility] VICTOR MANUEL : Vance nicoleo Sodimas, waaxda luqadaha, qaybta kaalmada adeegyada, levi mckeon. So [facility] [phone].    ATENCIÓN: Si pretty espjeannie, sunil carballo ho  disposición servicios gratuitos de asistencia lingüística. Javi justin [phone].    We comply with applicable federal civil rights laws and [place] laws. We do not discriminate on the basis of race, color, national origin, age, disability, sex, sexual orientation, or gender identity.            Thank you!     Thank you for choosing [facility]  for your care. Our goal is always to provide you with excellent care. Hearing back from our patients is one way we can continue to improve our services. Please take a few minutes to complete the written survey that you may receive in the mail after your visit with us. Thank you!             Your Updated Medication List - Protect others around you: Learn how to safely use, store and throw away your medicines at www.disposemymeds.org.          This list is accurate as of 10/22/18  9:07 AM.  Always use your most recent med list.                   Brand Name Dispense Instructions for use Diagnosis    aspirin 81 MG tablet      Take 1 tablet by mouth daily.        atenolol 25 MG tablet    TENORMIN    180 tablet    Take 1 tablet (25 mg) by mouth 2 times daily    Hypertension goal BP (blood pressure) < 140/90       CALCIUM 600 + D PO      Take  by mouth daily.        finasteride 5 MG tablet    PROSCAR    90 tablet    Take 1 tablet (5 mg) by mouth daily    Hypertrophy of prostate with urinary obstruction       hydrochlorothiazide 25 MG tablet    HYDRODIURIL    90 tablet    Take 1 tablet (25 mg) by mouth daily    Hypertension goal BP (blood pressure) < 140/90       MULTI FOR HIM PO      Take  by mouth daily.        naproxen 500 MG tablet    NAPROSYN    30 tablet    Take 1 tablet (500 mg) by mouth 2 times daily as needed for moderate pain    Acute right-sided low back pain, with sciatica presence unspecified, Hip pain, right       potassium chloride SA 10 MEQ CR tablet    K-DUR/KLOR-CON M    90 tablet    Take 1 tablet (10 mEq) by mouth daily    Hypertension goal BP  (blood pressure) < 140/90       ranitidine 150 MG tablet    ZANTAC    180 tablet    Take 1 tablet (150 mg) by mouth 2 times daily    Gastroesophageal reflux disease without esophagitis       simvastatin 10 MG tablet    ZOCOR    90 tablet    Take 1 tablet (10 mg) by mouth At Bedtime at bedtime.    Hyperlipidemia LDL goal <130       tiZANidine 4 MG tablet    ZANAFLEX    30 tablet    Take 1 tablet (4 mg) by mouth 3 times daily as needed for muscle spasms    Right low back pain, unspecified chronicity, with sciatica presence unspecified

## 2018-10-22 NOTE — PROGRESS NOTES
SUBJECTIVE:   Steve José is a 68 year old male who presents to clinic today for the following health issues:      Hyperlipidemia Follow-Up      Rate your low fat/cholesterol diet?: fair    Taking statin?  Yes, no muscle aches from statin    Other lipid medications/supplements?:  none    Hypertension Follow-up      Outpatient blood pressures are not being checked.    Low Salt Diet: no added salt      Amount of exercise or physical activity: 6-7 days/week for an average of 15-30 minutes    Problems taking medications regularly: No    Medication side effects: none    Diet: low salt    GErd is stable   The patient denies abdominal or flank pain, anorexia, nausea or vomiting, dysphagia, change in bowel habits or black or bloody stools.          Problem list and histories reviewed & adjusted, as indicated.  Additional history: as documented    Patient Active Problem List   Diagnosis     GERD (gastroesophageal reflux disease)     Hyperlipidemia LDL goal <130     Hypertension goal BP (blood pressure) < 140/90     Advanced directives, counseling/discussion     Hypertrophy of prostate with urinary obstruction     Past Surgical History:   Procedure Laterality Date     ABDOMEN SURGERY  1978    Right ureter reimplantation     APPENDECTOMY       BIOPSY  five times    Prostate     C GENITAL SURG PROC,MALE UNLISTED  2001    left hydrocele     C GENITAL SURG PROC,MALE UNLISTED  1977    right ureteral reimplantation     COLONOSCOPY  1/6/2018     HRW CORNEAL TRANSPLANT, CRNA       LASIK  2005     TURP  1998    x 2       Social History   Substance Use Topics     Smoking status: Never Smoker     Smokeless tobacco: Never Used     Alcohol use 0.0 oz/week      Comment: rare     Family History   Problem Relation Age of Onset     C.A.D. Father 64     d. MI     Hypertension Father      Hypertension Mother      Cancer Mother      skin     Diabetes No family hx of      Cancer - colorectal No family hx of          Current Outpatient  Prescriptions   Medication Sig Dispense Refill     aspirin 81 MG tablet Take 1 tablet by mouth daily.       atenolol (TENORMIN) 25 MG tablet Take 1 tablet (25 mg) by mouth 2 times daily 180 tablet 3     Calcium Carbonate-Vitamin D (CALCIUM 600 + D OR) Take  by mouth daily.       finasteride (PROSCAR) 5 MG tablet Take 1 tablet (5 mg) by mouth daily 90 tablet 3     hydrochlorothiazide (HYDRODIURIL) 25 MG tablet Take 1 tablet (25 mg) by mouth daily 90 tablet 3     Multiple Vitamins-Minerals (MULTI FOR HIM PO) Take  by mouth daily.       naproxen (NAPROSYN) 500 MG tablet Take 1 tablet (500 mg) by mouth 2 times daily as needed for moderate pain 30 tablet 1     potassium chloride SA (K-DUR/KLOR-CON M) 10 MEQ CR tablet Take 1 tablet (10 mEq) by mouth daily 90 tablet 3     ranitidine (ZANTAC) 150 MG tablet Take 1 tablet (150 mg) by mouth 2 times daily 180 tablet 3     simvastatin (ZOCOR) 10 MG tablet Take 1 tablet (10 mg) by mouth At Bedtime at bedtime. 90 tablet 3     tiZANidine (ZANAFLEX) 4 MG tablet Take 1 tablet (4 mg) by mouth 3 times daily as needed for muscle spasms 30 tablet 1     [DISCONTINUED] atenolol (TENORMIN) 25 MG tablet Take 1 tablet (25 mg) by mouth daily 90 tablet 3     No Known Allergies  Recent Labs   Lab Test  03/20/18   1229  09/26/17   1110   10/06/16   0843   10/08/15   0927  04/17/15   0822  10/23/14   0924   10/15/13   0806   11/02/10   0938   A1C   --    --    --    --    --    --   5.8   --    --    --    --    --    LDL   --   52   --   71   --   62   --   62   --   64   < >  87   HDL   --   66   --   57   --   54   --   56   --   41   < >  42   TRIG   --   60   --   55   --   46   --   56   --   92   < >  76   ALT   --    --    --   29   --    --    --   25   --   31   < >  33   CR  0.80  0.82   < >  0.83   < >  0.83  0.80   --    < >  0.81   < >  0.82   GFRESTIMATED  >90  >90   < >  >90  Non  GFR Calc     < >  >90  Non  GFR Calc    >90  Non  GFR  Calc     --    < >  >90   < >  >90   GFRESTBLACK  >90  >90   < >  >90   GFR Calc     < >  >90   GFR Calc    >90   GFR Calc     --    < >  >90   < >  >90   POTASSIUM  4.2  4.1   < >  4.2   < >  3.9  3.8   --    < >  3.9   < >  4.4   TSH   --    --    --    --    --    --    --    --    --    --    --   1.15    < > = values in this interval not displayed.      BP Readings from Last 3 Encounters:   10/22/18 150/86   05/07/18 160/82   03/20/18 126/80    Wt Readings from Last 3 Encounters:   10/22/18 178 lb 9.6 oz (81 kg)   03/20/18 180 lb (81.6 kg)   01/19/18 181 lb (82.1 kg)                  Labs reviewed in EPIC    Reviewed and updated as needed this visit by clinical staff       Reviewed and updated as needed this visit by Provider         ROS:  CONSTITUTIONAL: NEGATIVE for fever, chills, change in weight  ENT/MOUTH: NEGATIVE for ear, mouth and throat problems  RESP: NEGATIVE for significant cough or SOB  CV: NEGATIVE for chest pain, palpitations or peripheral edema  GI: NEGATIVE for nausea, abdominal pain, heartburn, or change in bowel habits    OBJECTIVE:     /86  Pulse 76  Temp 97.3  F (36.3  C) (Oral)  Resp 20  Ht 6' (1.829 m)  Wt 178 lb 9.6 oz (81 kg)  SpO2 100%  BMI 24.22 kg/m2  Body mass index is 24.22 kg/(m^2).  GENERAL: healthy, alert and no distress  NECK: no adenopathy, no asymmetry, masses, or scars and thyroid normal to palpation  RESP: lungs clear to auscultation - no rales, rhonchi or wheezes  CV: regular rate and rhythm, normal S1 S2, no S3 or S4, no murmur, click or rub, no peripheral edema and peripheral pulses strong  ABDOMEN: soft, nontender, no hepatosplenomegaly, no masses and bowel sounds normal  MS: no gross musculoskeletal defects noted, no edema  PSYCH: mentation appears normal, affect normal/bright    Diagnostic Test Results:  Pending     ASSESSMENT/PLAN:     1. Hyperlipidemia LDL goal <130  Pending   - Lipid panel reflex to  direct LDL Fasting  - AST  - ALT    2. Hypertension goal BP (blood pressure) < 140/90  Advised increase atenolol to 25 mg BID  Advised ancillary BP check 1 month  - Basic metabolic panel  - atenolol (TENORMIN) 25 MG tablet; Take 1 tablet (25 mg) by mouth 2 times daily  Dispense: 180 tablet; Refill: 3    3. Right low back pain, unspecified chronicity, with sciatica presence unspecified  Takes it occasionally-refilled  - tiZANidine (ZANAFLEX) 4 MG tablet; Take 1 tablet (4 mg) by mouth 3 times daily as needed for muscle spasms  Dispense: 30 tablet; Refill: 1    4. Gastroesophageal reflux disease without esophagitis  Stable       Follow up 6 months with me    Bita Deluca MD  [facility]

## 2019-04-08 ENCOUNTER — OFFICE VISIT (OUTPATIENT)
Dept: FAMILY MEDICINE | Facility: CLINIC | Age: 69
End: 2019-04-08
Payer: COMMERCIAL

## 2019-04-08 VITALS
HEART RATE: 64 BPM | TEMPERATURE: 97 F | RESPIRATION RATE: 16 BRPM | BODY MASS INDEX: 24.11 KG/M2 | WEIGHT: 178 LBS | DIASTOLIC BLOOD PRESSURE: 80 MMHG | OXYGEN SATURATION: 100 % | HEIGHT: 72 IN | SYSTOLIC BLOOD PRESSURE: 152 MMHG

## 2019-04-08 DIAGNOSIS — I10 HYPERTENSION GOAL BP (BLOOD PRESSURE) < 140/90: ICD-10-CM

## 2019-04-08 DIAGNOSIS — E78.5 HYPERLIPIDEMIA LDL GOAL <130: ICD-10-CM

## 2019-04-08 DIAGNOSIS — N13.8 HYPERTROPHY OF PROSTATE WITH URINARY OBSTRUCTION: ICD-10-CM

## 2019-04-08 DIAGNOSIS — N40.1 HYPERTROPHY OF PROSTATE WITH URINARY OBSTRUCTION: ICD-10-CM

## 2019-04-08 DIAGNOSIS — M54.5 RIGHT LOW BACK PAIN, UNSPECIFIED CHRONICITY, WITH SCIATICA PRESENCE UNSPECIFIED: ICD-10-CM

## 2019-04-08 LAB
ALBUMIN SERPL-MCNC: 3.8 G/DL (ref 3.4–5)
ALP SERPL-CCNC: 60 U/L (ref 40–150)
ALT SERPL W P-5'-P-CCNC: 23 U/L (ref 0–70)
ANION GAP SERPL CALCULATED.3IONS-SCNC: 7 MMOL/L (ref 3–14)
AST SERPL W P-5'-P-CCNC: 21 U/L (ref 0–45)
BILIRUB SERPL-MCNC: 0.7 MG/DL (ref 0.2–1.3)
BUN SERPL-MCNC: 24 MG/DL (ref 7–30)
CALCIUM SERPL-MCNC: 9 MG/DL (ref 8.5–10.1)
CHLORIDE SERPL-SCNC: 104 MMOL/L (ref 94–109)
CHOLEST SERPL-MCNC: 133 MG/DL
CO2 SERPL-SCNC: 30 MMOL/L (ref 20–32)
CREAT SERPL-MCNC: 0.9 MG/DL (ref 0.66–1.25)
GFR SERPL CREATININE-BSD FRML MDRD: 86 ML/MIN/{1.73_M2}
GLUCOSE SERPL-MCNC: 98 MG/DL (ref 70–99)
HDLC SERPL-MCNC: 66 MG/DL
LDLC SERPL CALC-MCNC: 55 MG/DL
NONHDLC SERPL-MCNC: 67 MG/DL
POTASSIUM SERPL-SCNC: 4 MMOL/L (ref 3.4–5.3)
PROT SERPL-MCNC: 6.7 G/DL (ref 6.8–8.8)
PSA SERPL-ACNC: 3.99 UG/L (ref 0–4)
SODIUM SERPL-SCNC: 141 MMOL/L (ref 133–144)
TRIGL SERPL-MCNC: 62 MG/DL

## 2019-04-08 PROCEDURE — 36415 COLL VENOUS BLD VENIPUNCTURE: CPT | Performed by: FAMILY MEDICINE

## 2019-04-08 PROCEDURE — 99214 OFFICE O/P EST MOD 30 MIN: CPT | Performed by: FAMILY MEDICINE

## 2019-04-08 PROCEDURE — G0103 PSA SCREENING: HCPCS | Performed by: FAMILY MEDICINE

## 2019-04-08 PROCEDURE — 80061 LIPID PANEL: CPT | Performed by: FAMILY MEDICINE

## 2019-04-08 PROCEDURE — 80053 COMPREHEN METABOLIC PANEL: CPT | Performed by: FAMILY MEDICINE

## 2019-04-08 RX ORDER — ATENOLOL 25 MG/1
TABLET ORAL
Qty: 135 TABLET | Refills: 0 | Status: SHIPPED | OUTPATIENT
Start: 2019-04-08 | End: 2019-05-23

## 2019-04-08 RX ORDER — POTASSIUM CHLORIDE 750 MG/1
10 TABLET, EXTENDED RELEASE ORAL DAILY
Qty: 90 TABLET | Refills: 3 | Status: SHIPPED | OUTPATIENT
Start: 2019-04-08 | End: 2020-03-25

## 2019-04-08 RX ORDER — HYDROCHLOROTHIAZIDE 25 MG/1
25 TABLET ORAL DAILY
Qty: 90 TABLET | Refills: 3 | Status: SHIPPED | OUTPATIENT
Start: 2019-04-08 | End: 2020-03-13

## 2019-04-08 RX ORDER — SIMVASTATIN 10 MG
10 TABLET ORAL AT BEDTIME
Qty: 90 TABLET | Refills: 3 | Status: SHIPPED | OUTPATIENT
Start: 2019-04-08 | End: 2020-03-13

## 2019-04-08 ASSESSMENT — MIFFLIN-ST. JEOR: SCORE: 1605.4

## 2019-04-08 NOTE — PROGRESS NOTES
SUBJECTIVE:                                                    Steve José is a 68 year old male who presents to clinic today for the following health issues:      Hyperlipidemia Follow-Up      Rate your low fat/cholesterol diet?: fair    Taking statin?  No    Other lipid medications/supplements?:  none    Hypertension Follow-up      Outpatient blood pressures are not being checked.    Low Salt Diet: no added salt      Amount of exercise or physical activity: 4-5 days/week for an average of 30-45 minutes    Problems taking medications regularly: No    Medication side effects: none    Diet: low salt      Problem list and histories reviewed & adjusted, as indicated.  Additional history: as documented    Patient Active Problem List   Diagnosis     GERD (gastroesophageal reflux disease)     Hyperlipidemia LDL goal <130     Hypertension goal BP (blood pressure) < 140/90     Advanced directives, counseling/discussion     Hypertrophy of prostate with urinary obstruction     Past Surgical History:   Procedure Laterality Date     ABDOMEN SURGERY  1978    Right ureter reimplantation     APPENDECTOMY       BIOPSY  five times    Prostate     C GENITAL SURG PROC,MALE UNLISTED  2001    left hydrocele     C GENITAL SURG PROC,MALE UNLISTED  1977    right ureteral reimplantation     COLONOSCOPY  1/6/2018     HRW CORNEAL TRANSPLANT, CRNA       LASIK  2005     TURP  1998    x 2       Social History     Tobacco Use     Smoking status: Never Smoker     Smokeless tobacco: Never Used   Substance Use Topics     Alcohol use: Yes     Alcohol/week: 0.0 oz     Comment: rare     Family History   Problem Relation Age of Onset     C.A.D. Father 64        d. MI     Hypertension Father      Hypertension Mother      Cancer Mother         skin     Diabetes No family hx of      Cancer - colorectal No family hx of          Current Outpatient Medications   Medication Sig Dispense Refill     aspirin 81 MG tablet Take 1 tablet by mouth daily.        atenolol (TENORMIN) 25 MG tablet 50 mg in AM and 25 mg in  tablet 0     finasteride (PROSCAR) 5 MG tablet Take 1 tablet (5 mg) by mouth daily 90 tablet 3     hydrochlorothiazide (HYDRODIURIL) 25 MG tablet Take 1 tablet (25 mg) by mouth daily 90 tablet 3     Multiple Vitamins-Minerals (MULTI FOR HIM PO) Take  by mouth daily.       potassium chloride ER (K-DUR/KLOR-CON M) 10 MEQ CR tablet Take 1 tablet (10 mEq) by mouth daily 90 tablet 3     ranitidine (ZANTAC) 150 MG tablet Take 1 tablet (150 mg) by mouth 2 times daily 180 tablet 3     simvastatin (ZOCOR) 10 MG tablet Take 1 tablet (10 mg) by mouth At Bedtime at bedtime. 90 tablet 3     tiZANidine (ZANAFLEX) 4 MG tablet Take 1 tablet (4 mg) by mouth 3 times daily as needed for muscle spasms 30 tablet 1     Calcium Carbonate-Vitamin D (CALCIUM 600 + D OR) Take  by mouth daily.       No Known Allergies  Recent Labs   Lab Test 10/22/18  0912 03/20/18  1229 09/26/17  1110  10/06/16  0843  04/17/15  0822 10/23/14  0924   A1C  --   --   --   --   --   --  5.8  --    LDL 67  --  52  --  71   < >  --  62   HDL 60  --  66  --  57   < >  --  56   TRIG 60  --  60  --  55   < >  --  56   ALT 26  --   --   --  29  --   --  25   CR 0.86 0.80 0.82   < > 0.83   < > 0.80  --    GFRESTIMATED 88 >90 >90   < > >90  Non  GFR Calc     < > >90  Non  GFR Calc    --    GFRESTBLACK >90 >90 >90   < > >90  African American GFR Calc     < > >90   GFR Calc    --    POTASSIUM 3.9 4.2 4.1   < > 4.2   < > 3.8  --     < > = values in this interval not displayed.      BP Readings from Last 3 Encounters:   04/08/19 152/80   10/22/18 136/86   05/07/18 160/82    Wt Readings from Last 3 Encounters:   04/08/19 80.7 kg (178 lb)   10/22/18 81 kg (178 lb 9.6 oz)   03/20/18 81.6 kg (180 lb)                  Labs reviewed in EPIC    ROS:  CONSTITUTIONAL: NEGATIVE for fever, chills, change in weight  ENT/MOUTH: NEGATIVE for ear, mouth and throat  problems  RESP: NEGATIVE for significant cough or SOB  CV: NEGATIVE for chest pain, palpitations or peripheral edema  GI: NEGATIVE for nausea, abdominal pain, heartburn, or change in bowel habits  MUSCULOSKELETAL: NEGATIVE for significant arthralgias or myalgia  PSYCHIATRIC: NEGATIVE for changes in mood or affect  ROS otherwise negative    OBJECTIVE:     /80   Pulse 64   Temp 97  F (36.1  C) (Oral)   Resp 16   Ht 1.829 m (6')   Wt 80.7 kg (178 lb)   SpO2 100%   BMI 24.14 kg/m    Body mass index is 24.14 kg/m .  GENERAL: healthy, alert and no distress  NECK: no adenopathy, no asymmetry, masses, or scars and thyroid normal to palpation  RESP: lungs clear to auscultation - no rales, rhonchi or wheezes  CV: regular rate and rhythm, normal S1 S2, no S3 or S4, no murmur, click or rub, no peripheral edema and peripheral pulses strong  ABDOMEN: soft, nontender, no hepatosplenomegaly, no masses and bowel sounds normal  MS: no gross musculoskeletal defects noted, no edema    Diagnostic Test Results:  Pending     ASSESSMENT/PLAN:       1. Hypertension goal BP (blood pressure) < 140/90  Advised increase Atenolol as discussed   Follow up ancillary BP check 2 weeks  - atenolol (TENORMIN) 25 MG tablet; 50 mg in AM and 25 mg in PM  Dispense: 135 tablet; Refill: 0  - hydrochlorothiazide (HYDRODIURIL) 25 MG tablet; Take 1 tablet (25 mg) by mouth daily  Dispense: 90 tablet; Refill: 3  - potassium chloride ER (K-DUR/KLOR-CON M) 10 MEQ CR tablet; Take 1 tablet (10 mEq) by mouth daily  Dispense: 90 tablet; Refill: 3  - Comprehensive metabolic panel    2. Hyperlipidemia LDL goal <130    - simvastatin (ZOCOR) 10 MG tablet; Take 1 tablet (10 mg) by mouth At Bedtime at bedtime.  Dispense: 90 tablet; Refill: 3  - Lipid panel reflex to direct LDL Fasting  - Comprehensive metabolic panel    3. Right low back pain, unspecified chronicity, with sciatica presence unspecified  Refilled  Stable   - tiZANidine (ZANAFLEX) 4 MG tablet;  Take 1 tablet (4 mg) by mouth 3 times daily as needed for muscle spasms  Dispense: 30 tablet; Refill: 1    4. Hypertrophy of prostate with urinary obstruction  Pt wants a PSA  - PSA, screen    Follow up ancillary BP check    Bita Deluca MD  [facility]

## 2019-04-22 ENCOUNTER — ALLIED HEALTH/NURSE VISIT (OUTPATIENT)
Dept: NURSING | Facility: CLINIC | Age: 69
End: 2019-04-22
Payer: COMMERCIAL

## 2019-04-22 VITALS — SYSTOLIC BLOOD PRESSURE: 128 MMHG | OXYGEN SATURATION: 99 % | DIASTOLIC BLOOD PRESSURE: 78 MMHG | HEART RATE: 57 BPM

## 2019-04-22 DIAGNOSIS — Z01.30 BP CHECK: Primary | ICD-10-CM

## 2019-04-22 NOTE — NURSING NOTE
Steve José is a 68 year old patient who comes in today for a Blood Pressure check.  Initial BP:  /78   Pulse 57   SpO2 99%      57  Disposition: results routed to provider  Mireille FAYE CMA ([facility])

## 2019-04-30 ENCOUNTER — OFFICE VISIT (OUTPATIENT)
Dept: UROLOGY | Facility: CLINIC | Age: 69
End: 2019-04-30
Payer: COMMERCIAL

## 2019-04-30 VITALS — SYSTOLIC BLOOD PRESSURE: 132 MMHG | DIASTOLIC BLOOD PRESSURE: 80 MMHG

## 2019-04-30 DIAGNOSIS — R97.20 ELEVATED PROSTATE SPECIFIC ANTIGEN (PSA): Primary | ICD-10-CM

## 2019-04-30 DIAGNOSIS — N13.8 HYPERTROPHY OF PROSTATE WITH URINARY OBSTRUCTION: ICD-10-CM

## 2019-04-30 DIAGNOSIS — N40.1 HYPERTROPHY OF PROSTATE WITH URINARY OBSTRUCTION: ICD-10-CM

## 2019-04-30 PROCEDURE — 51798 US URINE CAPACITY MEASURE: CPT | Performed by: UROLOGY

## 2019-04-30 PROCEDURE — 99213 OFFICE O/P EST LOW 20 MIN: CPT | Mod: 25 | Performed by: UROLOGY

## 2019-04-30 RX ORDER — TAMSULOSIN HYDROCHLORIDE 0.4 MG/1
0.4 CAPSULE ORAL AT BEDTIME
Qty: 30 CAPSULE | Refills: 1 | Status: SHIPPED | OUTPATIENT
Start: 2019-04-30 | End: 2019-06-21

## 2019-04-30 RX ORDER — FINASTERIDE 5 MG/1
5 TABLET, FILM COATED ORAL DAILY
Qty: 90 TABLET | Refills: 3 | Status: SHIPPED | OUTPATIENT
Start: 2019-04-30 | End: 2020-04-21

## 2019-04-30 NOTE — PROGRESS NOTES
Chief Complaint   Patient presents with     RECHECK       Steve RELL José is a 68 year old male who presents today for follow up of   Chief Complaint   Patient presents with     RECHECK    f/u benign prostatic hyperplasia/elevated psa.  He is s/p biopsy in 2017.  Prostate size was 70 cc.  He is on proscar.  He is doing well without any complaints.    Current Outpatient Medications   Medication Sig Dispense Refill     aspirin 81 MG tablet Take 1 tablet by mouth daily.       atenolol (TENORMIN) 25 MG tablet 50 mg in AM and 25 mg in  tablet 0     Calcium Carbonate-Vitamin D (CALCIUM 600 + D OR) Take  by mouth daily.       finasteride (PROSCAR) 5 MG tablet Take 1 tablet (5 mg) by mouth daily 90 tablet 3     hydrochlorothiazide (HYDRODIURIL) 25 MG tablet Take 1 tablet (25 mg) by mouth daily 90 tablet 3     Multiple Vitamins-Minerals (MULTI FOR HIM PO) Take  by mouth daily.       potassium chloride ER (K-DUR/KLOR-CON M) 10 MEQ CR tablet Take 1 tablet (10 mEq) by mouth daily 90 tablet 3     ranitidine (ZANTAC) 150 MG tablet Take 1 tablet (150 mg) by mouth 2 times daily 180 tablet 3     simvastatin (ZOCOR) 10 MG tablet Take 1 tablet (10 mg) by mouth At Bedtime at bedtime. 90 tablet 3     tiZANidine (ZANAFLEX) 4 MG tablet Take 1 tablet (4 mg) by mouth 3 times daily as needed for muscle spasms 30 tablet 1     No Known Allergies   Past Medical History:   Diagnosis Date     BPH (benign prostatic hypertrophy)      Chronic low back pain 2001     Colon polyp 2000     Hyperlipidemia      Hypertension      Prostatitis     recurrent     Past Surgical History:   Procedure Laterality Date     ABDOMEN SURGERY  1978    Right ureter reimplantation     APPENDECTOMY       BIOPSY  five times    Prostate     C GENITAL SURG PROC,MALE UNLISTED  2001    left hydrocele     C GENITAL SURG PROC,MALE UNLISTED  1977    right ureteral reimplantation     COLONOSCOPY  1/6/2018     HRW CORNEAL TRANSPLANT, CRNA       LASIK  2005     TURP  1998    x  2     Family History   Problem Relation Age of Onset     C.A.D. Father 64        d. MI     Hypertension Father      Hypertension Mother      Cancer Mother         skin     Diabetes No family hx of      Cancer - colorectal No family hx of      Social History     Social History     Marital status:      Spouse name: Amy      Number of children: 0     Years of education: 15     Occupational History     printing company sales Retired     Social History Main Topics     Smoking status: Never Smoker     Smokeless tobacco: Never Used     Alcohol use 0.0 oz/week      Comment: rare     Drug use: No     Sexual activity: Yes     Partners: Female     Other Topics Concern      Service No     Blood Transfusions No     unsure     Caffeine Concern No     Occupational Exposure No     Hobby Hazards No     Sleep Concern No     Stress Concern No     Weight Concern No     Special Diet No     watches intake of salt and sugar     Back Care No     Exercise Yes     everyday goes for mile and a half to two mile walk     Bike Helmet No     Seat Belt Yes     Self-Exams Yes     Social History Narrative       REVIEW OF SYSTEMS  =================  C: NEGATIVE for fever, chills, change in weight  I: NEGATIVE for worrisome rashes, moles or lesions  E/M: NEGATIVE for ear, mouth and throat problems  R: NEGATIVE for significant cough or SHORTNESS OF BREATH,   CV: NEGATIVE for chest pain, palpitations or peripheral edema  GI: NEGATIVE for nausea, abdominal pain, heartburn, or change in bowel habits  NEURO: NEGATIVE any motor/sensory changes  PSYCH: NEGATIVE for recent mood disorder    Physical Exam:  /80    Patient is pleasant, in no acute distress, good general condition.  Lung: no evidence of respiratory distress    Abdomen: Soft, nondistended, non tender. No masses. No rebound or guarding.   Exam: no cva tenderness.  Penis no discharge. Testis no masses.  No scrotal skin lesion.  Prostate 70 gm smooth no nodule.  Skin: Warm  and dry.  No redness.  Psych: normal mood and affect  Neuro: alert and oriented    Assessment/Plan:   (R97.20) Elevated prostate specific antigen (PSA)  (primary encounter diagnosis)  Comment:    Plan: PSA tumor marker in one year    BPH: bladder scan < 50 ml                     Cont with proscar            Add flomax - side effects discussed

## 2019-05-23 DIAGNOSIS — I10 HYPERTENSION GOAL BP (BLOOD PRESSURE) < 140/90: ICD-10-CM

## 2019-05-24 RX ORDER — ATENOLOL 25 MG/1
TABLET ORAL
Qty: 270 TABLET | Refills: 1 | Status: SHIPPED | OUTPATIENT
Start: 2019-05-24 | End: 2019-07-31

## 2019-06-20 DIAGNOSIS — N40.1 HYPERTROPHY OF PROSTATE WITH URINARY OBSTRUCTION: ICD-10-CM

## 2019-06-20 DIAGNOSIS — N13.8 HYPERTROPHY OF PROSTATE WITH URINARY OBSTRUCTION: ICD-10-CM

## 2019-06-20 NOTE — TELEPHONE ENCOUNTER
Requested Prescriptions   Pending Prescriptions Disp Refills     tamsulosin (FLOMAX) 0.4 MG capsule  Last Written Prescription Date:  4/30/19  Last Fill Quantity: 30,  # refills: 1   Last office visit: 4/30/2019 with prescribing provider:  Ashleigh   Future Office Visit:     30 capsule 1     Sig: Take 1 capsule (0.4 mg) by mouth At Bedtime       There is no refill protocol information for this order

## 2019-06-21 RX ORDER — TAMSULOSIN HYDROCHLORIDE 0.4 MG/1
0.4 CAPSULE ORAL AT BEDTIME
Qty: 30 CAPSULE | Refills: 1 | Status: SHIPPED | OUTPATIENT
Start: 2019-06-21 | End: 2019-07-15

## 2019-07-11 DIAGNOSIS — N40.1 HYPERTROPHY OF PROSTATE WITH URINARY OBSTRUCTION: ICD-10-CM

## 2019-07-11 DIAGNOSIS — N13.8 HYPERTROPHY OF PROSTATE WITH URINARY OBSTRUCTION: ICD-10-CM

## 2019-07-12 ENCOUNTER — TELEPHONE (OUTPATIENT)
Dept: FAMILY MEDICINE | Facility: CLINIC | Age: 69
End: 2019-07-12

## 2019-07-12 NOTE — TELEPHONE ENCOUNTER
Panel Management Review      Patient has the following on his problem list:     Hypertension   Last three blood pressure readings:  BP Readings from Last 3 Encounters:   04/30/19 132/80   04/22/19 128/78   04/08/19 152/80     Blood pressure: FAILED    HTN Guidelines:  Less than 140/90      Composite cancer screening  Chart review shows that this patient is due/due soon for the following None  Summary:    Patient is due/failing the following:   BP CHECK and PHYSICAL    Action needed:   Sent my chart message.    Type of outreach:    Sent [employer] message.    Questions for provider review:    None                                                                                                                                    Linsey Vera MA       Chart routed to none .

## 2019-07-12 NOTE — LETTER
July 12, 2019          Steve José,  [address] [place] [address]        Dear Stevemaggie José      Monitoring and managing your preventative and chronic health conditions are very important to us. Our records indicate that you have not scheduled for Wellness exam and blood pressure check  which was recommended by Dr. Deluca      If you have received your health care elsewhere, please call the clinic so the information can be documented in your chart.    Please call [phone] or message us through your [employer] account to schedule an appointment or provide information for your chart.     Feel free to contact us if you have any questions or concerns!    I look forward to seeing you and working with you on your health care needs.     Sincerely,       Your [facility] Team/sg

## 2019-07-15 RX ORDER — TAMSULOSIN HYDROCHLORIDE 0.4 MG/1
0.4 CAPSULE ORAL AT BEDTIME
Qty: 90 CAPSULE | Refills: 1 | Status: SHIPPED | OUTPATIENT
Start: 2019-07-15 | End: 2019-07-31

## 2019-07-31 ENCOUNTER — OFFICE VISIT (OUTPATIENT)
Dept: FAMILY MEDICINE | Facility: CLINIC | Age: 69
End: 2019-07-31
Payer: COMMERCIAL

## 2019-07-31 VITALS
SYSTOLIC BLOOD PRESSURE: 150 MMHG | HEIGHT: 72 IN | DIASTOLIC BLOOD PRESSURE: 80 MMHG | TEMPERATURE: 98.3 F | BODY MASS INDEX: 24.65 KG/M2 | HEART RATE: 60 BPM | WEIGHT: 182 LBS | RESPIRATION RATE: 16 BRPM | OXYGEN SATURATION: 100 %

## 2019-07-31 DIAGNOSIS — E78.5 HYPERLIPIDEMIA LDL GOAL <130: ICD-10-CM

## 2019-07-31 DIAGNOSIS — N13.8 HYPERTROPHY OF PROSTATE WITH URINARY OBSTRUCTION: ICD-10-CM

## 2019-07-31 DIAGNOSIS — Z00.00 ROUTINE HISTORY AND PHYSICAL EXAMINATION OF ADULT: ICD-10-CM

## 2019-07-31 DIAGNOSIS — N40.1 HYPERTROPHY OF PROSTATE WITH URINARY OBSTRUCTION: ICD-10-CM

## 2019-07-31 DIAGNOSIS — Z71.89 ADVANCED DIRECTIVES, COUNSELING/DISCUSSION: ICD-10-CM

## 2019-07-31 DIAGNOSIS — Z00.00 ENCOUNTER FOR MEDICARE ANNUAL WELLNESS EXAM: ICD-10-CM

## 2019-07-31 DIAGNOSIS — I10 HYPERTENSION GOAL BP (BLOOD PRESSURE) < 140/90: ICD-10-CM

## 2019-07-31 DIAGNOSIS — K21.9 GASTROESOPHAGEAL REFLUX DISEASE WITHOUT ESOPHAGITIS: ICD-10-CM

## 2019-07-31 PROCEDURE — G0438 PPPS, INITIAL VISIT: HCPCS | Performed by: FAMILY MEDICINE

## 2019-07-31 RX ORDER — TAMSULOSIN HYDROCHLORIDE 0.4 MG/1
0.4 CAPSULE ORAL AT BEDTIME
Qty: 90 CAPSULE | Refills: 1 | Status: SHIPPED | OUTPATIENT
Start: 2019-07-31 | End: 2020-04-28

## 2019-07-31 RX ORDER — ATENOLOL 25 MG/1
TABLET ORAL
Qty: 270 TABLET | Refills: 1 | Status: SHIPPED | OUTPATIENT
Start: 2019-07-31 | End: 2020-04-07

## 2019-07-31 RX ORDER — AMLODIPINE BESYLATE 2.5 MG/1
2.5 TABLET ORAL DAILY
Qty: 30 TABLET | Refills: 1 | Status: SHIPPED | OUTPATIENT
Start: 2019-07-31 | End: 2019-09-22

## 2019-07-31 ASSESSMENT — ENCOUNTER SYMPTOMS
EYE PAIN: 0
NAUSEA: 0
FEVER: 0
PARESTHESIAS: 0
SHORTNESS OF BREATH: 0
HEMATURIA: 0
MYALGIAS: 0
DIZZINESS: 0
JOINT SWELLING: 0
NERVOUS/ANXIOUS: 0
CONSTIPATION: 0
DYSURIA: 0
ABDOMINAL PAIN: 0
DIARRHEA: 0
PALPITATIONS: 0
ARTHRALGIAS: 0
COUGH: 0
WEAKNESS: 0
HEADACHES: 0
FREQUENCY: 0
CHILLS: 0
HEMATOCHEZIA: 0
HEARTBURN: 0
SORE THROAT: 0

## 2019-07-31 ASSESSMENT — MIFFLIN-ST. JEOR: SCORE: 1618.55

## 2019-07-31 ASSESSMENT — ACTIVITIES OF DAILY LIVING (ADL): CURRENT_FUNCTION: NO ASSISTANCE NEEDED

## 2019-07-31 NOTE — PROGRESS NOTES
"    The patient was counseled and encouraged to consider modifying their diet and eating habits. He was provided with information on recommended healthy diet options.  Answers for HPI/ROS submitted by the patient on 7/31/2019   Annual Exam:  In general, how would you rate your overall physical health?: good  Frequency of exercise:: 6-7 days/week  Do you usually eat at least 4 servings of fruit and vegetables a day, include whole grains & fiber, and avoid regularly eating high fat or \"junk\" foods? : No  Taking medications regularly:: Yes  Medication side effects:: Other  Activities of Daily Living: no assistance needed  Home safety: no safety concerns identified  Hearing Impairment:: no hearing concerns  In the past 6 months, have you been bothered by leaking of urine?: No  abdominal pain: No  Blood in stool: No  Blood in urine: No  chest pain: No  chills: No  congestion: No  constipation: No  cough: No  diarrhea: No  dizziness: No  ear pain: No  eye pain: No  nervous/anxious: No  fever: No  frequency: No  genital sores: No  headaches: No  hearing loss: No  heartburn: No  arthralgias: No  joint swelling: No  peripheral edema: No  mood changes: No  myalgias: No  nausea: No  dysuria: No  palpitations: No  Skin sensation changes: No  sore throat: No  urgency: No  rash: No  shortness of breath: No  visual disturbance: No  weakness: No  impotence: No  penile discharge: No  In general, how would you rate your overall mental or emotional health?: good  Additional concerns today:: No  Duration of exercise:: 30-45 minutes      "

## 2019-07-31 NOTE — PROGRESS NOTES
"SUBJECTIVE:   Steve José is a 69 year old male who presents for Preventive Visit.  Are you in the first 12 months of your Medicare coverage?  No    Healthy Habits:     In general, how would you rate your overall health?  Good    Frequency of exercise:  6-7 days/week    Duration of exercise:  30-45 minutes    Do you usually eat at least 4 servings of fruit and vegetables a day, include whole grains    & fiber and avoid regularly eating high fat or \"junk\" foods?  No    Taking medications regularly:  Yes    Medication side effects:  Other    Ability to successfully perform activities of daily living:  No assistance needed    Home Safety:  No safety concerns identified    Hearing Impairment:  No hearing concerns    In the past 6 months, have you been bothered by leaking of urine?  No    In general, how would you rate your overall mental or emotional health?  Good      PHQ-2 Total Score: 0    Additional concerns today:  No    Do you feel safe in your environment? Yes    Do you have a Health Care Directive? No: Advance care planning reviewed with patient; information given to patient to review.      Fall risk  Fallen 2 or more times in the past year?: No  Any fall with injury in the past year?: No    Cognitive Screening   1) Repeat 3 items (Leader, Season, Table)    2) Clock draw: NORMAL  3) 3 item recall: Recalls 3 objects  Results: 3 items recalled: COGNITIVE IMPAIRMENT LESS LIKELY    Mini-CogTM Copyright RELL Mayes. Licensed by the author for use in [facility]; reprinted with permission (anirudh@.[email or web address]). All rights reserved.      Do you have sleep apnea, excessive snoring or daytime drowsiness?: no    Reviewed and updated as needed this visit by clinical staff  Tobacco  Allergies  Meds  Med Hx  Surg Hx  Fam Hx  Soc Hx        Reviewed and updated as needed this visit by Provider        Social History     Tobacco Use     Smoking status: Never Smoker     Smokeless tobacco: Never Used   Substance Use " Topics     Alcohol use: Yes     Alcohol/week: 0.0 oz     Comment: rare     Hypertension Follow-up      Do you check your blood pressure regularly outside of the clinic? Yes     Are you following a low salt diet? Yes    Are your blood pressures ever more than 140 on the top number (systolic) OR more   than 90 on the bottom number (diastolic), for example 140/90? Yes    Amount of exercise or physical activity: walks dog    Problems taking medications regularly: No    Medication side effects: none    Diet: low salt and low fat/cholesterol          Alcohol Use 7/31/2019   Prescreen: >3 drinks/day or >7 drinks/week? Not Applicable   Prescreen: >3 drinks/day or >7 drinks/week? -       Current providers sharing in care for this patient include:   Patient Care Team:  Bita Deluca MD as PCP - General  Bita Deluca MD as Assigned PCP    The following health maintenance items are reviewed in Epic and correct as of today:  Health Maintenance   Topic Date Due     AORTIC ANEURYSM SCREENING (SYSTEM ASSIGNED)  05/09/2013     MEDICARE ANNUAL WELLNESS VISIT  03/29/2018     PHQ-2  01/01/2019     INFLUENZA VACCINE (1) 09/01/2019     FALL RISK ASSESSMENT  10/22/2019     BMP  04/08/2020     LIPID  04/08/2020     ADVANCE CARE PLANNING  03/29/2022     COLONOSCOPY  12/13/2027     DTAP/TDAP/TD IMMUNIZATION (3 - Td) 03/20/2028     HEPATITIS C SCREENING  Completed     PNEUMOCOCCAL IMMUNIZATION 65+ LOW/MEDIUM RISK  Completed     ZOSTER IMMUNIZATION  Completed     IPV IMMUNIZATION  Aged Out     MENINGITIS IMMUNIZATION  Aged Out     Lab work is in process  Labs reviewed in EPIC  BP Readings from Last 3 Encounters:   07/31/19 (!) 150/80   04/30/19 132/80   04/22/19 128/78    Wt Readings from Last 3 Encounters:   07/31/19 82.6 kg (182 lb)   04/08/19 80.7 kg (178 lb)   10/22/18 81 kg (178 lb 9.6 oz)                  Patient Active Problem List   Diagnosis     GERD (gastroesophageal reflux disease)     Hyperlipidemia LDL goal <130     Hypertension  goal BP (blood pressure) < 140/90     Advanced directives, counseling/discussion     Hypertrophy of prostate with urinary obstruction     Past Surgical History:   Procedure Laterality Date     ABDOMEN SURGERY  1978    Right ureter reimplantation     APPENDECTOMY       BIOPSY  five times    Prostate     C GENITAL SURG PROC,MALE UNLISTED  2001    left hydrocele     C GENITAL SURG PROC,MALE UNLISTED  1977    right ureteral reimplantation     COLONOSCOPY  1/6/2018     HRW CORNEAL TRANSPLANT, CRNA       LASIK  2005     TURP  1998    x 2       Social History     Tobacco Use     Smoking status: Never Smoker     Smokeless tobacco: Never Used   Substance Use Topics     Alcohol use: Yes     Alcohol/week: 0.0 oz     Comment: rare     Family History   Problem Relation Age of Onset     C.A.D. Father 64        d. MI     Hypertension Father      Hypertension Mother      Cancer Mother         skin     Diabetes No family hx of      Cancer - colorectal No family hx of          Current Outpatient Medications   Medication Sig Dispense Refill     aspirin 81 MG tablet Take 1 tablet by mouth daily.       atenolol (TENORMIN) 25 MG tablet TAKE 2 TABLETS BY MOUTH IN THE MORNING AND 1 ONCE DAILY IN THE EVENING 270 tablet 1     finasteride (PROSCAR) 5 MG tablet Take 1 tablet (5 mg) by mouth daily 90 tablet 3     hydrochlorothiazide (HYDRODIURIL) 25 MG tablet Take 1 tablet (25 mg) by mouth daily 90 tablet 3     Multiple Vitamins-Minerals (MULTI FOR HIM PO) Take  by mouth daily.       potassium chloride ER (K-DUR/KLOR-CON M) 10 MEQ CR tablet Take 1 tablet (10 mEq) by mouth daily 90 tablet 3     ranitidine (ZANTAC) 150 MG tablet Take 1 tablet (150 mg) by mouth 2 times daily 180 tablet 3     simvastatin (ZOCOR) 10 MG tablet Take 1 tablet (10 mg) by mouth At Bedtime at bedtime. 90 tablet 3     tamsulosin (FLOMAX) 0.4 MG capsule Take 1 capsule (0.4 mg) by mouth At Bedtime 90 capsule 1     tiZANidine (ZANAFLEX) 4 MG tablet Take 1 tablet (4 mg) by  mouth 3 times daily as needed for muscle spasms 30 tablet 1     Calcium Carbonate-Vitamin D (CALCIUM 600 + D OR) Take  by mouth daily.       No Known Allergies  Recent Labs   Lab Test 04/08/19  0927 10/22/18  0912  09/26/17  1110  10/06/16  0843  04/17/15  0822   A1C  --   --   --   --   --   --   --  5.8   LDL 55 67  --  52  --  71   < >  --    HDL 66 60  --  66  --  57   < >  --    TRIG 62 60  --  60  --  55   < >  --    ALT 23 26  --   --   --  29  --   --    CR 0.90 0.86   < > 0.82   < > 0.83   < > 0.80   GFRESTIMATED 86 88   < > >90   < > >90  Non  GFR Calc     < > >90  Non  GFR Calc     GFRESTBLACK >90 >90   < > >90   < > >90  African American GFR Calc     < > >90   GFR Calc     POTASSIUM 4.0 3.9   < > 4.1   < > 4.2   < > 3.8    < > = values in this interval not displayed.          Review of Systems   Constitutional: Negative for chills and fever.   HENT: Negative for congestion, ear pain, hearing loss and sore throat.    Eyes: Negative for pain and visual disturbance.   Respiratory: Negative for cough and shortness of breath.    Cardiovascular: Negative for chest pain, palpitations and peripheral edema.   Gastrointestinal: Negative for abdominal pain, constipation, diarrhea, heartburn, hematochezia and nausea.   Genitourinary: Negative for discharge, dysuria, frequency, genital sores, hematuria, impotence and urgency.   Musculoskeletal: Negative for arthralgias, joint swelling and myalgias.   Skin: Negative for rash.   Neurological: Negative for dizziness, weakness, headaches and paresthesias.   Psychiatric/Behavioral: Negative for mood changes. The patient is not nervous/anxious.      CONSTITUTIONAL: NEGATIVE for fever, chills, change in weight  INTEGUMENTARY/SKIN: NEGATIVE for worrisome rashes, moles or lesions  EYES: NEGATIVE for vision changes or irritation  ENT/MOUTH: NEGATIVE for ear, mouth and throat problems  RESP: NEGATIVE for significant cough or  SOB  BREAST: NEGATIVE for masses, tenderness or discharge  CV: NEGATIVE for chest pain, palpitations or peripheral edema  GI: NEGATIVE for nausea, abdominal pain, heartburn, or change in bowel habits  : NEGATIVE for frequency, dysuria, or hematuria  MUSCULOSKELETAL: NEGATIVE for significant arthralgias or myalgia  NEURO: NEGATIVE for weakness, dizziness or paresthesias  ENDOCRINE: NEGATIVE for temperature intolerance, skin/hair changes  HEME: NEGATIVE for bleeding problems  PSYCHIATRIC: NEGATIVE for changes in mood or affect    OBJECTIVE:   Pulse 60   Temp 98.3  F (36.8  C) (Oral)   Resp 16   Ht 1.829 m (6')   Wt 82.6 kg (182 lb)   SpO2 100%   BMI 24.68 kg/m   Estimated body mass index is 24.68 kg/m  as calculated from the following:    Height as of this encounter: 1.829 m (6').    Weight as of this encounter: 82.6 kg (182 lb).  Physical Exam  GENERAL: healthy, alert and no distress  EYES: Eyes grossly normal to inspection, PERRL and conjunctivae and sclerae normal  HENT: ear canals and TM's normal, nose and mouth without ulcers or lesions  NECK: no adenopathy, no asymmetry, masses, or scars and thyroid normal to palpation  RESP: lungs clear to auscultation - no rales, rhonchi or wheezes  CV: regular rate and rhythm, normal S1 S2, no S3 or S4, no murmur, click or rub, no peripheral edema and peripheral pulses strong  ABDOMEN: soft, nontender, no hepatosplenomegaly, no masses and bowel sounds normal  MS: no gross musculoskeletal defects noted, no edema  SKIN: no suspicious lesions or rashes  NEURO: Normal strength and tone, mentation intact and speech normal  PSYCH: mentation appears normal, affect normal/bright    Diagnostic Test Results:  Labs reviewed in Epic    ASSESSMENT / PLAN:   1. Routine history and physical examination of adult      2. Hypertrophy of prostate with urinary obstruction  refilled  - tamsulosin (FLOMAX) 0.4 MG capsule; Take 1 capsule (0.4 mg) by mouth At Bedtime  Dispense: 90 capsule;  Refill: 1    3. Hypertension goal BP (blood pressure) < 140/90  Advised add amlodipine  SEE [facility] care orders    The potential side effects of this medication have been discussed with the patient.  Call if any significant problems with these are experienced.    - amLODIPine (NORVASC) 2.5 MG tablet; Take 1 tablet (2.5 mg) by mouth daily  Dispense: 30 tablet; Refill: 1  - atenolol (TENORMIN) 25 MG tablet; TAKE 2 TABLETS BY MOUTH IN THE MORNING AND 1 ONCE DAILY IN THE EVENING  Dispense: 270 tablet; Refill: 1  - RN HTN MGMT    4. Hyperlipidemia LDL goal <130  At Goal    5. Gastroesophageal reflux disease without esophagitis  Stable     6. Advanced directives, counseling/discussion  Advised     7. Encounter for Medicare annual wellness exam        End of Life Planning:  Patient currently has an advanced directive: No.  I have verified the patient's ablity to prepare an advanced directive/make health care decisions.  Literature was provided to assist patient in preparing an advanced directive.    COUNSELING:  Reviewed preventive health counseling, as reflected in patient instructions       Regular exercise       Healthy diet/nutrition       Vision screening       Hearing screening       Dental care       Bladder control       Fall risk prevention       Colon cancer screening       Prostate cancer screening       The 10-year ASCVD risk score (Daronjovana DAVIS Jr., et al., 2013) is: 21.4%    Values used to calculate the score:      Age: 69 years      Sex: Male      Is Non- : No      Diabetic: No      Tobacco smoker: No      Systolic Blood Pressure: 150 mmHg      Is BP treated: Yes      HDL Cholesterol: 66 mg/dL      Total Cholesterol: 133 mg/dL    Estimated body mass index is 24.68 kg/m  as calculated from the following:    Height as of this encounter: 1.829 m (6').    Weight as of this encounter: 82.6 kg (182 lb).         reports that he has never smoked. He has never used smokeless tobacco.      Appropriate  preventive services were discussed with this patient, including applicable screening as appropriate for cardiovascular disease, diabetes, osteopenia/osteoporosis, and glaucoma.  As appropriate for age/gender, discussed screening for colorectal cancer, prostate cancer, breast cancer, and cervical cancer. Checklist reviewing preventive services available has been given to the patient.    Reviewed patients plan of care and provided an AVS. The Intermediate Care Plan ( asthma action plan, low back pain action plan, and migraine action plan) for Steve meets the Care Plan requirement. This Care Plan has been established and reviewed with the Patient.    Counseling Resources:  ATP IV Guidelines  Pooled Cohorts Equation Calculator  Breast Cancer Risk Calculator  FRAX Risk Assessment  ICSI Preventive Guidelines  Dietary Guidelines for Americans, 2010  USDA's MyPlate  ASA Prophylaxis  Lung CA Screening    Bita Deluca MD  [facility]    Identified Health Risks:

## 2019-07-31 NOTE — PATIENT INSTRUCTIONS
Patient Education   Personalized Prevention Plan  You are due for the preventive services outlined below.  Your care team is available to assist you in scheduling these services.  If you have already completed any of these items, please share that information with your care team to update in your medical record.  Health Maintenance Due   Topic Date Due     AORTIC ANEURYSM SCREENING (SYSTEM ASSIGNED)  05/09/2013     Annual Wellness Visit  03/29/2018     PHQ-2  01/01/2019       Understanding [employer] MyPlate  The USDA (U.S. Department of Agriculture) has guidelines to help you make healthy food choices. These are called MyPlate. MyPlate shows the food groups that make up healthy meals using the image of a place setting. Before you eat, think about the healthiest choices for what to put onto your plate or into your cup or bowl. To learn more about building a healthy plate, visit www.EveryRack.gov.    The food groups    Fruits. Any fruit or 100% fruit juice counts as part of the Fruit Group. Fruits may be fresh, canned, frozen, or dried, and may be whole, cut-up, or pureed. Make half your plate fruits and vegetables.    Vegetables. Any vegetable or 100% vegetable juice counts as a member of the Vegetable Group. Vegetables may be fresh, frozen, canned, or dried. They can be served raw or cooked and may be whole, cut-up, or mashed. Make half your plate fruits and vegetables.    Grains. All foods made from grains are part of the Grains Group. These include wheat, rice, oats, cornmeal, and barley such as bread, pasta, oatmeal, cereal, tortillas, and grits. Grains should be no more than a quarter of your plate. At least half of your grains should be whole grains.    Protein. This group includes meat, poultry, seafood, beans and peas, eggs, processed soy products (like tofu), nuts (including nut butters), and seeds. Make protein choices no more than a quarter of your plate. Meat and poultry choices should be lean or low  fat.    Dairy. All fluid milk products and foods made from milk that contain calcium, like yogurt and cheese, are part of the Dairy Group. (Foods that have little calcium, such as cream, butter, and cream cheese, are not part of the group.) Most dairy choices should be low-fat or fat-free.    Oils. These are fats that are liquid at room temperature. They include canola, corn, olive, soybean, and sunflower oil. Foods that are mainly oil include mayonnaise, certain salad dressings, and soft margarines. You should have only 5 to 7 teaspoons of oils a day. You probably already get this much from the food you eat.  Date Last Reviewed: 8/1/2017 2000-2018 [employer]. [address] [place] [address], [place] [address]. All rights reserved. This information is not intended as a substitute for professional medical care. Always follow your healthcare professional's instructions.

## 2019-08-13 ENCOUNTER — ALLIED HEALTH/NURSE VISIT (OUTPATIENT)
Dept: NURSING | Facility: CLINIC | Age: 69
End: 2019-08-13
Payer: COMMERCIAL

## 2019-08-13 VITALS
WEIGHT: 181.2 LBS | DIASTOLIC BLOOD PRESSURE: 78 MMHG | HEART RATE: 62 BPM | BODY MASS INDEX: 24.58 KG/M2 | SYSTOLIC BLOOD PRESSURE: 130 MMHG

## 2019-08-13 DIAGNOSIS — I10 HYPERTENSION GOAL BP (BLOOD PRESSURE) < 140/90: Primary | ICD-10-CM

## 2019-08-13 NOTE — PATIENT INSTRUCTIONS
PATIENT INSTRUCTIONS     1.  You will continue current medication regimen unchanged    2.  FOLLOW UP:   Follow up with Provider - as needed     3.  Limit total daily sodium to 1500-2000mg per day    Your BP Goal is: less than <140/90 consistently    Today we used a regular cuff on your right arm.      BP Readings from Last 3 Encounters:   08/13/19 130/78   07/31/19 (!) 150/80   04/30/19 132/80     Pulse Readings from Last 1 Encounters:   08/13/19 62     You will need baseline lab tests done within 12 months of beginning a new blood pressure medication and during/after medication adjustment is complete.    Last Labs:  Sodium   Date Value Ref Range Status   04/08/2019 141 133 - 144 mmol/L Final      Potassium   Date Value Ref Range Status   04/08/2019 4.0 3.4 - 5.3 mmol/L Final     Urea Nitrogen   Date Value Ref Range Status   04/08/2019 24 7 - 30 mg/dL Final     Creatinine   Date Value Ref Range Status   04/08/2019 0.90 0.66 - 1.25 mg/dL Final     GFR Estimate   Date Value Ref Range Status   04/08/2019 86 >60 mL/min/[1.73_m2] Final     Comment:     Non  GFR Calc  Starting 12/18/2018, serum creatinine based estimated GFR (eGFR) will be   calculated using the Chronic Kidney Disease Epidemiology Collaboration   (CKD-EPI) equation.         Your pharmacist is a great resource for questions regarding your medication's side effects and potential interactions.     If you are having a side effect from your medication, please contact your primary provider.     If you believe you are experiencing a life threatening reaction to your medication call 911 immediately.     Adelaida MORFIN RN

## 2019-08-13 NOTE — PROGRESS NOTES
Steve Michelleman is being followed for Blood Pressure management.    NURSING ASSESSMENT/PLAN:  Blood pressure reading today is at the provider specified goal of <140/90.    Current blood pressure medication(s):  1. Hydrochlorothiazide 25 mg tablet daily  2. Atenolol 25 mg tablet - 2 tablets in AM, 1 tablet in PM  3. Amlodipine 2.5 mg tablet daily  1.  The patient will continue current medication regimen unchanged.     2.  We will not be checking a metabolic lab panel today.    3.  Follow up instructions include:     Next Provider visit: Follow up in 6 months..    SUBJECTIVE:                                                    The patient is taking medication as prescribed and is tolerating well.  Patient is not monitoring Blood Pressure at home.   Last 3 home readings: N/A  In addition notes: Patient thinks stress contributes to elevated BP. Patient reads many health articles and is aware of salt and diet and exercise. Patient does daily exercises and walks at least 1 mile every day.    Out of the following complicating factors: Cough, Headache, Lightheadedness, Shortness of breath, Fatigue, Nausea, Sexual Dysfunction, New onset of swelling or edema, Weakness and New onset of Chest Pain, the patient reports:  Fatigue - patient thinks when he increased the atenolol it made him more sleepy. Patient states he feels like it's getting better. Advised him if it becomes too bothersome or it gets worse, to let us know. Patient agreed.    OBJECTIVE:                                                    Is pulse 55 or greater? - Yes  Pulse Readings from Last 1 Encounters:   07/31/19 60     Today's BP completed using cuff size: regular on right side arm.  BP Readings from Last 3 Encounters:   07/31/19 (!) 150/80   04/30/19 132/80   04/22/19 128/78       Current Outpatient Medications   Medication Sig Dispense Refill     amLODIPine (NORVASC) 2.5 MG tablet Take 1 tablet (2.5 mg) by mouth daily 30 tablet 1     aspirin 81 MG tablet  Take 1 tablet by mouth daily.       atenolol (TENORMIN) 25 MG tablet TAKE 2 TABLETS BY MOUTH IN THE MORNING AND 1 ONCE DAILY IN THE EVENING 270 tablet 1     Calcium Carbonate-Vitamin D (CALCIUM 600 + D OR) Take  by mouth daily.       finasteride (PROSCAR) 5 MG tablet Take 1 tablet (5 mg) by mouth daily 90 tablet 3     hydrochlorothiazide (HYDRODIURIL) 25 MG tablet Take 1 tablet (25 mg) by mouth daily 90 tablet 3     Multiple Vitamins-Minerals (MULTI FOR HIM PO) Take  by mouth daily.       potassium chloride ER (K-DUR/KLOR-CON M) 10 MEQ CR tablet Take 1 tablet (10 mEq) by mouth daily 90 tablet 3     ranitidine (ZANTAC) 150 MG tablet Take 1 tablet (150 mg) by mouth 2 times daily 180 tablet 3     simvastatin (ZOCOR) 10 MG tablet Take 1 tablet (10 mg) by mouth At Bedtime at bedtime. 90 tablet 3     tamsulosin (FLOMAX) 0.4 MG capsule Take 1 capsule (0.4 mg) by mouth At Bedtime 90 capsule 1     tiZANidine (ZANAFLEX) 4 MG tablet Take 1 tablet (4 mg) by mouth 3 times daily as needed for muscle spasms 30 tablet 1     Potassium   Date Value Ref Range Status   04/08/2019 4.0 3.4 - 5.3 mmol/L Final     Creatinine   Date Value Ref Range Status   04/08/2019 0.90 0.66 - 1.25 mg/dL Final     Urea Nitrogen   Date Value Ref Range Status   04/08/2019 24 7 - 30 mg/dL Final     GFR Estimate   Date Value Ref Range Status   04/08/2019 86 >60 mL/min/[1.73_m2] Final     Comment:     Non  GFR Calc  Starting 12/18/2018, serum creatinine based estimated GFR (eGFR) will be   calculated using the Chronic Kidney Disease Epidemiology Collaboration   (CKD-EPI) equation.        A baseline potassium, creatinine, BUN, GFR has been done within past 12 months    Education:  written materials handout and general discussion/verbal explanation  Limit dietary sodium intake between 1500-2000mg every day  Regular aerobic exercise.  Discussed habit change regarding diet/weight loss, disease management/lifestyle changes  and patient activation    These interventions were used: Reflection- simple or complex, Empathy/Understanding, Open ended questions and Change talk  Education material provided and patient was given an opportunity to ask questions.    Patient verbalized understanding of this plan and is agreeable.    Professional Reference click here   Copy of current RN HTN MGMT order or refer to Other Orders:  Add blood pressure goal to problem list:  <140/90    Patient is being referred to RN Hypertension Program for the following diagnoses:  Hypertension    RN will confirm Potassium, Creatinine, BUN (or BMP) have been done within the past 12 months.  RN will order follow-up labs according to RN protocol.    According to RN Protocol, start or titrate:     Amlodipine (Norvasc) starting dose 2.5 mg may be titrated to 10mg. Increase by doubling mg every 2 to 3 weeks.  Usual dose 5 mg to 10 mg orally once daily.  Most common side effect:  fluid retention    If blood pressure not at goal after current medication titrated, the following medication(s) may be prescribed and titrated.    No further medications ordered.    If blood pressure not at goal after maximum dose reached, consult provider.    If blood pressure at goal, follow up: with PCP 6 mos. from last PCP appt.     Contraindications and dosing considerations for current and recommended medications have been reviewed. Yes  Dosage adjustment made based on patient specific, physician directed, care plan.    Adelaida Byrne RN

## 2019-09-22 DIAGNOSIS — I10 HYPERTENSION GOAL BP (BLOOD PRESSURE) < 140/90: ICD-10-CM

## 2019-09-23 RX ORDER — AMLODIPINE BESYLATE 2.5 MG/1
TABLET ORAL
Qty: 90 TABLET | Refills: 1 | Status: SHIPPED | OUTPATIENT
Start: 2019-09-23 | End: 2020-03-13

## 2019-11-09 ENCOUNTER — HEALTH MAINTENANCE LETTER (OUTPATIENT)
Age: 69
End: 2019-11-09

## 2020-03-10 DIAGNOSIS — I10 HYPERTENSION GOAL BP (BLOOD PRESSURE) < 140/90: ICD-10-CM

## 2020-03-10 DIAGNOSIS — E78.5 HYPERLIPIDEMIA LDL GOAL <130: ICD-10-CM

## 2020-03-13 RX ORDER — HYDROCHLOROTHIAZIDE 25 MG/1
TABLET ORAL
Qty: 90 TABLET | Refills: 0 | Status: SHIPPED | OUTPATIENT
Start: 2020-03-13 | End: 2020-04-07

## 2020-03-13 RX ORDER — AMLODIPINE BESYLATE 2.5 MG/1
TABLET ORAL
Qty: 90 TABLET | Refills: 0 | Status: SHIPPED | OUTPATIENT
Start: 2020-03-13 | End: 2020-04-07

## 2020-03-13 RX ORDER — SIMVASTATIN 10 MG
TABLET ORAL
Qty: 90 TABLET | Refills: 0 | Status: SHIPPED | OUTPATIENT
Start: 2020-03-13 | End: 2020-04-07

## 2020-04-07 ENCOUNTER — VIRTUAL VISIT (OUTPATIENT)
Dept: FAMILY MEDICINE | Facility: CLINIC | Age: 70
End: 2020-04-07
Payer: COMMERCIAL

## 2020-04-07 DIAGNOSIS — I10 HYPERTENSION GOAL BP (BLOOD PRESSURE) < 140/90: ICD-10-CM

## 2020-04-07 DIAGNOSIS — E78.5 HYPERLIPIDEMIA LDL GOAL <130: ICD-10-CM

## 2020-04-07 PROCEDURE — 99214 OFFICE O/P EST MOD 30 MIN: CPT | Mod: TEL | Performed by: FAMILY MEDICINE

## 2020-04-07 RX ORDER — HYDROCHLOROTHIAZIDE 25 MG/1
25 TABLET ORAL DAILY
Qty: 90 TABLET | Refills: 0 | Status: SHIPPED | OUTPATIENT
Start: 2020-04-07 | End: 2020-10-12

## 2020-04-07 RX ORDER — ATENOLOL 25 MG/1
TABLET ORAL
Qty: 270 TABLET | Refills: 0 | Status: SHIPPED | OUTPATIENT
Start: 2020-04-07 | End: 2020-08-07

## 2020-04-07 RX ORDER — POTASSIUM CHLORIDE 750 MG/1
10 TABLET, EXTENDED RELEASE ORAL DAILY
Qty: 90 TABLET | Refills: 0 | Status: SHIPPED | OUTPATIENT
Start: 2020-04-07 | End: 2020-10-12

## 2020-04-07 RX ORDER — SIMVASTATIN 10 MG
10 TABLET ORAL AT BEDTIME
Qty: 90 TABLET | Refills: 0 | Status: SHIPPED | OUTPATIENT
Start: 2020-04-07 | End: 2020-10-12

## 2020-04-07 RX ORDER — AMLODIPINE BESYLATE 2.5 MG/1
2.5 TABLET ORAL DAILY
Qty: 90 TABLET | Refills: 0 | Status: SHIPPED | OUTPATIENT
Start: 2020-04-07 | End: 2020-08-28

## 2020-04-07 NOTE — PROGRESS NOTES
"Subjective     Steve José is a 69 year old male who is being evaluated via a billable telephone visit.      The patient has been notified of following:     \"This telephone visit will be conducted via a call between you and your physician/provider. We have found that certain health care needs can be provided without the need for a physical exam.  This service lets us provide the care you need with a short phone conversation.  If a prescription is necessary we can send it directly to your pharmacy.  If lab work is needed we can place an order for that and you can then stop by our lab to have the test done at a later time.    If during the course of the call the physician/provider feels a telephone visit is not appropriate, you will not be charged for this service.\"     Patient has given verbal consent for Telephone visit?  Yes  10:22 AM-start  10:33 AM-end visit      Steve José complains of recheck meds    ALLERGIES  Patient has no known allergies.    Hypertension Follow-up      Do you check your blood pressure regularly outside of the clinic? Yes     Are you following a low salt diet? Yes    Are your blood pressures ever more than 140 on the top number (systolic) OR more   than 90 on the bottom number (diastolic), for example 140/90? Yes      How many servings of fruits and vegetables do you eat daily?  0-1    On average, how many sweetened beverages do you drink each day (Examples: soda, juice, sweet tea, etc.  Do NOT count diet or artificially sweetened beverages)?   0    How many days per week do you exercise enough to make your heart beat faster? 7    How many minutes a day do you exercise enough to make your heart beat faster? 1 hour    How many days per week do you miss taking your medication? 0        Hyperlipidemia Follow-Up      Are you regularly taking any medication or supplement to lower your cholesterol?   Yes- statins    Are you having muscle aches or other side effects that you think could be " caused by your cholesterol lowering medication?  No      Patient Active Problem List   Diagnosis     GERD (gastroesophageal reflux disease)     Hyperlipidemia LDL goal <130     Hypertension goal BP (blood pressure) < 140/90     Advanced directives, counseling/discussion     Hypertrophy of prostate with urinary obstruction     Past Surgical History:   Procedure Laterality Date     ABDOMEN SURGERY  1978    Right ureter reimplantation     APPENDECTOMY       BIOPSY  five times    Prostate     C GENITAL SURG PROC,MALE UNLISTED  2001    left hydrocele     C GENITAL SURG PROC,MALE UNLISTED  1977    right ureteral reimplantation     COLONOSCOPY  1/6/2018     HRW CORNEAL TRANSPLANT, CRNA       LASIK  2005     TURP  1998    x 2       Social History     Tobacco Use     Smoking status: Never Smoker     Smokeless tobacco: Never Used   Substance Use Topics     Alcohol use: Yes     Alcohol/week: 0.0 standard drinks     Comment: rare     Family History   Problem Relation Age of Onset     C.A.D. Father 64        d. MI     Hypertension Father      Hypertension Mother      Cancer Mother         skin     Diabetes No family hx of      Cancer - colorectal No family hx of          Current Outpatient Medications   Medication Sig Dispense Refill     amLODIPine (NORVASC) 2.5 MG tablet Take 1 tablet (2.5 mg) by mouth daily 90 tablet 0     atenolol (TENORMIN) 25 MG tablet TAKE 2 TABLETS BY MOUTH IN THE MORNING AND 1 ONCE DAILY IN THE EVENING 270 tablet 0     finasteride (PROSCAR) 5 MG tablet Take 1 tablet (5 mg) by mouth daily 90 tablet 3     hydrochlorothiazide (HYDRODIURIL) 25 MG tablet Take 1 tablet (25 mg) by mouth daily 90 tablet 0     Multiple Vitamins-Minerals (MULTI FOR HIM PO) Take  by mouth daily.       potassium chloride ER (KLOR-CON M) 10 MEQ CR tablet Take 1 tablet (10 mEq) by mouth daily 90 tablet 0     simvastatin (ZOCOR) 10 MG tablet Take 1 tablet (10 mg) by mouth At Bedtime 90 tablet 0     tamsulosin (FLOMAX) 0.4 MG capsule  Take 1 capsule (0.4 mg) by mouth At Bedtime 90 capsule 1     tiZANidine (ZANAFLEX) 4 MG tablet TAKE 1 TABLET BY MOUTH THREE TIMES DAILY AS NEEDED FOR  MUSCLE  SPAMS 30 tablet 0     aspirin 81 MG tablet Take 1 tablet by mouth daily.       Calcium Carbonate-Vitamin D (CALCIUM 600 + D OR) Take  by mouth daily.       ranitidine (ZANTAC) 150 MG tablet Take 1 tablet (150 mg) by mouth 2 times daily (Patient not taking: Reported on 4/7/2020) 180 tablet 3     No Known Allergies  Recent Labs   Lab Test 04/08/19  0927 10/22/18  0912  09/26/17  1110  10/06/16  0843  04/17/15  0822   A1C  --   --   --   --   --   --   --  5.8   LDL 55 67  --  52  --  71   < >  --    HDL 66 60  --  66  --  57   < >  --    TRIG 62 60  --  60  --  55   < >  --    ALT 23 26  --   --   --  29  --   --    CR 0.90 0.86   < > 0.82   < > 0.83   < > 0.80   GFRESTIMATED 86 88   < > >90   < > >90  Non  GFR Calc     < > >90  Non  GFR Calc     GFRESTBLACK >90 >90   < > >90   < > >90  African American GFR Calc     < > >90   GFR Calc     POTASSIUM 4.0 3.9   < > 4.1   < > 4.2   < > 3.8    < > = values in this interval not displayed.      BP Readings from Last 3 Encounters:   08/13/19 130/78   07/31/19 (!) 150/80   04/30/19 132/80    Wt Readings from Last 3 Encounters:   08/13/19 82.2 kg (181 lb 3.2 oz)   07/31/19 82.6 kg (182 lb)   04/08/19 80.7 kg (178 lb)                    Reviewed and updated as needed this visit by Provider  Tobacco  Allergies  Meds  Problems  Med Hx  Surg Hx  Fam Hx         Review of Systems   ROS COMP: CONSTITUTIONAL: NEGATIVE for fever, chills, change in weight  ENT/MOUTH: NEGATIVE for ear, mouth and throat problems  RESP: NEGATIVE for significant cough or SOB  CV: NEGATIVE for chest pain, palpitations or peripheral edema  GI: NEGATIVE for nausea, abdominal pain, heartburn, or change in bowel habits  MUSCULOSKELETAL: NEGATIVE for significant arthralgias or myalgia  ROS otherwise  negative        Objective   Reported vitals:  There were no vitals taken for this visit.   Phone visit  Blood Pressure at Home average 136/85 per pt    Diagnostic Test Results:  Labs reviewed in Epic        Assessment/Plan:  1. Hypertension goal BP (blood pressure) < 140/90  Has been stable at Home  Advised lab appointment ones Things are stable in the next 4-6 weeks  - hydrochlorothiazide (HYDRODIURIL) 25 MG tablet; Take 1 tablet (25 mg) by mouth daily  Dispense: 90 tablet; Refill: 0  - atenolol (TENORMIN) 25 MG tablet; TAKE 2 TABLETS BY MOUTH IN THE MORNING AND 1 ONCE DAILY IN THE EVENING  Dispense: 270 tablet; Refill: 0  - amLODIPine (NORVASC) 2.5 MG tablet; Take 1 tablet (2.5 mg) by mouth daily  Dispense: 90 tablet; Refill: 0  - potassium chloride ER (KLOR-CON M) 10 MEQ CR tablet; Take 1 tablet (10 mEq) by mouth daily  Dispense: 90 tablet; Refill: 0  - Comprehensive metabolic panel; Future  - Lipid panel reflex to direct LDL Fasting; Future    2. Hyperlipidemia LDL goal <130  Lab appointment   Refill done  - simvastatin (ZOCOR) 10 MG tablet; Take 1 tablet (10 mg) by mouth At Bedtime  Dispense: 90 tablet; Refill: 0  - Comprehensive metabolic panel; Future  - Lipid panel reflex to direct LDL Fasting; Future    Return in about 6 weeks (around 5/19/2020) for Lab Work.      Phone call duration:  11 minutes    iBta Deluca MD

## 2020-04-20 DIAGNOSIS — N40.1 HYPERTROPHY OF PROSTATE WITH URINARY OBSTRUCTION: ICD-10-CM

## 2020-04-20 DIAGNOSIS — N13.8 HYPERTROPHY OF PROSTATE WITH URINARY OBSTRUCTION: ICD-10-CM

## 2020-04-21 RX ORDER — FINASTERIDE 5 MG/1
5 TABLET, FILM COATED ORAL DAILY
Qty: 90 TABLET | Refills: 0 | Status: SHIPPED | OUTPATIENT
Start: 2020-04-21 | End: 2020-04-28

## 2020-04-23 DIAGNOSIS — I10 HYPERTENSION GOAL BP (BLOOD PRESSURE) < 140/90: ICD-10-CM

## 2020-04-23 DIAGNOSIS — E78.5 HYPERLIPIDEMIA LDL GOAL <130: ICD-10-CM

## 2020-04-23 DIAGNOSIS — R97.20 ELEVATED PROSTATE SPECIFIC ANTIGEN (PSA): ICD-10-CM

## 2020-04-23 LAB
ALBUMIN SERPL-MCNC: 3.8 G/DL (ref 3.4–5)
ALP SERPL-CCNC: 56 U/L (ref 40–150)
ALT SERPL W P-5'-P-CCNC: 28 U/L (ref 0–70)
ANION GAP SERPL CALCULATED.3IONS-SCNC: 1 MMOL/L (ref 3–14)
AST SERPL W P-5'-P-CCNC: 20 U/L (ref 0–45)
BILIRUB SERPL-MCNC: 0.6 MG/DL (ref 0.2–1.3)
BUN SERPL-MCNC: 26 MG/DL (ref 7–30)
CALCIUM SERPL-MCNC: 9.3 MG/DL (ref 8.5–10.1)
CHLORIDE SERPL-SCNC: 105 MMOL/L (ref 94–109)
CHOLEST SERPL-MCNC: 148 MG/DL
CO2 SERPL-SCNC: 35 MMOL/L (ref 20–32)
CREAT SERPL-MCNC: 0.86 MG/DL (ref 0.66–1.25)
GFR SERPL CREATININE-BSD FRML MDRD: 87 ML/MIN/{1.73_M2}
GLUCOSE SERPL-MCNC: 91 MG/DL (ref 70–99)
HDLC SERPL-MCNC: 70 MG/DL
LDLC SERPL CALC-MCNC: 67 MG/DL
NONHDLC SERPL-MCNC: 78 MG/DL
POTASSIUM SERPL-SCNC: 4.4 MMOL/L (ref 3.4–5.3)
PROT SERPL-MCNC: 6.7 G/DL (ref 6.8–8.8)
PSA SERPL-MCNC: 4 UG/L (ref 0–4)
SODIUM SERPL-SCNC: 141 MMOL/L (ref 133–144)
TRIGL SERPL-MCNC: 56 MG/DL

## 2020-04-23 PROCEDURE — 80061 LIPID PANEL: CPT | Performed by: FAMILY MEDICINE

## 2020-04-23 PROCEDURE — 36415 COLL VENOUS BLD VENIPUNCTURE: CPT | Performed by: FAMILY MEDICINE

## 2020-04-23 PROCEDURE — 80053 COMPREHEN METABOLIC PANEL: CPT | Performed by: FAMILY MEDICINE

## 2020-04-23 PROCEDURE — 84153 ASSAY OF PSA TOTAL: CPT | Performed by: FAMILY MEDICINE

## 2020-04-28 ENCOUNTER — VIRTUAL VISIT (OUTPATIENT)
Dept: UROLOGY | Facility: CLINIC | Age: 70
End: 2020-04-28
Payer: COMMERCIAL

## 2020-04-28 VITALS — DIASTOLIC BLOOD PRESSURE: 78 MMHG | SYSTOLIC BLOOD PRESSURE: 138 MMHG

## 2020-04-28 DIAGNOSIS — N13.8 HYPERTROPHY OF PROSTATE WITH URINARY OBSTRUCTION: ICD-10-CM

## 2020-04-28 DIAGNOSIS — R97.20 ELEVATED PROSTATE SPECIFIC ANTIGEN (PSA): Primary | ICD-10-CM

## 2020-04-28 DIAGNOSIS — N40.1 HYPERTROPHY OF PROSTATE WITH URINARY OBSTRUCTION: ICD-10-CM

## 2020-04-28 PROCEDURE — 99212 OFFICE O/P EST SF 10 MIN: CPT | Mod: 95 | Performed by: UROLOGY

## 2020-04-28 RX ORDER — FAMOTIDINE 10 MG
10 TABLET ORAL 2 TIMES DAILY
COMMUNITY
End: 2024-04-08

## 2020-04-28 RX ORDER — TAMSULOSIN HYDROCHLORIDE 0.4 MG/1
0.4 CAPSULE ORAL AT BEDTIME
Qty: 90 CAPSULE | Refills: 3 | Status: SHIPPED | OUTPATIENT
Start: 2020-04-28 | End: 2021-08-09

## 2020-04-28 RX ORDER — FINASTERIDE 5 MG/1
5 TABLET, FILM COATED ORAL DAILY
Qty: 90 TABLET | Refills: 3 | Status: SHIPPED | OUTPATIENT
Start: 2020-04-28 | End: 2021-08-03

## 2020-04-28 NOTE — PROGRESS NOTES
"Steve José is a 69 year old male who is being evaluated via a billable telephone visit.      The patient has been notified of following:     \"This telephone visit will be conducted via a call between you and your physician/provider. We have found that certain health care needs can be provided without the need for a physical exam.  This service lets us provide the care you need with a short phone conversation.  If a prescription is necessary we can send it directly to your pharmacy.  If lab work is needed we can place an order for that and you can then stop by our lab to have the test done at a later time.    Telephone visits are billed at different rates depending on your insurance coverage. During this emergency period, for some insurers they may be billed the same as an in-person visit.  Please reach out to your insurance provider with any questions.    If during the course of the call the physician/provider feels a telephone visit is not appropriate, you will not be charged for this service.\"    Patient has given verbal consent for Telephone visit?  Yes    How would you like to obtain your AVS? MyChart     f/u benign prostatic hyperplasia/elevated psa.  He is s/p biopsy in 2017.  Prostate size was 70 cc.  He is on proscar/flomax.  He is doing well without any complaints.  Recent psa was 4.    Cont with meds/recheck psa in one year    Phone call duration: 5 minutes    Semaj Sotelo MD        "

## 2020-08-07 DIAGNOSIS — I10 HYPERTENSION GOAL BP (BLOOD PRESSURE) < 140/90: ICD-10-CM

## 2020-08-07 RX ORDER — ATENOLOL 25 MG/1
TABLET ORAL
Qty: 270 TABLET | Refills: 2 | Status: SHIPPED | OUTPATIENT
Start: 2020-08-07 | End: 2021-02-08

## 2020-08-27 DIAGNOSIS — I10 HYPERTENSION GOAL BP (BLOOD PRESSURE) < 140/90: ICD-10-CM

## 2020-08-28 RX ORDER — AMLODIPINE BESYLATE 2.5 MG/1
TABLET ORAL
Qty: 90 TABLET | Refills: 1 | Status: SHIPPED | OUTPATIENT
Start: 2020-08-28 | End: 2021-02-04

## 2020-10-11 DIAGNOSIS — E78.5 HYPERLIPIDEMIA LDL GOAL <130: ICD-10-CM

## 2020-10-11 DIAGNOSIS — I10 HYPERTENSION GOAL BP (BLOOD PRESSURE) < 140/90: ICD-10-CM

## 2020-10-12 RX ORDER — HYDROCHLOROTHIAZIDE 25 MG/1
TABLET ORAL
Qty: 90 TABLET | Refills: 0 | Status: SHIPPED | OUTPATIENT
Start: 2020-10-12 | End: 2021-01-12

## 2020-10-12 RX ORDER — SIMVASTATIN 10 MG
TABLET ORAL
Qty: 90 TABLET | Refills: 0 | Status: SHIPPED | OUTPATIENT
Start: 2020-10-12 | End: 2021-01-12

## 2020-10-12 RX ORDER — POTASSIUM CHLORIDE 750 MG/1
TABLET, EXTENDED RELEASE ORAL
Qty: 90 TABLET | Refills: 0 | Status: SHIPPED | OUTPATIENT
Start: 2020-10-12 | End: 2021-01-12

## 2020-10-12 NOTE — TELEPHONE ENCOUNTER
Please see chart note.  Thank you. Hailey Styles R.N.    This refill/encounter is being handled by a team outside your facility.  If action needs to be taken, please route the encounter back to your team that would normally handle it. Thank you. Hailey Styles R.N.

## 2020-10-12 NOTE — TELEPHONE ENCOUNTER
When trying to sign the Rx to give the patient enough medication to get them to their appointment, the warning below came up.  Please sign Prescription(s) if appropriate.  Thank you. Hailey Styles R.N.

## 2020-11-21 DIAGNOSIS — M54.50 RIGHT LOW BACK PAIN, UNSPECIFIED CHRONICITY, UNSPECIFIED WHETHER SCIATICA PRESENT: ICD-10-CM

## 2020-12-06 ENCOUNTER — HEALTH MAINTENANCE LETTER (OUTPATIENT)
Age: 70
End: 2020-12-06

## 2021-01-08 DIAGNOSIS — E78.5 HYPERLIPIDEMIA LDL GOAL <130: ICD-10-CM

## 2021-01-08 DIAGNOSIS — I10 HYPERTENSION GOAL BP (BLOOD PRESSURE) < 140/90: ICD-10-CM

## 2021-01-12 RX ORDER — SIMVASTATIN 10 MG
TABLET ORAL
Qty: 90 TABLET | Refills: 0 | Status: SHIPPED | OUTPATIENT
Start: 2021-01-12 | End: 2021-02-08

## 2021-01-12 RX ORDER — HYDROCHLOROTHIAZIDE 25 MG/1
TABLET ORAL
Qty: 90 TABLET | Refills: 0 | Status: SHIPPED | OUTPATIENT
Start: 2021-01-12 | End: 2021-02-08

## 2021-01-12 RX ORDER — POTASSIUM CHLORIDE 750 MG/1
TABLET, EXTENDED RELEASE ORAL
Qty: 90 TABLET | Refills: 0 | Status: SHIPPED | OUTPATIENT
Start: 2021-01-12 | End: 2021-02-08

## 2021-01-12 NOTE — TELEPHONE ENCOUNTER
Prescription approved per [facility].  Zenaida Salazar RN

## 2021-02-08 ENCOUNTER — OFFICE VISIT (OUTPATIENT)
Dept: FAMILY MEDICINE | Facility: CLINIC | Age: 71
End: 2021-02-08
Payer: COMMERCIAL

## 2021-02-08 VITALS
TEMPERATURE: 98.1 F | RESPIRATION RATE: 18 BRPM | WEIGHT: 188 LBS | DIASTOLIC BLOOD PRESSURE: 88 MMHG | OXYGEN SATURATION: 100 % | SYSTOLIC BLOOD PRESSURE: 138 MMHG | HEART RATE: 63 BPM | HEIGHT: 72 IN | BODY MASS INDEX: 25.47 KG/M2

## 2021-02-08 DIAGNOSIS — E78.5 HYPERLIPIDEMIA LDL GOAL <130: ICD-10-CM

## 2021-02-08 DIAGNOSIS — I10 HYPERTENSION GOAL BP (BLOOD PRESSURE) < 140/90: ICD-10-CM

## 2021-02-08 DIAGNOSIS — M79.89 SWELLING OF LEFT LOWER EXTREMITY: Primary | ICD-10-CM

## 2021-02-08 DIAGNOSIS — Z00.00 ENCOUNTER FOR MEDICARE ANNUAL WELLNESS EXAM: ICD-10-CM

## 2021-02-08 LAB
ANION GAP SERPL CALCULATED.3IONS-SCNC: 4 MMOL/L (ref 3–14)
BUN SERPL-MCNC: 22 MG/DL (ref 7–30)
CALCIUM SERPL-MCNC: 9.1 MG/DL (ref 8.5–10.1)
CHLORIDE SERPL-SCNC: 102 MMOL/L (ref 94–109)
CHOLEST SERPL-MCNC: 148 MG/DL
CO2 SERPL-SCNC: 34 MMOL/L (ref 20–32)
CREAT SERPL-MCNC: 0.86 MG/DL (ref 0.66–1.25)
GFR SERPL CREATININE-BSD FRML MDRD: 86 ML/MIN/{1.73_M2}
GLUCOSE SERPL-MCNC: 100 MG/DL (ref 70–99)
HDLC SERPL-MCNC: 64 MG/DL
LDLC SERPL CALC-MCNC: 70 MG/DL
NONHDLC SERPL-MCNC: 84 MG/DL
POTASSIUM SERPL-SCNC: 4.1 MMOL/L (ref 3.4–5.3)
SODIUM SERPL-SCNC: 140 MMOL/L (ref 133–144)
TRIGL SERPL-MCNC: 70 MG/DL

## 2021-02-08 PROCEDURE — 80048 BASIC METABOLIC PNL TOTAL CA: CPT | Performed by: FAMILY MEDICINE

## 2021-02-08 PROCEDURE — 36415 COLL VENOUS BLD VENIPUNCTURE: CPT | Performed by: FAMILY MEDICINE

## 2021-02-08 PROCEDURE — 99214 OFFICE O/P EST MOD 30 MIN: CPT | Mod: 25 | Performed by: FAMILY MEDICINE

## 2021-02-08 PROCEDURE — 80061 LIPID PANEL: CPT | Performed by: FAMILY MEDICINE

## 2021-02-08 PROCEDURE — 99397 PER PM REEVAL EST PAT 65+ YR: CPT | Performed by: FAMILY MEDICINE

## 2021-02-08 RX ORDER — HYDROCHLOROTHIAZIDE 25 MG/1
TABLET ORAL
Qty: 90 TABLET | Refills: 3 | Status: SHIPPED | OUTPATIENT
Start: 2021-02-08 | End: 2022-01-24

## 2021-02-08 RX ORDER — ATENOLOL 25 MG/1
TABLET ORAL
Qty: 270 TABLET | Refills: 2 | Status: SHIPPED | OUTPATIENT
Start: 2021-02-08 | End: 2022-01-24

## 2021-02-08 RX ORDER — SIMVASTATIN 10 MG
TABLET ORAL
Qty: 90 TABLET | Refills: 3 | Status: SHIPPED | OUTPATIENT
Start: 2021-02-08 | End: 2022-01-24

## 2021-02-08 RX ORDER — POTASSIUM CHLORIDE 750 MG/1
TABLET, EXTENDED RELEASE ORAL
Qty: 90 TABLET | Refills: 3 | Status: SHIPPED | OUTPATIENT
Start: 2021-02-08 | End: 2022-01-24

## 2021-02-08 RX ORDER — AMLODIPINE BESYLATE 2.5 MG/1
2.5 TABLET ORAL DAILY
Qty: 90 TABLET | Refills: 3 | Status: SHIPPED | OUTPATIENT
Start: 2021-02-08 | End: 2022-01-24

## 2021-02-08 ASSESSMENT — MIFFLIN-ST. JEOR: SCORE: 1640.76

## 2021-02-08 ASSESSMENT — ACTIVITIES OF DAILY LIVING (ADL): CURRENT_FUNCTION: NO ASSISTANCE NEEDED

## 2021-02-08 ASSESSMENT — PAIN SCALES - GENERAL: PAINLEVEL: NO PAIN (0)

## 2021-02-08 NOTE — PROGRESS NOTES
"SUBJECTIVE:   Steve José is a 70 year old male who presents for Preventive Visit.      Patient has been advised of split billing requirements and indicates understanding: Yes   Are you in the first 12 months of your Medicare coverage?  No    Healthy Habits:     In general, how would you rate your overall health?  Very good    Frequency of exercise:  6-7 days/week    Duration of exercise:  30-45 minutes    Do you usually eat at least 4 servings of fruit and vegetables a day, include whole grains    & fiber and avoid regularly eating high fat or \"junk\" foods?  No    Taking medications regularly:  0    Barriers to taking medications:  None    Medication side effects:  None    Ability to successfully perform activities of daily living:  No assistance needed    Home Safety:  No safety concerns identified    Hearing Impairment:  No hearing concerns    In the past 6 months, have you been bothered by leaking of urine? Yes    In general, how would you rate your overall mental or emotional health?  Good      PHQ-2 Total Score: 0    Additional concerns today:  Yes (left ankle swollen)  History of Present Illness       Hyperlipidemia:  He presents for follow up of hyperlipidemia.  He is taking medication to lower cholesterol. He is not having myalgia or other side effects to statin medications.    Hypertension: He presents for follow up of hypertension.  He does check blood pressure  regularly outside of the clinic. Outside blood pressures have been over 140/90. (sometimes) He follows a low salt diet.     He eats 2-3 servings of fruits and vegetables daily.He consumes 0 sweetened beverage(s) daily.He exercises with enough effort to increase his heart rate 30 to 60 minutes per day.  He exercises with enough effort to increase his heart rate 7 days per week.   He is taking medications regularly.  He is not taking prescribed medications regularly due to None.    Do you feel safe in your environment? Yes    Have you ever done " Advance Care Planning? (For example, a Health Directive, POLST, or a discussion with a medical provider or your loved ones about your wishes): Yes, patient states has an Advance Care Planning document and will bring a copy to the clinic.       Fall risk  Fallen 2 or more times in the past year?: No  Any fall with injury in the past year?: No    Cognitive Screening   1) Repeat 3 items (Leader, Season, Table)    2) Clock draw: NORMAL  3) 3 item recall: Recalls 3 objects  Results: 3 items recalled: COGNITIVE IMPAIRMENT LESS LIKELY    Mini-CogTM Copyright S Sugey. Licensed by the author for use in [facility]; reprinted with permission (sovianney@[facility]). All rights reserved.      Do you have sleep apnea, excessive snoring or daytime drowsiness?: no    Reviewed and updated as needed this visit by clinical staff  Tobacco  Allergies  Meds  Problems  Med Hx  Surg Hx  Fam Hx          Reviewed and updated as needed this visit by Provider  Tobacco  Allergies  Meds  Problems  Med Hx  Surg Hx  Fam Hx         Social History     Tobacco Use     Smoking status: Never Smoker     Smokeless tobacco: Never Used   Substance Use Topics     Alcohol use: Yes     Alcohol/week: 0.0 standard drinks     Comment: rare     If you drink alcohol do you typically have >3 drinks per day or >7 drinks per week? No    Alcohol Use 7/31/2019   Prescreen: >3 drinks/day or >7 drinks/week? Not Applicable   Prescreen: >3 drinks/day or >7 drinks/week? -   No flowsheet data found.            Current providers sharing in care for this patient include:   Patient Care Team:  Bita Deluca MD as PCP - General  Bita Deluca MD as Assigned PCP  Semaj Sotelo MD as Assigned Surgical Provider    The following health maintenance items are reviewed in Epic and correct as of today:  Health Maintenance   Topic Date Due     AORTIC ANEURYSM SCREENING (SYSTEM ASSIGNED)  05/09/2013     FALL RISK ASSESSMENT  07/31/2020     BMP  04/23/2021      LIPID  04/23/2021     MEDICARE ANNUAL WELLNESS VISIT  02/08/2022     COLORECTAL CANCER SCREENING  12/13/2022     ADVANCE CARE PLANNING  02/08/2026     DTAP/TDAP/TD IMMUNIZATION (3 - Td) 03/20/2028     HEPATITIS C SCREENING  Completed     PHQ-2  Completed     INFLUENZA VACCINE  Completed     Pneumococcal Vaccine: 65+ Years  Completed     ZOSTER IMMUNIZATION  Completed     Pneumococcal Vaccine: Pediatrics (0 to 5 Years) and At-Risk Patients (6 to 64 Years)  Aged Out     IPV IMMUNIZATION  Aged Out     MENINGITIS IMMUNIZATION  Aged Out     HEPATITIS B IMMUNIZATION  Aged Out     Lab work is in process  Labs reviewed in EPIC  BP Readings from Last 3 Encounters:   02/08/21 138/88   04/28/20 138/78   08/13/19 130/78    Wt Readings from Last 3 Encounters:   02/08/21 85.3 kg (188 lb)   08/13/19 82.2 kg (181 lb 3.2 oz)   07/31/19 82.6 kg (182 lb)                  Patient Active Problem List   Diagnosis     GERD (gastroesophageal reflux disease)     Hyperlipidemia LDL goal <130     Hypertension goal BP (blood pressure) < 140/90     Advanced directives, counseling/discussion     Hypertrophy of prostate with urinary obstruction     Past Surgical History:   Procedure Laterality Date     ABDOMEN SURGERY  1978    Right ureter reimplantation     APPENDECTOMY       BIOPSY  five times    Prostate     C GENITAL SURG PROC,MALE UNLISTED  2001    left hydrocele     C GENITAL SURG PROC,MALE UNLISTED  1977    right ureteral reimplantation     COLONOSCOPY  1/6/2018     HRW CORNEAL TRANSPLANT, CRNA       LASIK  2005     TURP  1998    x 2       Social History     Tobacco Use     Smoking status: Never Smoker     Smokeless tobacco: Never Used   Substance Use Topics     Alcohol use: Yes     Alcohol/week: 0.0 standard drinks     Comment: rare     Family History   Problem Relation Age of Onset     C.A.D. Father 64        d. MI     Hypertension Father      Hypertension Mother      Cancer Mother         skin     Diabetes No family hx of      Cancer -  colorectal No family hx of          Current Outpatient Medications   Medication Sig Dispense Refill     amLODIPine (NORVASC) 2.5 MG tablet Take 1 tablet (2.5 mg) by mouth daily 90 tablet 3     atenolol (TENORMIN) 25 MG tablet TAKE 2 TABLETS BY MOUTH IN THE MORNING AND 1 IN THE EVENING DAILY 270 tablet 2     famotidine (PEPCID) 10 MG tablet Take 10 mg by mouth 2 times daily       finasteride (PROSCAR) 5 MG tablet Take 1 tablet (5 mg) by mouth daily 90 tablet 3     hydrochlorothiazide (HYDRODIURIL) 25 MG tablet TAKE 1 TABLET BY MOUTH ONCE DAILY . APPOINTMENT REQUIRED FOR FUTURE REFILLS 90 tablet 3     Multiple Vitamins-Minerals (MULTI FOR HIM PO) Take  by mouth daily.       potassium chloride ER (KLOR-CON M) 10 MEQ CR tablet TAKE 1  BY MOUTH ONCE DAILY . APPOINTMENT REQUIRED FOR FUTURE REFILLS 90 tablet 3     simvastatin (ZOCOR) 10 MG tablet TAKE 1 TABLET BY MOUTH AT BEDTIME . APPOINTMENT REQUIRED FOR FUTURE REFILLS 90 tablet 3     tamsulosin (FLOMAX) 0.4 MG capsule Take 1 capsule (0.4 mg) by mouth At Bedtime 90 capsule 3     tiZANidine (ZANAFLEX) 4 MG tablet Take 1 tablet by mouth three times daily as needed for muscle spasm 30 tablet 0     No Known Allergies  Recent Labs   Lab Test 04/23/20  1044 04/08/19  0927 10/22/18  0912 04/17/15  0822 04/17/15  0822   A1C  --   --   --   --  5.8   LDL 67 55 67   < >  --    HDL 70 66 60   < >  --    TRIG 56 62 60   < >  --    ALT 28 23 26   < >  --    CR 0.86 0.90 0.86   < > 0.80   GFRESTIMATED 87 86 88   < > >90  Non  GFR Calc     GFRESTBLACK >90 >90 >90   < > >90  African American GFR Calc     POTASSIUM 4.4 4.0 3.9   < > 3.8    < > = values in this interval not displayed.      .Pt gets swelling off and on Left Lower Extremity below Knee  Has been getting for a long Time but feels It is a Little worse  No pain    Review of Systems  CONSTITUTIONAL: NEGATIVE for fever, chills, change in weight  INTEGUMENTARY/SKIN: NEGATIVE for worrisome rashes, moles or  lesions  EYES: NEGATIVE for vision changes or irritation  ENT/MOUTH: NEGATIVE for ear, mouth and throat problems  RESP: NEGATIVE for significant cough or SOB  BREAST: NEGATIVE for masses, tenderness or discharge  CV: NEGATIVE for chest pain, palpitations or peripheral edema  GI: NEGATIVE for nausea, abdominal pain, heartburn, or change in bowel habits  : NEGATIVE for frequency, dysuria, or hematuria  MUSCULOSKELETAL: NEGATIVE for significant arthralgias or myalgia-as above  NEURO: NEGATIVE for weakness, dizziness or paresthesias  ENDOCRINE: NEGATIVE for temperature intolerance, skin/hair changes  HEME: NEGATIVE for bleeding problems  PSYCHIATRIC: NEGATIVE for changes in mood or affect    OBJECTIVE:   /88   Pulse 63   Temp 98.1  F (36.7  C) (Oral)   Resp 18   Ht 1.829 m (6')   Wt 85.3 kg (188 lb)   SpO2 100%   BMI 25.50 kg/m   Estimated body mass index is 25.5 kg/m  as calculated from the following:    Height as of this encounter: 1.829 m (6').    Weight as of this encounter: 85.3 kg (188 lb).  Physical Exam  GENERAL: healthy, alert and no distress  EYES: Eyes grossly normal to inspection, PERRL and conjunctivae and sclerae normal  HENT: ear canals and TM's normal, nose and mouth without ulcers or lesions  NECK: no adenopathy, no asymmetry, masses, or scars and thyroid normal to palpation  RESP: lungs clear to auscultation - no rales, rhonchi or wheezes  CV: regular rate and rhythm, normal S1 S2, no S3 or S4, no murmur, click or rub, no peripheral edema and peripheral pulses strong  ABDOMEN: soft, nontender, no hepatosplenomegaly, no masses and bowel sounds normal  MS: no gross musculoskeletal defects noted, no edema  SKIN: no suspicious lesions or rashes  NEURO: Normal strength and tone, mentation intact and speech normal  PSYCH: mentation appears normal, affect normal/bright    Diagnostic Test Results:  Labs reviewed in Epic  Pending     ASSESSMENT / PLAN:   1. Encounter for Medicare annual wellness  exam      2. Hypertension goal BP (blood pressure) < 140/90  Stable   Will review labs  - amLODIPine (NORVASC) 2.5 MG tablet; Take 1 tablet (2.5 mg) by mouth daily  Dispense: 90 tablet; Refill: 3  - atenolol (TENORMIN) 25 MG tablet; TAKE 2 TABLETS BY MOUTH IN THE MORNING AND 1 IN THE EVENING DAILY  Dispense: 270 tablet; Refill: 2  - hydrochlorothiazide (HYDRODIURIL) 25 MG tablet; TAKE 1 TABLET BY MOUTH ONCE DAILY . APPOINTMENT REQUIRED FOR FUTURE REFILLS  Dispense: 90 tablet; Refill: 3  - potassium chloride ER (KLOR-CON M) 10 MEQ CR tablet; TAKE 1  BY MOUTH ONCE DAILY . APPOINTMENT REQUIRED FOR FUTURE REFILLS  Dispense: 90 tablet; Refill: 3  - Lipid panel reflex to direct LDL Fasting  - Basic metabolic panel    3. Hyperlipidemia LDL goal <130  Lab s pending   Will review  - simvastatin (ZOCOR) 10 MG tablet; TAKE 1 TABLET BY MOUTH AT BEDTIME . APPOINTMENT REQUIRED FOR FUTURE REFILLS  Dispense: 90 tablet; Refill: 3  - Lipid panel reflex to direct LDL Fasting    4. Swelling of left lower extremity  Advised   - US Lower Extremity Venous Duplex Left; Future r/o DVT  Elevate  Follow up if not better  Patient has been advised of split billing requirements and indicates understanding: Yes  COUNSELING:  Reviewed preventive health counseling, as reflected in patient instructions       Regular exercise       Healthy diet/nutrition       Vision screening       Hearing screening       Dental care       Bladder control       Colon cancer screening       Prostate cancer screening       The 10-year ASCVD risk score (Daron DAVIS Jr., et al., 2013) is: 20.8%    Values used to calculate the score:      Age: 70 years      Sex: Male      Is Non- : No      Diabetic: No      Tobacco smoker: No      Systolic Blood Pressure: 138 mmHg      Is BP treated: Yes      HDL Cholesterol: 70 mg/dL      Total Cholesterol: 148 mg/dL    Estimated body mass index is 25.5 kg/m  as calculated from the following:    Height as of this  encounter: 1.829 m (6').    Weight as of this encounter: 85.3 kg (188 lb).        He reports that he has never smoked. He has never used smokeless tobacco.      Appropriate preventive services were discussed with this patient, including applicable screening as appropriate for cardiovascular disease, diabetes, osteopenia/osteoporosis, and glaucoma.  As appropriate for age/gender, discussed screening for colorectal cancer, prostate cancer, breast cancer, and cervical cancer. Checklist reviewing preventive services available has been given to the patient.    Reviewed patients plan of care and provided an AVS. The Intermediate Care Plan ( asthma action plan, low back pain action plan, and migraine action plan) for Steve meets the Care Plan requirement. This Care Plan has been established and reviewed with the Patient.    Counseling Resources:  ATP IV Guidelines  Pooled Cohorts Equation Calculator  Breast Cancer Risk Calculator  Breast Cancer: Medication to Reduce Risk  FRAX Risk Assessment  ICSI Preventive Guidelines  Dietary Guidelines for Americans, 2010  USDA's MyPlate  ASA Prophylaxis  Lung CA Screening    Bita Deluca MD  [facility]    Identified Health Risks:

## 2021-02-08 NOTE — PATIENT INSTRUCTIONS
Patient Education   Personalized Prevention Plan  You are due for the preventive services outlined below.  Your care team is available to assist you in scheduling these services.  If you have already completed any of these items, please share that information with your care team to update in your medical record.  Health Maintenance Due   Topic Date Due     AORTIC ANEURYSM SCREENING (SYSTEM ASSIGNED)  05/09/2013     FALL RISK ASSESSMENT  07/31/2020       Understanding USDA MyPlate  The USDA has guidelines to help you make healthy food choices. These are called MyPlate. MyPlate shows the food groups that make up healthy meals using the image of a place setting. Before you eat, think about the healthiest choices for what to put on your plate or in your cup or bowl. To learn more about building a healthy plate, visit www.choosemyplate.gov.     The food groups    Fruits. Any fruit or 100% fruit juice counts as part of the Fruit Group. Fruits may be fresh, canned, frozen, or dried, and may be whole, cut-up, or pureed. Make 1/2 of your plate fruits and vegetables.    Vegetables. Any vegetable or 100% vegetable juice counts as a member of the Vegetable Group. Vegetables may be fresh, frozen, canned, or dried. They can be served raw or cooked and may be whole, cut-up, or mashed. Make 1/2 of your plate fruits and vegetables.    Grains. All foods made from grains are part of the Grains Group. These include wheat, rice, oats, cornmeal, and barley. Grains are often used to make foods such as bread, pasta, oatmeal, cereal, tortillas, and grits. Grains should be no more than 1/4 of your plate. At least half of your grains should be whole grains.    Protein. This group includes meat, poultry, seafood, beans and peas, eggs, processed soy products (such as tofu), nuts (including nut butters), and seeds. Make protein choices no more than 1/4 of your plate. Meat and poultry choices should be lean or low fat.    Dairy. The Dairy Group  includes all fluid milk products and foods made from milk that contain calcium, such as yogurt and cheese. (Foods that have little calcium, such as cream, butter, and cream cheese, are not part of this group.) Most dairy choices should be low-fat or fat-free.    Oils. Oils aren't a food group, but they do contain essential nutrients. However it's important to watch your intake of oils. These are fats that are liquid at room temperature. They include canola, corn, olive, soybean, vegetable, and sunflower oil. Foods that are mainly oil include mayonnaise, certain salad dressings, and soft margarines. You likely already get your daily oil allowance from the foods you eat.  Things to limit  Eating healthy also means limiting these things in your diet:    Salt (sodium). Many processed foods have a lot of sodium. To keep sodium intake down, eat fresh vegetables, meats, poultry, and seafood when possible. Purchase low-sodium, reduced-sodium, or no-salt-added food products at the store. And don't add salt to your meals at home. Instead, season them with herbs and spices such as dill, oregano, cumin, and paprika. Or try adding flavor with lemon or lime zest and juice.    Saturated fat. Saturated fats are most often found in animal products such as beef, pork, and chicken. They are often solid at room temperature, such as butter. To reduce your saturated fat intake, choose leaner cuts of meat and poultry. And try healthier cooking methods such as grilling, broiling, roasting, or baking. For a simple lower-fat swap, use plain nonfat yogurt instead of mayonnaise when making potato salad or macaroni salad.    Added sugars. These are sugars added to foods. They are in foods such as ice cream, candy, soda, fruit drinks, sports drinks, energy drinks, cookies, pastries, jams, and syrups. Cut down on added sugars by sharing sweet treats with a family member or friend. You can also choose fruit for dessert, and drink water or other  unsweetened beverages.  Badge last reviewed this educational content on 6/1/2020 2000-2020 [employer]. [address] [place] [address], [place] [address]. All rights reserved. This information is not intended as a substitute for professional medical care. Always follow your healthcare professional's instructions.          Urinary Incontinence (Male)    Urinary incontinence means not being able to control the release of urine from the bladder.   Causes  Common causes of urinary incontinence in men include:    Infection    Certain medicines    Aging    Poor pelvic muscle tone    Bladder spasms    Obesity    Trouble urinating and fully emptying the bladder (urinary retention)  Other things that can cause incontinence are:     Nervous system diseases    Diabetes    Sleep apnea    Urinary tract infections    Prostate surgery    Pelvic injury  Constipation and smoking have also been identified as risk factors.   Symptoms    Urge incontinence (overactive bladder). This is a sudden urge to urinate. It occurs even though there may not be much urine in the bladder. The need to urinate often during the night is common. It's due to bladder spasms.    Stress incontinence. This is urine leakage that you can't control. It can occur with sneezing, coughing, and other actions that put stress on the bladder.    Treatment  Treatment depends on what is causing the condition. Bladder infections are treated with antibiotics. Urinary retention is treated with a bladder catheter.   Home care  Follow these guidelines when caring for yourself at home:    Don't have any foods and drinks that may irritate the bladder. This includes:  ? Chocolate  ? Alcohol  ? Caffeine  ? Carbonated drinks  ? Acidic fruits and juices    Limit fluids to 6 to 8 cups a day.    Lose weight if you are overweight. This will reduce your symptoms.    If advised, do regular pelvic muscle-strengthening exercises such as Kegel exercises.    If needed, wear  absorbent pads to catch urine. Change the pads often. This is for good hygiene and to prevent skin and bladder infections.    Bathe daily for good hygiene.    If an antibiotic was prescribed to treat a bladder infection, take it until it's finished. Keep taking it even if you are feeling better. This is to make sure your infection has cleared.    If a catheter was left in place, keep bacteria from getting into the collection bag. Don't disconnect the catheter from the collection bag.    Use a leg band to secure the catheter drainage tube, so it does not pull on the catheter. Drain the collection bag when it becomes full. To do this, use the drain spout at the bottom of the bag. Don't disconnect the bag from the catheter.    Don't pull on or try to remove a catheter. The catheter must be removed by a healthcare provider.    If you smoke, stop. Ask your provider for help if you can't do this on your own.  Follow-up care  Follow up with your healthcare provider, or as advised.  When to get medical advice  Call your healthcare provider right away if any of these occur:    Fever over 100.4 F (38 C), or as directed by your provider    Bladder pain or fullness    Belly swelling, nausea, or vomiting    Back pain    Weakness, dizziness, or fainting    If a catheter was left in place, return if:  ? The catheter falls out  ? The catheter stops draining for 6 hours  ? Your urine gets cloudy or smells bad  Judi last reviewed this educational content on 1/1/2020 2000-2020 [employer]. [address] [place] [address]. All rights reserved. This information is not intended as a substitute for professional medical care. Always follow your healthcare professional's instructions.    We are working hard to begin vaccinating more people against COVID-19. Currently, we are only vaccinating individuals age 75 and older and Phase 1a workers - healthcare workers who are unable to do their job remotely. Vaccine  availability is very limited.    If you are 75 or older, or a healthcare worker who is unable to do your job remotely, please log in to [facility] using this link to see if we have an open appointment and schedule an appointment.  If there are no appointments left, you will be unable to schedule and need to check back later.  If you are a healthcare worker, you will be asked to provide proof of employment at your appointment. If you cannot, you will be turned away.    Vaccine appointments are being added as they become available. Please check your [facility] account frequently for availability. If you have technical difficulty using [facility], call [phone] for assistance.    You can learn more about the [facility]'s phased approach to administering the vaccine, with details on each phase, [email or web address].      Aa vaccine supply increases and we are able to open appointments to more groups, we will share that information on our website [email or web address]. Check this website to stay up to date on COVID-19 vaccination information.

## 2021-02-08 NOTE — PROGRESS NOTES
The patient was counseled and encouraged to consider modifying their diet and eating habits. He was provided with information on recommended healthy diet options.  Information on urinary incontinence and treatment options given to patient.

## 2021-02-09 ENCOUNTER — ANCILLARY PROCEDURE (OUTPATIENT)
Dept: ULTRASOUND IMAGING | Facility: CLINIC | Age: 71
End: 2021-02-09
Attending: FAMILY MEDICINE
Payer: COMMERCIAL

## 2021-02-09 DIAGNOSIS — M79.89 SWELLING OF LEFT LOWER EXTREMITY: ICD-10-CM

## 2021-03-04 ENCOUNTER — IMMUNIZATION (OUTPATIENT)
Dept: NURSING | Facility: CLINIC | Age: 71
End: 2021-03-04
Payer: COMMERCIAL

## 2021-03-04 PROCEDURE — 91300 PR COVID VAC PFIZER DIL RECON 30 MCG/0.3 ML IM: CPT

## 2021-03-04 PROCEDURE — 0001A PR COVID VAC PFIZER DIL RECON 30 MCG/0.3 ML IM: CPT

## 2021-03-25 ENCOUNTER — IMMUNIZATION (OUTPATIENT)
Dept: NURSING | Facility: CLINIC | Age: 71
End: 2021-03-25
Attending: FAMILY MEDICINE
Payer: COMMERCIAL

## 2021-03-25 PROCEDURE — 91300 PR COVID VAC PFIZER DIL RECON 30 MCG/0.3 ML IM: CPT

## 2021-03-25 PROCEDURE — 0002A PR COVID VAC PFIZER DIL RECON 30 MCG/0.3 ML IM: CPT

## 2021-04-21 DIAGNOSIS — M54.50 RIGHT LOW BACK PAIN, UNSPECIFIED CHRONICITY, UNSPECIFIED WHETHER SCIATICA PRESENT: ICD-10-CM

## 2021-04-22 NOTE — TELEPHONE ENCOUNTER
Routing refill request to provider for review/approval because:  Drug not on the [facility] refill protocol           Pending Prescriptions:                       Disp   Refills    tiZANidine (ZANAFLEX) 4 MG tablet [Pharmac*30 tab*0        Sig: Take 1 tablet by mouth three times daily as needed           for muscle spasm        Christopher Shah RN

## 2021-05-10 ENCOUNTER — OFFICE VISIT (OUTPATIENT)
Dept: UROLOGY | Facility: CLINIC | Age: 71
End: 2021-05-10
Payer: COMMERCIAL

## 2021-05-10 VITALS
SYSTOLIC BLOOD PRESSURE: 168 MMHG | HEART RATE: 58 BPM | DIASTOLIC BLOOD PRESSURE: 84 MMHG | OXYGEN SATURATION: 98 % | TEMPERATURE: 97.1 F

## 2021-05-10 DIAGNOSIS — I10 HYPERTENSION GOAL BP (BLOOD PRESSURE) < 140/90: ICD-10-CM

## 2021-05-10 DIAGNOSIS — N13.8 HYPERTROPHY OF PROSTATE WITH URINARY OBSTRUCTION: ICD-10-CM

## 2021-05-10 DIAGNOSIS — R97.20 ELEVATED PROSTATE SPECIFIC ANTIGEN (PSA): Primary | ICD-10-CM

## 2021-05-10 DIAGNOSIS — N40.1 HYPERTROPHY OF PROSTATE WITH URINARY OBSTRUCTION: ICD-10-CM

## 2021-05-10 LAB — PSA SERPL-MCNC: 3.68 UG/L (ref 0–4)

## 2021-05-10 PROCEDURE — 99213 OFFICE O/P EST LOW 20 MIN: CPT | Mod: 25 | Performed by: UROLOGY

## 2021-05-10 PROCEDURE — 84153 ASSAY OF PSA TOTAL: CPT | Performed by: UROLOGY

## 2021-05-10 PROCEDURE — 51798 US URINE CAPACITY MEASURE: CPT | Performed by: UROLOGY

## 2021-05-10 PROCEDURE — 36415 COLL VENOUS BLD VENIPUNCTURE: CPT | Performed by: UROLOGY

## 2021-05-10 NOTE — PROGRESS NOTES
No chief complaint on file.      Steve José is a 71 year old male who presents today for follow up of   No chief complaint on file.   f/u benign prostatic hyperplasia/elevated psa.  He is s/p biopsy in 2017.  Prostate size was 70 cc.  He is on proscar/flomax.  He is doing well without any complaints.    Current Outpatient Medications   Medication Sig Dispense Refill     amLODIPine (NORVASC) 2.5 MG tablet Take 1 tablet (2.5 mg) by mouth daily 90 tablet 3     atenolol (TENORMIN) 25 MG tablet TAKE 2 TABLETS BY MOUTH IN THE MORNING AND 1 IN THE EVENING DAILY 270 tablet 2     famotidine (PEPCID) 10 MG tablet Take 10 mg by mouth 2 times daily       finasteride (PROSCAR) 5 MG tablet Take 1 tablet (5 mg) by mouth daily 90 tablet 3     hydrochlorothiazide (HYDRODIURIL) 25 MG tablet TAKE 1 TABLET BY MOUTH ONCE DAILY . APPOINTMENT REQUIRED FOR FUTURE REFILLS 90 tablet 3     Multiple Vitamins-Minerals (MULTI FOR HIM PO) Take  by mouth daily.       potassium chloride ER (KLOR-CON M) 10 MEQ CR tablet TAKE 1  BY MOUTH ONCE DAILY . APPOINTMENT REQUIRED FOR FUTURE REFILLS 90 tablet 3     simvastatin (ZOCOR) 10 MG tablet TAKE 1 TABLET BY MOUTH AT BEDTIME . APPOINTMENT REQUIRED FOR FUTURE REFILLS 90 tablet 3     tamsulosin (FLOMAX) 0.4 MG capsule Take 1 capsule (0.4 mg) by mouth At Bedtime 90 capsule 3     tiZANidine (ZANAFLEX) 4 MG tablet Take 1 tablet by mouth three times daily as needed for muscle spasm 30 tablet 0     No Known Allergies   Past Medical History:   Diagnosis Date     BPH (benign prostatic hypertrophy)      Chronic low back pain 2001     Colon polyp 2000     Hyperlipidemia      Hypertension      Prostatitis     recurrent     Past Surgical History:   Procedure Laterality Date     ABDOMEN SURGERY  1978    Right ureter reimplantation     APPENDECTOMY       BIOPSY  five times    Prostate     C GENITAL SURG PROC,MALE UNLISTED  2001    left hydrocele     C GENITAL SURG PROC,MALE UNLISTED  1977    right ureteral  reimplantation     COLONOSCOPY  1/6/2018     HRW CORNEAL TRANSPLANT, CRNA       LASIK  2005     TURP  1998    x 2     Family History   Problem Relation Age of Onset     C.A.D. Father 64        d. MI     Hypertension Father      Hypertension Mother      Cancer Mother         skin     Diabetes No family hx of      Cancer - colorectal No family hx of      Social History     Social History     Marital status:      Spouse name: Amy      Number of children: 0     Years of education: 15     Occupational History     printing company sales Retired     Social History Main Topics     Smoking status: Never Smoker     Smokeless tobacco: Never Used     Alcohol use 0.0 oz/week      Comment: rare     Drug use: No     Sexual activity: Yes     Partners: Female     Other Topics Concern      Service No     Blood Transfusions No     unsure     Caffeine Concern No     Occupational Exposure No     Hobby Hazards No     Sleep Concern No     Stress Concern No     Weight Concern No     Special Diet No     watches intake of salt and sugar     Back Care No     Exercise Yes     everyday goes for mile and a half to two mile walk     Bike Helmet No     Seat Belt Yes     Self-Exams Yes     Social History Narrative       REVIEW OF SYSTEMS  =================  C: NEGATIVE for fever, chills, change in weight  I: NEGATIVE for worrisome rashes, moles or lesions  E/M: NEGATIVE for ear, mouth and throat problems  R: NEGATIVE for significant cough or SHORTNESS OF BREATH,   CV: NEGATIVE for chest pain, palpitations or peripheral edema  GI: NEGATIVE for nausea, abdominal pain, heartburn, or change in bowel habits  NEURO: NEGATIVE any motor/sensory changes  PSYCH: NEGATIVE for recent mood disorder    Physical Exam:  BP (!) 168/84 (BP Location: Right arm, Patient Position: Chair, Cuff Size: Adult Large)   Pulse 58   Temp 97.1  F (36.2  C) (Tympanic)   SpO2 98%    Patient is pleasant, in no acute distress, good general condition.  Lung: no  evidence of respiratory distress    Abdomen: Soft, nondistended, non tender. No masses. No rebound or guarding.   Exam: no cva tenderness.  Penis no discharge. Testis no masses.  No scrotal skin lesion.  Prostate smooth.  Skin: Warm and dry.  No redness.  Psych: normal mood and affect  Neuro: alert and oriented    Assessment/Plan:   (R97.20) Elevated prostate specific antigen (PSA)  (primary encounter diagnosis)  Comment:    Plan: PSA tumor marker today    BPH: bladder scan < 50 ml                     Cont with proscar/flomax    HTN: Steve to follow up with Primary Care provider regarding elevated blood pressure.

## 2021-06-24 ENCOUNTER — OFFICE VISIT (OUTPATIENT)
Dept: FAMILY MEDICINE | Facility: CLINIC | Age: 71
End: 2021-06-24
Payer: COMMERCIAL

## 2021-06-24 VITALS
WEIGHT: 185.8 LBS | TEMPERATURE: 97.8 F | OXYGEN SATURATION: 99 % | HEIGHT: 72 IN | HEART RATE: 66 BPM | SYSTOLIC BLOOD PRESSURE: 150 MMHG | BODY MASS INDEX: 25.17 KG/M2 | DIASTOLIC BLOOD PRESSURE: 70 MMHG

## 2021-06-24 DIAGNOSIS — C44.310 BCC (BASAL CELL CARCINOMA), FACE: Primary | ICD-10-CM

## 2021-06-24 DIAGNOSIS — L30.9 DERMATITIS: ICD-10-CM

## 2021-06-24 PROCEDURE — 99213 OFFICE O/P EST LOW 20 MIN: CPT | Performed by: PHYSICIAN ASSISTANT

## 2021-06-24 ASSESSMENT — MIFFLIN-ST. JEOR: SCORE: 1625.78

## 2021-06-24 NOTE — PROGRESS NOTES
Assessment & Plan     BCC (basal cell carcinoma), face  - ADULT DERMATOLOGY REFERRAL; Future    Dermatitis  - Continue OTC Hydrocortisone          Return in about 4 weeks (around 7/22/2021) for Follow up per Dermatology recommendations.    DARNELL [facility] TATIANA [place]   Steve is a 71 year old who presents for the following health issues  accompanied by his self:    HPI     Patient presents with:  Derm Problem: pimple on nose since March  Rash: on abdomen x 2 weeks      Patient cannot recall any specific contact or new products.  Did note that the rash on his abdomen is improving with OTC hydrocortisone. Itching decreasing.     Review of Systems   Constitutional, HEENT, cardiovascular, pulmonary, gi and gu systems are negative, except as otherwise noted.      Objective    BP (!) 150/70   Pulse 66   Temp 97.8  F (36.6  C) (Oral)   Ht 1.829 m (6')   Wt 84.3 kg (185 lb 12.8 oz)   SpO2 99%   BMI 25.20 kg/m    Body mass index is 25.2 kg/m .  Physical Exam   GENERAL: healthy, alert and no distress  SKIN: Pearly papular lesion on nasal tip ~ 1 cm.  Scattered 1 mm papules on abdomen erythematous resolving.

## 2021-08-02 DIAGNOSIS — N13.8 HYPERTROPHY OF PROSTATE WITH URINARY OBSTRUCTION: ICD-10-CM

## 2021-08-02 DIAGNOSIS — N40.1 HYPERTROPHY OF PROSTATE WITH URINARY OBSTRUCTION: ICD-10-CM

## 2021-08-03 RX ORDER — FINASTERIDE 5 MG/1
5 TABLET, FILM COATED ORAL DAILY
Qty: 90 TABLET | Refills: 3 | Status: SHIPPED | OUTPATIENT
Start: 2021-08-03 | End: 2022-01-24

## 2021-08-03 NOTE — TELEPHONE ENCOUNTER
"Routing refill request to provider for review/approval because:  Blood pressure elevated    Requested Prescriptions   Pending Prescriptions Disp Refills     tamsulosin (FLOMAX) 0.4 MG capsule 90 capsule 3     Sig: Take 1 capsule (0.4 mg) by mouth At Bedtime       Alpha Blockers Failed - 8/2/2021  1:29 PM        Failed - Blood pressure under 140/90 in past 12 months     BP Readings from Last 3 Encounters:   06/24/21 (!) 150/70   05/10/21 (!) 168/84   02/08/21 138/88                 Passed - Recent (12 mo) or future (30 days) visit within the authorizing provider's specialty     Patient has had an office visit with the authorizing provider or a provider within the authorizing providers department within the previous 12 mos or has a future within next 30 days. See \"Patient Info\" tab in inbasket, or \"Choose Columns\" in Meds & Orders section of the refill encounter.              Passed - Patient does not have Tadalafil, Vardenafil, or Sildenafil on their medication list        Passed - Medication is active on med list        Passed - Patient is 18 years of age or older         Signed Prescriptions Disp Refills    finasteride (PROSCAR) 5 MG tablet 90 tablet 3     Sig: Take 1 tablet (5 mg) by mouth daily       BPH Agents Passed - 8/2/2021  1:29 PM        Passed - Recent (12 mo) or future (30 days) visit within the authorizing provider's department     Patient has had an office visit with the authorizing provider or a provider within the authorizing providers department within the previous 12 mos or has a future within next 30 days. See \"Patient Info\" tab in inbasket, or \"Choose Columns\" in Meds & Orders section of the refill encounter.              Passed - Medication is active on med list        Passed - Patient is 18 years of age or older               Mercedez Villagomez, RN, BSN [facility]          "

## 2021-08-09 RX ORDER — TAMSULOSIN HYDROCHLORIDE 0.4 MG/1
0.4 CAPSULE ORAL AT BEDTIME
Qty: 90 CAPSULE | Refills: 3 | Status: SHIPPED | OUTPATIENT
Start: 2021-08-09 | End: 2022-07-26

## 2021-09-09 DIAGNOSIS — M54.50 RIGHT LOW BACK PAIN, UNSPECIFIED CHRONICITY, UNSPECIFIED WHETHER SCIATICA PRESENT: ICD-10-CM

## 2021-09-26 ENCOUNTER — HEALTH MAINTENANCE LETTER (OUTPATIENT)
Age: 71
End: 2021-09-26

## 2021-10-07 ENCOUNTER — TRANSFERRED RECORDS (OUTPATIENT)
Dept: HEALTH INFORMATION MANAGEMENT | Facility: CLINIC | Age: 71
End: 2021-10-07
Payer: COMMERCIAL

## 2021-10-07 LAB — HEMOCCULT STL QL IA: NEGATIVE

## 2022-01-10 ENCOUNTER — OFFICE VISIT (OUTPATIENT)
Dept: FAMILY MEDICINE | Facility: CLINIC | Age: 72
End: 2022-01-10
Payer: COMMERCIAL

## 2022-01-10 VITALS
SYSTOLIC BLOOD PRESSURE: 130 MMHG | BODY MASS INDEX: 25.33 KG/M2 | HEART RATE: 69 BPM | DIASTOLIC BLOOD PRESSURE: 79 MMHG | OXYGEN SATURATION: 100 % | RESPIRATION RATE: 18 BRPM | HEIGHT: 72 IN | WEIGHT: 187 LBS | TEMPERATURE: 98.6 F

## 2022-01-10 DIAGNOSIS — K40.90 RIGHT INGUINAL HERNIA: Primary | ICD-10-CM

## 2022-01-10 PROCEDURE — 99213 OFFICE O/P EST LOW 20 MIN: CPT | Performed by: FAMILY MEDICINE

## 2022-01-10 RX ORDER — SIMVASTATIN 10 MG
TABLET ORAL
Qty: 90 TABLET | Refills: 3 | Status: CANCELLED | OUTPATIENT
Start: 2022-01-10

## 2022-01-10 RX ORDER — ATENOLOL 25 MG/1
TABLET ORAL
Qty: 270 TABLET | Refills: 2 | Status: CANCELLED | OUTPATIENT
Start: 2022-01-10

## 2022-01-10 RX ORDER — POTASSIUM CHLORIDE 750 MG/1
TABLET, EXTENDED RELEASE ORAL
Qty: 90 TABLET | Refills: 3 | Status: CANCELLED | OUTPATIENT
Start: 2022-01-10

## 2022-01-10 RX ORDER — HYDROCHLOROTHIAZIDE 25 MG/1
TABLET ORAL
Qty: 90 TABLET | Refills: 3 | Status: CANCELLED | OUTPATIENT
Start: 2022-01-10

## 2022-01-10 RX ORDER — AMLODIPINE BESYLATE 2.5 MG/1
2.5 TABLET ORAL DAILY
Qty: 90 TABLET | Refills: 3 | Status: CANCELLED | OUTPATIENT
Start: 2022-01-10

## 2022-01-10 ASSESSMENT — MIFFLIN-ST. JEOR: SCORE: 1631.23

## 2022-01-10 NOTE — PROGRESS NOTES
Assessment & Plan     Right inguinal hernia  Referral   - Adult General Surg Referral  Advised No heavy Lifting  See me if any painBMI:   Estimated body mass index is 25.36 kg/m  as calculated from the following:    Height as of this encounter: 1.829 m (6').    Weight as of this encounter: 84.8 kg (187 lb).           Return in about 3 weeks (around 1/31/2022) for Medicare wellness Exam.    Bita Deluca MD  [facility] [place]    Subjective   Steve is a 71 year old who presents for the following health issues     HPI   Chief Complaint   Patient presents with     Mass     on the rt side groin area,no pain,noticed amoth ago     Swelling Right side of Groin  No pain  It goes done  when he lies down on his back  No abdominal pain  Pt Exercises daily        Review of Systems   Rest of the ROS is Negative except see above and Problem list [stable]        Objective    /79   Pulse 69   Temp 98.6  F (37  C) (Oral)   Resp 18   Ht 1.829 m (6')   Wt 84.8 kg (187 lb)   SpO2 100%   BMI 25.36 kg/m    Body mass index is 25.36 kg/m .  Physical Exam   GENERAL: healthy, alert and no distress  ABDOMEN: soft, nontender, no hepatosplenomegaly, no masses and bowel sounds normal   (male): hernia Right Inguinal, reducible, no tenderness  and penis normal without urethral discharge

## 2022-01-11 ENCOUNTER — TELEPHONE (OUTPATIENT)
Dept: SURGERY | Facility: CLINIC | Age: 72
End: 2022-01-11

## 2022-01-11 ENCOUNTER — TELEPHONE (OUTPATIENT)
Dept: FAMILY MEDICINE | Facility: CLINIC | Age: 72
End: 2022-01-11

## 2022-01-11 ENCOUNTER — OFFICE VISIT (OUTPATIENT)
Dept: SURGERY | Facility: CLINIC | Age: 72
End: 2022-01-11
Payer: COMMERCIAL

## 2022-01-11 VITALS
DIASTOLIC BLOOD PRESSURE: 81 MMHG | BODY MASS INDEX: 25.5 KG/M2 | HEART RATE: 59 BPM | WEIGHT: 188 LBS | SYSTOLIC BLOOD PRESSURE: 162 MMHG

## 2022-01-11 DIAGNOSIS — K40.90 RIGHT INGUINAL HERNIA: Primary | ICD-10-CM

## 2022-01-11 PROCEDURE — 99203 OFFICE O/P NEW LOW 30 MIN: CPT | Performed by: SURGERY

## 2022-01-11 NOTE — TELEPHONE ENCOUNTER
Type of surgery: HERNIORRHAPHY INGUINAL OPEN RIGHT   CPT 59965  Right inguinal hernia K40.90    Location of surgery: [facility]  Date and time of surgery: 02/02/2022  Surgeon: BUBBA   Pre-Op Appt Date: 01/24/2022  Post-Op Appt Date: 02/18/2022   Packet sent out: Yes  Pre-cert/Authorization completed:  No prior auth required per [employer] online list.    Date: 1-11-22    Leann Alvarado  Prior Authorization Dept  [phone]

## 2022-01-11 NOTE — PROGRESS NOTES
Dear Bita Soto  I was asked to see this patient by Bita Deluca for please see below.  I have seen Stevemaggie José and as you know his chief complaint is right groin pain.  Patient has had a testicular hydrocele and had surgery to correct this and this is what it felt like at first.  But now seems to have a lump that goes back in when laying down. Has had some surgery on his right ureter from chronic kidney infections. Had open surgery for this.      HPI:  Patient is a 71 year old male  with complaints right groin discomfort.     The patient noticed the symptoms about 1 month ago.    Patient has not family history of hernia problems  Laying down makes the episode better.      Review Of Systems  Respiratory: No shortness of breath, dyspnea on exertion, cough, or hemoptysis  Cardiovascular: negative  Gastrointestinal: negative and constipation  Endocrine: negative  :  decreased urinary stream    10 Point review of systems all others are negative.   BP (!) 162/81   Pulse 59   Wt 85.3 kg (188 lb)   BMI 25.50 kg/m      Past Medical History:   Diagnosis Date     BPH (benign prostatic hypertrophy)      Chronic low back pain 2001     Colon polyp 2000     Hyperlipidemia      Hypertension      Prostatitis     recurrent       Past Surgical History:   Procedure Laterality Date     ABDOMEN SURGERY  1978    Right ureter reimplantation     APPENDECTOMY       BIOPSY  five times    Prostate     COLONOSCOPY  1/6/2018     [facility]W CORNEAL TRANSPLANT, CRNA       LASIK  2005     TURP  1998    x 2     [facility] GENITAL SURG PROC,MALE UNLISTED  2001    left hydrocele     [facility] GENITAL SURG PROC,MALE UNLISTED  1977    right ureteral reimplantation       Social History     Socioeconomic History     Marital status:      Spouse name: Amy      Number of children: 0     Years of education: 15     Highest education level: Not on file   Occupational History     Occupation: printing company sales     Employer: RETIRED   Tobacco Use      Smoking status: Never Smoker     Smokeless tobacco: Never Used   Substance and Sexual Activity     Alcohol use: Yes     Alcohol/week: 0.0 standard drinks     Comment: rare     Drug use: No     Sexual activity: Yes     Partners: Female     Birth control/protection: None   Other Topics Concern     Parent/sibling w/ CABG, MI or angioplasty before 65F 55M? No      Service No     Blood Transfusions No     Comment: unsure     Caffeine Concern No     Occupational Exposure No     Hobby Hazards No     Sleep Concern No     Stress Concern No     Weight Concern No     Special Diet No     Comment: watches intake of salt and sugar     Back Care No     Exercise Yes     Comment: everyday goes for mile and a half to two mile walk     Bike Helmet No     Seat Belt Yes     Self-Exams Yes   Social History Narrative     Not on file     Social Determinants of Health     Financial Resource Strain: Not on file   Food Insecurity: Not on file   Transportation Needs: Not on file   Physical Activity: Not on file   Stress: Not on file   Social Connections: Not on file   Intimate Partner Violence: Not on file   Housing Stability: Not on file       Current Outpatient Medications   Medication Sig Dispense Refill     amLODIPine (NORVASC) 2.5 MG tablet Take 1 tablet (2.5 mg) by mouth daily 90 tablet 3     atenolol (TENORMIN) 25 MG tablet TAKE 2 TABLETS BY MOUTH IN THE MORNING AND 1 IN THE EVENING DAILY 270 tablet 2     famotidine (PEPCID) 10 MG tablet Take 10 mg by mouth 2 times daily       finasteride (PROSCAR) 5 MG tablet Take 1 tablet (5 mg) by mouth daily 90 tablet 3     hydrochlorothiazide (HYDRODIURIL) 25 MG tablet TAKE 1 TABLET BY MOUTH ONCE DAILY . APPOINTMENT REQUIRED FOR FUTURE REFILLS 90 tablet 3     Multiple Vitamins-Minerals (MULTI FOR HIM PO) Take  by mouth daily.       potassium chloride ER (KLOR-CON M) 10 MEQ CR tablet TAKE 1  BY MOUTH ONCE DAILY . APPOINTMENT REQUIRED FOR FUTURE REFILLS 90 tablet 3     simvastatin (ZOCOR) 10  MG tablet TAKE 1 TABLET BY MOUTH AT BEDTIME . APPOINTMENT REQUIRED FOR FUTURE REFILLS 90 tablet 3     tamsulosin (FLOMAX) 0.4 MG capsule Take 1 capsule (0.4 mg) by mouth At Bedtime 90 capsule 3     tiZANidine (ZANAFLEX) 4 MG tablet Take 1 tablet by mouth three times daily as needed for muscle spasm 30 tablet 3       Above was reviewed  Physical exam: BP (!) 162/81   Pulse 59   Wt 85.3 kg (188 lb)   BMI 25.50 kg/m     Patient able to get up on table without difficulty.   Patient is alert and orientated.   Head eyes, nose and mouth within normal limits.  No supraclavicular or cervical adenopathy palpated.  Thyroid within normal limits.  No carotid bruits auscultated.  Lungs are clear to auscultation  Heart is regular rate and rhythm with no murmur or thrills noted.  No costal vertebral angle tenderness noted.  Abdomen is abdomen is soft without significant tenderness, masses, organomegaly or guarding  bowel sounds are positive and no caput medusa noted.  Has an obvious right inguinal hernia and a scar from his ureteral implant no left inguinal hernia palpated no umbilical hernias noted.      Skin was warm and pink  Normal Affect  Lower extremity edema is not present.      Assessment: right inguinal hernia and a scar from his ureteral implant surgery.  This will make it a little harder to see the ilioinguinal nerve.  So may get divided.      Plan to do left inguinal hernia repair with mesh open.    Risks of surgery include damage to nerves, bleeding, infection, damage to  Vessels, recurrence.  Although mesh is a better long term repair if it gets infected it must be removed.   If the patient has any bacterial infection the week before and is seen by their doctor and started on antibiotics, I can probably still do the surgery if they are vastly improved by the time of surgery, but if the infection starts closer to the surgery date it will be better to cancel and reschedule to a later date.  A cough will also be hard  on the repair and uncomfortable post operative.  If the patient is a smoker I did discuss increase risk of recurrence and more pain with the cough.  If the patient is willing to quit smoking would encourage to do so and start at least a week before surgery.  However, if patient is not going to quit then must understand that his repair is more likely to fail.    Risks of surgery discussed including, but not limited to bleeding, infection, recurrence, damage to nerves and what is in the hernia sac.  Risks of anesthesia also discussed.    Discussed massaging hernia back in and using ice if becomes more painful.  If not able to reduce then go to emergency room.  Also discussed hernia belt to use until able to get in for surgery.    Time spent with the patient with greater that 50% of the time in discussion was 30 minutes.  In discussing the situation.      Phu Melo MD

## 2022-01-11 NOTE — PATIENT INSTRUCTIONS
Assessment: right inguinal hernia and a scar from his ureteral implant surgery.  This will make it a little harder to see the ilioinguinal nerve.  So may get divided.      Plan to do left inguinal hernia repair with mesh open.    Risks of surgery include damage to nerves, bleeding, infection, damage to  Vessels, recurrence.  Although mesh is a better long term repair if it gets infected it must be removed.   If the patient has any bacterial infection the week before and is seen by their doctor and started on antibiotics, I can probably still do the surgery if they are vastly improved by the time of surgery, but if the infection starts closer to the surgery date it will be better to cancel and reschedule to a later date.  A cough will also be hard on the repair and uncomfortable post operative.  If the patient is a smoker I did discuss increase risk of recurrence and more pain with the cough.  If the patient is willing to quit smoking would encourage to do so and start at least a week before surgery.  However, if patient is not going to quit then must understand that his repair is more likely to fail.    Risks of surgery discussed including, but not limited to bleeding, infection, recurrence, damage to nerves and what is in the hernia sac.  Risks of anesthesia also discussed.    Discussed massaging hernia back in and using ice if becomes more painful.  If not able to reduce then go to emergency room.  Also discussed hernia belt to use until able to get in for surgery.      HERNIORRHAPHY DISCHARGE INSTRUCTIONS  DR. ART MAC    1. You may resume your regular diet when you feel you are ready to. DO NOT drink alcoholic beverages for 24 hours or while you are taking prescription medication.    2. Limit your activities for the first 48 hours. Gradually, increase them as tolerated. You may use stairs. I encourage you to walk as tolerated. No lifting greater that 20 pounds for 3 weeks.    3. You will have some  discomfort at the incision sites. This is expected. This should improve over the next 2-3 days. Ice and pain medication will help with this pain. Use prescribed pain medication as instructed.    4. Bruising and mild swelling is normal after surgery. For males it is common to have bruising going into the penis and scrotum. The area below and around the incision(s) will be hard and elevated. This is normal. I call it the healing ridge. This will resolve slowly over the next several months. If you feel the pain is increasing and cannot explain it by increasing activity please call us at [phone].    5. The dressing will often have some blood on it. You may shower 24 hours after surgery. No baths for 2 weeks after surgery. Clean gently over incision site. If clear plastic covering or steri-strip comes off and there is still some bleeding or drainage then cover with gauze or band-aid. If no bleeding, there is no reason to cover site. The abdominal binder may be removed after 24 hours after surgery. You may continue to wear it however for comfort. I suggest  you wear an old t-shirt under the abdominal binder for a more comfortable wear.    6. Avoid Aspirin for the first 72 hours after the procedure. This medication may increase the tendency to bleed.    7. Use the following medications (in addition to your normal meds) as shown:  a. Percocet 5 mg 1-2 every 6 hours as needed for severe pain. This contains 325 mg of Tylenol (acetaminophen) per tablet.  Please do not take more than 4 grams of Tylenol (acetaminophen) per day. For example, you may take 1 Percocet and 1 Tylenol, or 2 Percocet and no Tylenol, or 2 Tylenol and no Percocet every 6 hours.  b. Tylenol (acetaminophen) 500 mg every 6 hours as needed for mild pain. Do not take more than 1000 mg every 6 hours. (see above).  c. Motrin (ibuprofen) 200-800 mg every 6 hours as needed for mild to moderate pain. Take with food.     8. Notify Dr. Melo's clinic at  [phone] if:    Your discomfort is not relieved by your pain medication.    You have signs of infection such as temperature above 100.5 degrees orally, chills, or increasing daily discomfort.    Incision site is becoming more red and/or there is purulent drainage.    You have questions or concerns.    9. Please call [phone] to schedule a follow up appointment in about 2 weeks.    10. When taking narcotics (pain medication more than Tylenol [acetaminophen] and Motrin [ibuprofen]) it is important to keep your stools soft to avoid constipation and pain with straining. This is best done by drinking fluids (non-alcoholic and non-caffeinated) and taking a stool softener (i.e. Metamucil or milk of magnesia). You may be able to use non-narcotics for pain relief especially by the 3rd post- operative day. Tylenol (acetaminophen) 500 mg every 6 hours and/or Motrin (ibuprofen) 200-800 mg every 6 hours. Please do not take more than 4 grams of Tylenol (acetaminophen) per day. Remember your Percocet does have Tylenol (acetaminophen) already in it. Please take Motrin (ibuprofen) with food to help protect the stomach. If you have a history of stomach ulcers or stomach problems, do not take Motrin (ibuprofen).     11. Do not drive or operate heavy machinery for 24 hours after surgery or when taking narcotics. You may resume driving when feel that you can safely avoid an accident and are not taking narcotics. This is usually 5 to 7 days after surgery. You should not be alone for 24 hours after surgery.    12. Have milk of magnesia available at home so that when you take the pain medications you take 1-2 tablepoons a day, to help reduce problems with constipation.      13. If you have questions after your procedure/surgery please contact Dr Melo's primary clinic in [place]. You can call [phone], your other option is to send us a [employer] message through your [employer] portal to Dr Melo's team. For urgent and  non urgent matters these options are best. If your symptoms are emergent or cannot wait, please proceed to [facility] and let them know who you had surgery with.

## 2022-01-11 NOTE — LETTER
1/11/2022         RE: Steve José  [address] [place] [address]        Dear Colleague,    Thank you for referring your patient, Steve José, to the [facility]. Please see a copy of my visit note below.    Dear Bita Soto  I was asked to see this patient by Bita Deluca for please see below.  I have seen Steve José and as you know his chief complaint is right groin pain.  Patient has had a testicular hydrocele and had surgery to correct this and this is what it felt like at first.  But now seems to have a lump that goes back in when laying down. Has had some surgery on his right ureter from chronic kidney infections. Had open surgery for this.      HPI:  Patient is a 71 year old male  with complaints right groin discomfort.     The patient noticed the symptoms about 1 month ago.    Patient has not family history of hernia problems  Laying down makes the episode better.      Review Of Systems  Respiratory: No shortness of breath, dyspnea on exertion, cough, or hemoptysis  Cardiovascular: negative  Gastrointestinal: negative and constipation  Endocrine: negative  :  decreased urinary stream    10 Point review of systems all others are negative.   BP (!) 162/81   Pulse 59   Wt 85.3 kg (188 lb)   BMI 25.50 kg/m      Past Medical History:   Diagnosis Date     BPH (benign prostatic hypertrophy)      Chronic low back pain 2001     Colon polyp 2000     Hyperlipidemia      Hypertension      Prostatitis     recurrent       Past Surgical History:   Procedure Laterality Date     ABDOMEN SURGERY  1978    Right ureter reimplantation     APPENDECTOMY       BIOPSY  five times    Prostate     COLONOSCOPY  1/6/2018     HRW CORNEAL TRANSPLANT, CRNA       LASIK  2005     TURP  1998    x 2     [facility] GENITAL SURG PROC,MALE UNLISTED  2001    left hydrocele     [facility] GENITAL SURG PROC,MALE UNLISTED  1977    right ureteral reimplantation       Social History     Socioeconomic  History     Marital status:      Spouse name: Amy      Number of children: 0     Years of education: 15     Highest education level: Not on file   Occupational History     Occupation: printing company sales     Employer: RETIRED   Tobacco Use     Smoking status: Never Smoker     Smokeless tobacco: Never Used   Substance and Sexual Activity     Alcohol use: Yes     Alcohol/week: 0.0 standard drinks     Comment: rare     Drug use: No     Sexual activity: Yes     Partners: Female     Birth control/protection: None   Other Topics Concern     Parent/sibling w/ CABG, MI or angioplasty before 65F 55M? No      Service No     Blood Transfusions No     Comment: unsure     Caffeine Concern No     Occupational Exposure No     Hobby Hazards No     Sleep Concern No     Stress Concern No     Weight Concern No     Special Diet No     Comment: watches intake of salt and sugar     Back Care No     Exercise Yes     Comment: everyday goes for mile and a half to two mile walk     Bike Helmet No     Seat Belt Yes     Self-Exams Yes   Social History Narrative     Not on file     Social Determinants of Health     Financial Resource Strain: Not on file   Food Insecurity: Not on file   Transportation Needs: Not on file   Physical Activity: Not on file   Stress: Not on file   Social Connections: Not on file   Intimate Partner Violence: Not on file   Housing Stability: Not on file       Current Outpatient Medications   Medication Sig Dispense Refill     amLODIPine (NORVASC) 2.5 MG tablet Take 1 tablet (2.5 mg) by mouth daily 90 tablet 3     atenolol (TENORMIN) 25 MG tablet TAKE 2 TABLETS BY MOUTH IN THE MORNING AND 1 IN THE EVENING DAILY 270 tablet 2     famotidine (PEPCID) 10 MG tablet Take 10 mg by mouth 2 times daily       finasteride (PROSCAR) 5 MG tablet Take 1 tablet (5 mg) by mouth daily 90 tablet 3     hydrochlorothiazide (HYDRODIURIL) 25 MG tablet TAKE 1 TABLET BY MOUTH ONCE DAILY . APPOINTMENT REQUIRED FOR FUTURE  REFILLS 90 tablet 3     Multiple Vitamins-Minerals (MULTI FOR HIM PO) Take  by mouth daily.       potassium chloride ER (KLOR-CON M) 10 MEQ CR tablet TAKE 1  BY MOUTH ONCE DAILY . APPOINTMENT REQUIRED FOR FUTURE REFILLS 90 tablet 3     simvastatin (ZOCOR) 10 MG tablet TAKE 1 TABLET BY MOUTH AT BEDTIME . APPOINTMENT REQUIRED FOR FUTURE REFILLS 90 tablet 3     tamsulosin (FLOMAX) 0.4 MG capsule Take 1 capsule (0.4 mg) by mouth At Bedtime 90 capsule 3     tiZANidine (ZANAFLEX) 4 MG tablet Take 1 tablet by mouth three times daily as needed for muscle spasm 30 tablet 3       Above was reviewed  Physical exam: BP (!) 162/81   Pulse 59   Wt 85.3 kg (188 lb)   BMI 25.50 kg/m     Patient able to get up on table without difficulty.   Patient is alert and orientated.   Head eyes, nose and mouth within normal limits.  No supraclavicular or cervical adenopathy palpated.  Thyroid within normal limits.  No carotid bruits auscultated.  Lungs are clear to auscultation  Heart is regular rate and rhythm with no murmur or thrills noted.  No costal vertebral angle tenderness noted.  Abdomen is abdomen is soft without significant tenderness, masses, organomegaly or guarding  bowel sounds are positive and no caput medusa noted.  Has an obvious right inguinal hernia and a scar from his ureteral implant no left inguinal hernia palpated no umbilical hernias noted.      Skin was warm and pink  Normal Affect  Lower extremity edema is not present.      Assessment: right inguinal hernia and a scar from his ureteral implant surgery.  This will make it a little harder to see the ilioinguinal nerve.  So may get divided.      Plan to do left inguinal hernia repair with mesh open.    Risks of surgery include damage to nerves, bleeding, infection, damage to  Vessels, recurrence.  Although mesh is a better long term repair if it gets infected it must be removed.   If the patient has any bacterial infection the week before and is seen by their doctor  and started on antibiotics, I can probably still do the surgery if they are vastly improved by the time of surgery, but if the infection starts closer to the surgery date it will be better to cancel and reschedule to a later date.  A cough will also be hard on the repair and uncomfortable post operative.  If the patient is a smoker I did discuss increase risk of recurrence and more pain with the cough.  If the patient is willing to quit smoking would encourage to do so and start at least a week before surgery.  However, if patient is not going to quit then must understand that his repair is more likely to fail.    Risks of surgery discussed including, but not limited to bleeding, infection, recurrence, damage to nerves and what is in the hernia sac.  Risks of anesthesia also discussed.    Discussed massaging hernia back in and using ice if becomes more painful.  If not able to reduce then go to emergency room.  Also discussed hernia belt to use until able to get in for surgery.    Time spent with the patient with greater that 50% of the time in discussion was 30 minutes.  In discussing the situation.      Phu Melo MD        Again, thank you for allowing me to participate in the care of your patient.        Sincerely,        Phu Melo MD

## 2022-01-11 NOTE — TELEPHONE ENCOUNTER
Reason for Call:  Other : questions     Detailed comments: Steve had an appointment with you today and has had a chance to look at the material that was given. He has had past urinary retention and issues with past surgeries and wanted to talk to you about this.     Phone Number Patient can be reached at: Home number on file [phone] (home)    Best Time: any     Can we leave a detailed message on this number? YES    Call taken on 1/11/2022 at 2:10 PM by Tiffanie Ball

## 2022-01-12 ENCOUNTER — MYC MEDICAL ADVICE (OUTPATIENT)
Dept: SURGERY | Facility: CLINIC | Age: 72
End: 2022-01-12
Payer: COMMERCIAL

## 2022-01-18 NOTE — TELEPHONE ENCOUNTER
Klever Wilson, please remind me about your bladder retention at time of surgery.  We can send you home with a batres and leg and bed bag.   Yes this can be a problem especially with a larger hernia repair.   See you soon.   Phu Melo MD     [facility] ambulatory encounter  [facility]  CONSULTATION    REFERRING PROVIDER:  Serina Billigns MD    HISTORY OF PRESENT ILLNESS:  This patient is seen at the request of Serina Billings MD for consultation for a left breast palpable finding. Patient is a 26 year old White  female who was noted to have a palpable finding in the left breast.  She states that the mass has been present for 2 months.  It has not changed in size. The patient states that approximately 2 months ago she noticed there was a lump that was close to the left armpit within the left  breast about the size of a jelly pain. She stated that it was mobile and nontender, and at times she had a hard time finding it. She states however, that it feels different than the rest of her breast tissue. She denies any skin changes and it has not changed in size since she first noticed it. It is otherwise not bothersome to her.    Breast imaging was performed and this has been interpreted as benign. On 11/27/2018 the patient went a bilateral diagnostic mammogram and a left breast ultrasound which showed heterogeneously dense breast tissue. Initially ultrasound scans were obtained of the palpable lump in the left upper outer quadrant, and no definable mass was identified, with no cysts nor architectural distortion, with no abnormal lymph nodes in the left axilla. Because no sonographic abnormality was seen to correlate with the palpable lump diagnostic mammogram was performed which showed heterogeneously dense breast tissue. There were scattered calcifications with benign punctate morphology felt to be related to the patient's prior surgery breast reduction. No underlying mass nor suspicious calcifications to the area of the possible palpable lump with unremarkable axillary regions. This was interpreted as benign, BI-RADS Category 2.    The patient has not not had any other specific breast complaints.  She has had no breast pain.  There  have been no skin changes.  There has been no nipple discharge.   The patient has no family history of ovarian cancer. Her paternal grandmother was diagnosed breast cancer in her 60s or 70s. Her father had nonmetastatic prostate cancer at age 60. The patient denies any significant past medical history. She states she is otherwise healthy. Her past medical surgical history significant for a bilateral reduction mammoplasty in  which was unremarkable, performed in [place]. She works as a  for [employer].    The patient presents today for a left breast new palpable finding      GENERAL BREAST HISTORY:  Age at menarche: 14  Last menstrual period: 16  : 0. Para: 0. Miscarriages: 0. Abortions: 0.  Age at first completed pregnancy: n/a.  Breast-feeding history: n/a.  History of oral contraceptives: yes, 7-8 years.  History of hormone replacement therapy or fertility assistance: no.  Previous Breast Biopsies or Surgeries: - Bilateral reduction mammoplasty  Ethnicity:  Macedonian.    PERTINENT BREAST IMAGING:  I personally reviewed the relevant images for this patient and concur with the radiologist's impression.    EXAM:  MAMM0 DIAGNOSTIC BILATERAL, US BREAST LIMITED 1-3 QUADRANTS LEFT     DATE OF EXAM:  2018 1:42 PM.     COMPARISON EXAMS:  She has never had a prior mammogram.     CLINICAL INDICATION:  26-year-old who notes a 4-5 week history of a been sized movable lump in the left breast upper outer quadrant. This is nontender. She also has a history of prior reduction mammoplasties performed in      TECHNIQUE:  MAMM0 DIAGNOSTIC BILATERAL, US BREAST LIMITED 1-3 QUADRANTS LEFT.      DENSITY:  The breasts are heterogeneously dense which may obscure small masses.      ULTRASOUND FINDINGS: Initially, ultrasound scans were obtained over the palpable lump in the left upper outer quadrant. No definable mass is identified. There is no cyst, nor architectural distortion. No abnormal  lymph nodes are seen in the left axilla.       Because no sonographic abnormality was seen to correlate with the palpable lump, a diagnostic mammogram was performed     MAMMO FINDINGS:  Mammograms show heterogeneously dense breasts. Scattered calcifications are seen in the anterior aspects of both breasts near the nipple.  On the left magnification views were obtained demonstrating benign  punctate morphology. These are felt to be related to the patient's prior history of breast reduction surgery.        The palpable lump was marked with the triangle marker. There is no underlying mammographic mass, nor suspicious microcalcifications. Bilaterally the axillary regions are unremarkable.     IMPRESSION:  Benign imaging findings. There is no sonographic, no mammographic abnormality correlating with the palpable lump in the left upper outer quadrant     RECOMMENDATIONS:  Clinical follow-up for this palpable lump is recommended.  These results were personally discussed with the patient.     BI-RADS:  2 - Benign.         PAST MEDICAL HISTORY:  Past Surgical History:   Procedure Laterality Date   • Breast reduction 3 hrs     • Tonsillectomy and adenoidectomy       Past Medical History:   Diagnosis Date   • Abnormal Pap smear of cervix 06/07/2012    ASC-US   • Eczema        Current Outpatient Medications   Medication Sig Dispense Refill   • norethindrone-ethinyl estradiol and ferrous fumarate 1-20 MG-MCG(24) tablet Take 1 tablet by mouth daily. 84 tablet 4     No current facility-administered medications for this visit.        ALLERGIES:   Allergen Reactions   • Sulfa Antibiotics HIVES       Social History     Socioeconomic History   • Marital status: /Civil Union     Spouse name: None   • Number of children: None   • Years of education: None   • Highest education level: None   Social Needs   • Financial resource strain: None   • Food insecurity - worry: None   • Food insecurity - inability: None   • Transportation  needs - medical: None   • Transportation needs - non-medical: None   Occupational History   • Occupation: - tax     Employer: [employer]    Tobacco Use   • Smoking status: Never Smoker   • Smokeless tobacco: Never Used   Substance and Sexual Activity   • Alcohol use: Yes     Alcohol/week: 4.8 oz     Types: 8 Standard drinks or equivalent per week   • Drug use: No   • Sexual activity: Yes     Partners: Male     Birth control/protection: OCP   Other Topics Concern   • None   Social History Narrative   • None   Kailee is  and works as  with [employer].  She drinks 1-12 oz caffeinated beverages daily.      Family History   Problem Relation Age of Onset   • High blood pressure Father    • Cancer, Prostate Father 60   • High cholesterol Mother    • Cancer, Breast Paternal Grandmother         older onset- 60-70s     1 Brothers.  0 Sisters. 0 Maternal Aunts. 2 Maternal Uncles. 1 Paternal Aunts. 1 Paternal Uncles.        There is no family history of diabetes, excess hypertension, heart disease, problems with general anesthesia.      REVIEW OF SYSTEMS:  Constitutional: The patient has no fevers, chills, or recent weight gain or loss.  Eyes: No double vision or blurred vision. No history of glaucoma.Glasses/contacts, Last eye exam:July, 2018  Ears, Nose and Throat: The patient has no sinus problems, dentures, bleeding gums, sore throat, or new hoarseness.  Cardiac: No new chest pain or pressure. No waking at night short of breath. The patient has never had a heart attack.  Pulmonary: No new shortness of breath, cough or sputum production. No history of emphysema or asthma. Can walk a block and take a flight of stairs  Gastrointestinal: No nausea, vomiting, abdominal pain, change in bowel habits or blood in the stool. No history of jaundice or hepatitis.  Genitourinary: No burning on urination. No blood in the urine. No history of kidney disease.Last pelvic exam:2/18-  normal  Neurological: No history of stroke, seizure, unilateral numbness or weakness, history of depression or loss of memory. Anxiety  Musculoskeletal: No new bone pain, muscle weakness or joint discomfort.  Skin: No rash, unusual pigmentation, or history of skin cancer.  Hematologic/Lymphatic: No history of easy bruising. No bleeding problems. No swollen or enlarged lymph nodes.   Endocrine: No history of thyroid dysfunction. No history of diabetes.  Menstrual History: The patient is premenopausal. No unusual menstrual complaints.      PHYSICAL EXAMINATION:  Vital Signs:   Vitals:    12/10/18 0903   BP: 123/79   Pulse: 72   Resp: 12   Temp: 98.3 °F (36.8 °C)   TempSrc: Oral   SpO2: 99%   Weight: 63 kg   Height: 5' 7\" (1.702 m)     General: The patient is well-developed and well-nourished. She is in no acute distress.  Eyes: Pupils equally round and reactive to light.  Ears, Nose and Throat: No oral mucosal lesions. Thyroid is not enlarged. Trachea is midline.  Pulmonary: Clear to auscultation and percussion. No wheezing. No rhonchi.  Cardiac: Regular rhythm. No gallops or murmurs are appreciated.  Gastrointestinal: Normal active bowel sounds. Liver is not palpable. No masses are appreciated.  Musculoskeletal: No axial skeletal tenderness. No costovertebral angle tenderness.  Extremities: No clubbing or cyanosis.  Neurological: Oriented x3. Normal muscle strength. Sensory is intact.  Breasts: A bilateral breast examination was performed in both the sitting and supine positions. Approximate C cup. Grade 1 ptosis.well healed inverted T mammaplasty incisions. Very nodular breast tissue bilaterally, symmetric.   Right: Normal contour. No retraction or dimpling of the skin or nipple. No dominant mass.  Left: Normal contour. No retraction or dimpling of the skin or nipple. There is an area of fullness at the left breast high upper outer quadrant/axillary tail which measures approximately 2cm, and is quite firm, mobile,  not fixed, nontender. There is a relatively distinct lateral border, but very indistinct medially. Possibly an island of very dense breast tissue. No overlying skin changes. No additional dominant masses, however, there are other areas of nodularity consistent with dense glandular breast tissue.  Lymph Nodes: No cervical, supraclavicular, or axillary adenopathy.      IMPRESSION:  26-year-old female with new palpable finding within the upper outer quadrant of the left breast; benign breast imaging    Family history of breast cancer in paternal grandmother    Problem List Items Addressed This Visit     None      Visit Diagnoses     Breast lump on left side at 2 o'clock position    -  Primary    Relevant Orders    MRI BREAST BILATERAL    History of bilateral breast reduction surgery        Relevant Orders    MRI BREAST BILATERAL    Persistent nodularity of breast        Relevant Orders    MRI BREAST BILATERAL    Family history of breast cancer in female        Relevant Orders    MRI BREAST BILATERAL    Increased risk of breast cancer        Relevant Orders    MRI BREAST BILATERAL    Dense breasts        Relevant Orders    MRI BREAST BILATERAL          RECOMMENDATIONS:  I reviewed the breast imaging and clinical findings with the patient.  I discussed with her that the possible clinical differential of her left breast physical findings may include dense fibroglandular breast tissue, scar tissue/fat necrosis from her previous breast reduction, fibroadenoma, or a mammographically/sonographically occult carcinoma (which would be very unlikely). I discussed with the patient that most breast palpable findings in young women are are benign, non-cancerous areas in the the breast.      Options for management were reviewed with the patient.  This included close observation to include a repeat physical examination in 3 months, MRI of the breasts to help better characterize the palpable area, percutaneous palpation guided guided  needle biopsy, and surgical excision of this area.    We also discussed her family history of breast cancer as well as her heterogeneously dense breast tissue. High risk options were dicussed in detail with the patient.  Using the MAYITO model, her risk of developing breast cancer was calculated to be 0.6% in the next 10 years and 21.3% over her lifetime.  High-risk surveillance was discussed to include monthly self-breast exams, biannual clinical breast exams with her primary care doctor, OB/GYN provider or with us here at the [facility], as well as yearly mammograms starting at age 40 is an option.  Also, we discussed that a yearly breast MRI could be added if indicated as a screening tool.          Hormonal preventive therapy was considered, and it was reviewed that this may decrease her risk of developing breast cancer. The benefits and risks of hormonal medication were reviewed. Given her young age, we did not pursue a discussion of endocrine therapy at this time.    Given the left breast palpable area, and given her overall risk of breast cancer based on her MAYITO score, the patient has decided to proceed with MRI of the breasts. It is somewhat controversial on whether or not to proceed with a bilateral breast MRI, in that MRI may have a superior specificity to mammography or sonography to detect cancers, but the downside to breast MRI is that there may be false positives associated, which can result in further workup, more biopsies or recommended short-term follow up with additional breast MRIs. After discussing the pros and cons of proceeding with breast MRI, the patient does agree to this study; it will be precertified and scheduled.    If the MRI is abnormal, we will proceed with percutaneous biopsy/excision. Alternatively, if the MRI is normal, we will plan to follow her closely with clinical examination in 3 months. We also discussed that if clinically appropriate, or if the lesion changes,  biopsy and/or excision of this area would be recommended.    I reviewed ongoing breast awareness with the patient. I discussed that if the left breast palpable finding changes (i.e. enlarges, becomes tender, skin changes, etc) or should she identify a new mass, lump, thickening, or hardening in the breasts, that she should seek medical attention. Should she not have any of these findings,I will plan to see her back in 3 months to reassess this area.     The patient indicated understanding of the diagnosis and agreed with the plan of care. They are encouraged to call our office with any additional questions or concerns.    60 minutes were spent with the patient, >50% of which were spent in education, counseling and coordination of care.    Instructions provided as documented in the After Visit Summary.    I wish to thank Serina Billings MD for the privilege of being able to participate in the evaluation and care of this patient.    Chidi Doshi MD  12/10/18    CC: Serina Billings MD

## 2022-01-20 DIAGNOSIS — Z11.59 ENCOUNTER FOR SCREENING FOR OTHER VIRAL DISEASES: Primary | ICD-10-CM

## 2022-01-24 ENCOUNTER — OFFICE VISIT (OUTPATIENT)
Dept: FAMILY MEDICINE | Facility: CLINIC | Age: 72
End: 2022-01-24
Payer: COMMERCIAL

## 2022-01-24 VITALS
SYSTOLIC BLOOD PRESSURE: 138 MMHG | TEMPERATURE: 97.6 F | HEIGHT: 72 IN | OXYGEN SATURATION: 100 % | WEIGHT: 185 LBS | BODY MASS INDEX: 25.06 KG/M2 | HEART RATE: 78 BPM | DIASTOLIC BLOOD PRESSURE: 88 MMHG | RESPIRATION RATE: 18 BRPM

## 2022-01-24 DIAGNOSIS — Z01.818 PRE-OP EXAM: Primary | ICD-10-CM

## 2022-01-24 DIAGNOSIS — E78.5 HYPERLIPIDEMIA LDL GOAL <130: ICD-10-CM

## 2022-01-24 DIAGNOSIS — N40.1 HYPERTROPHY OF PROSTATE WITH URINARY OBSTRUCTION: ICD-10-CM

## 2022-01-24 DIAGNOSIS — I10 HYPERTENSION GOAL BP (BLOOD PRESSURE) < 140/90: ICD-10-CM

## 2022-01-24 DIAGNOSIS — N13.8 HYPERTROPHY OF PROSTATE WITH URINARY OBSTRUCTION: ICD-10-CM

## 2022-01-24 LAB
ANION GAP SERPL CALCULATED.3IONS-SCNC: 4 MMOL/L (ref 3–14)
BUN SERPL-MCNC: 24 MG/DL (ref 7–30)
CALCIUM SERPL-MCNC: 9.2 MG/DL (ref 8.5–10.1)
CHLORIDE BLD-SCNC: 103 MMOL/L (ref 94–109)
CHOLEST SERPL-MCNC: 137 MG/DL
CO2 SERPL-SCNC: 34 MMOL/L (ref 20–32)
CREAT SERPL-MCNC: 0.78 MG/DL (ref 0.66–1.25)
FASTING STATUS PATIENT QL REPORTED: YES
GFR SERPL CREATININE-BSD FRML MDRD: >90 ML/MIN/1.73M2
GLUCOSE BLD-MCNC: 113 MG/DL (ref 70–99)
HDLC SERPL-MCNC: 63 MG/DL
HGB BLD-MCNC: 13.3 G/DL (ref 13.3–17.7)
LDLC SERPL CALC-MCNC: 62 MG/DL
NONHDLC SERPL-MCNC: 74 MG/DL
POTASSIUM BLD-SCNC: 4.2 MMOL/L (ref 3.4–5.3)
SODIUM SERPL-SCNC: 141 MMOL/L (ref 133–144)
TRIGL SERPL-MCNC: 58 MG/DL

## 2022-01-24 PROCEDURE — 80048 BASIC METABOLIC PNL TOTAL CA: CPT | Performed by: FAMILY MEDICINE

## 2022-01-24 PROCEDURE — 85018 HEMOGLOBIN: CPT | Performed by: FAMILY MEDICINE

## 2022-01-24 PROCEDURE — 36415 COLL VENOUS BLD VENIPUNCTURE: CPT | Performed by: FAMILY MEDICINE

## 2022-01-24 PROCEDURE — 80061 LIPID PANEL: CPT | Performed by: FAMILY MEDICINE

## 2022-01-24 PROCEDURE — 93000 ELECTROCARDIOGRAM COMPLETE: CPT | Performed by: FAMILY MEDICINE

## 2022-01-24 PROCEDURE — 99214 OFFICE O/P EST MOD 30 MIN: CPT | Performed by: FAMILY MEDICINE

## 2022-01-24 RX ORDER — POTASSIUM CHLORIDE 750 MG/1
TABLET, EXTENDED RELEASE ORAL
Qty: 90 TABLET | Refills: 3 | Status: SHIPPED | OUTPATIENT
Start: 2022-01-24 | End: 2023-01-17

## 2022-01-24 RX ORDER — ATENOLOL 25 MG/1
TABLET ORAL
Qty: 270 TABLET | Refills: 2 | Status: SHIPPED | OUTPATIENT
Start: 2022-01-24 | End: 2022-10-20

## 2022-01-24 RX ORDER — FINASTERIDE 5 MG/1
5 TABLET, FILM COATED ORAL DAILY
Qty: 90 TABLET | Refills: 3 | Status: SHIPPED | OUTPATIENT
Start: 2022-01-24 | End: 2023-01-17

## 2022-01-24 RX ORDER — AMLODIPINE BESYLATE 2.5 MG/1
2.5 TABLET ORAL DAILY
Qty: 90 TABLET | Refills: 3 | Status: SHIPPED | OUTPATIENT
Start: 2022-01-24 | End: 2023-01-17

## 2022-01-24 RX ORDER — HYDROCHLOROTHIAZIDE 25 MG/1
TABLET ORAL
Qty: 90 TABLET | Refills: 3 | Status: SHIPPED | OUTPATIENT
Start: 2022-01-24 | End: 2023-04-10

## 2022-01-24 RX ORDER — SIMVASTATIN 10 MG
TABLET ORAL
Qty: 90 TABLET | Refills: 3 | Status: SHIPPED | OUTPATIENT
Start: 2022-01-24 | End: 2023-03-27

## 2022-01-24 ASSESSMENT — MIFFLIN-ST. JEOR: SCORE: 1622.15

## 2022-01-24 NOTE — PROGRESS NOTES
[facility]  [address]  [place] [address]  Phone: [phone]  Primary Provider: Bita Henry  Pre-op Performing Provider: BITA HENRY      PREOPERATIVE EVALUATION:  Today's date: 1/24/2022    Stevemaggie José is a 71 year old male who presents for a preoperative evaluation.    Surgical Information:  Surgery/Procedure: HERNIORRHAPHY, INGUINAL, OPEN  Surgery Location: [facility]   Surgeon: Dr Hahn  Surgery Date: 2/2/22  Time of Surgery: 10:25  Where patient plans to recover: At home with family  Fax number for surgical facility: Note does not need to be faxed, will be available electronically in Epic.    Type of Anesthesia Anticipated: to be determined    Assessment & Plan     The proposed surgical procedure is considered INTERMEDIATE risk.    Pre-op exam    - EKG 12-lead complete w/read - Clinics  - Hemoglobin; Future  - Hemoglobin    Hypertension goal BP (blood pressure) < 140/90  Stable   - amLODIPine (NORVASC) 2.5 MG tablet; Take 1 tablet (2.5 mg) by mouth daily  - atenolol (TENORMIN) 25 MG tablet; TAKE 2 TABLETS BY MOUTH IN THE MORNING AND 1 IN THE EVENING DAILY  - potassium chloride ER (KLOR-CON M) 10 MEQ CR tablet; TAKE 1  BY MOUTH ONCE DAILY . APPOINTMENT REQUIRED FOR FUTURE REFILLS  - hydrochlorothiazide (HYDRODIURIL) 25 MG tablet; TAKE 1 TABLET BY MOUTH ONCE DAILY . APPOINTMENT REQUIRED FOR FUTURE REFILLS  - BASIC METABOLIC PANEL; Future  - BASIC METABOLIC PANEL    Hyperlipidemia LDL goal <130  Labs pending   - simvastatin (ZOCOR) 10 MG tablet; TAKE 1 TABLET BY MOUTH AT BEDTIME . APPOINTMENT REQUIRED FOR FUTURE REFILLS    Hypertrophy of prostate with urinary obstruction    - finasteride (PROSCAR) 5 MG tablet; Take 1 tablet (5 mg) by mouth daily         Risks and Recommendations:  The patient has the following additional risks and recommendations for perioperative complications:   - No identified additional risk factors other than previously  addressed    Medication Instructions:  Patient is to take all scheduled medications on the day of surgery EXCEPT for modifications listed below:   - Diuretics: HOLD on the day of surgery.    RECOMMENDATION:  APPROVAL GIVEN to proceed with proposed procedure, without further diagnostic evaluation.    Review of external notes as documented above             Subjective     HPI related to upcoming procedure: pt scheduled for above surgery  He is doing well    Preop Questions 1/20/2022   1. Have you ever had a heart attack or stroke? No   2. Have you ever had surgery on your heart or blood vessels, such as a stent placement, a coronary artery bypass, or surgery on an artery in your head, neck, heart, or legs? No   3. Do you have chest pain with activity? No   4. Do you have a history of  heart failure? No   5. Do you currently have a cold, bronchitis or symptoms of other infection? No   6. Do you have a cough, shortness of breath, or wheezing? No   7. Do you or anyone in your family have previous history of blood clots? No   8. Do you or does anyone in your family have a serious bleeding problem such as prolonged bleeding following surgeries or cuts? No   9. Have you ever had problems with anemia or been told to take iron pills? No   10. Have you had any abnormal blood loss such as black, tarry or bloody stools? No   11. Have you ever had a blood transfusion? No   12. Are you willing to have a blood transfusion if it is medically needed before, during, or after your surgery? Yes   13. Have you or any of your relatives ever had problems with anesthesia? No   14. Do you have sleep apnea, excessive snoring or daytime drowsiness? No   15. Do you have any artifical heart valves or other implanted medical devices like a pacemaker, defibrillator, or continuous glucose monitor? No   16. Do you have artificial joints? No   17. Are you allergic to latex? No       Health Care Directive:  Patient does not have a Health Care  Directive or Living Will: Discussed advance care planning with patient; information given to patient to review.    Preoperative Review of :   reviewed - controlled substances reflected in medication list.      Status of Chronic Conditions:  HYPERLIPIDEMIA - Patient has a long history of significant Hyperlipidemia requiring medication for treatment with recent good control. Patient reports no problems or side effects with the medication.     HYPERTENSION - Patient has longstanding history of HTN , currently denies any symptoms referable to elevated blood pressure. Specifically denies chest pain, palpitations, dyspnea, orthopnea, PND or peripheral edema. Blood pressure readings have been in normal range. Current medication regimen is as listed below. Patient denies any side effects of medication.       Review of Systems  CONSTITUTIONAL: NEGATIVE for fever, chills, change in weight  INTEGUMENTARY/SKIN: NEGATIVE for worrisome rashes, moles or lesions  EYES: NEGATIVE for vision changes or irritation  ENT/MOUTH: NEGATIVE for ear, mouth and throat problems  RESP: NEGATIVE for significant cough or SOB  CV: NEGATIVE for chest pain, palpitations or peripheral edema  GI: NEGATIVE for nausea, abdominal pain, heartburn, or change in bowel habits  : NEGATIVE for frequency, dysuria, or hematuria  MUSCULOSKELETAL: NEGATIVE for significant arthralgias or myalgia  NEURO: NEGATIVE for weakness, dizziness or paresthesias  ENDOCRINE: NEGATIVE for temperature intolerance, skin/hair changes  HEME: NEGATIVE for bleeding problems  PSYCHIATRIC: NEGATIVE for changes in mood or affect    Patient Active Problem List    Diagnosis Date Noted     Hypertension goal BP (blood pressure) < 140/90 11/03/2010     Priority: High     Hyperlipidemia LDL goal <130 10/31/2010     Priority: High     Right inguinal hernia 01/11/2022     Priority: Medium     Added automatically from request for surgery [id number]       Hypertrophy of prostate with urinary  obstruction 05/15/2013     Priority: Medium     Problem list name updated by automated process. Provider to review       Advanced directives, counseling/discussion 08/04/2011     Priority: Medium     GERD (gastroesophageal reflux disease) 05/17/2010     Priority: Medium      Past Medical History:   Diagnosis Date     BPH (benign prostatic hypertrophy)      Chronic low back pain 2001     Colon polyp 2000     Hyperlipidemia      Hypertension      Prostatitis     recurrent     Past Surgical History:   Procedure Laterality Date     ABDOMEN SURGERY  1978    Right ureter reimplantation     APPENDECTOMY       BIOPSY  five times    Prostate     COLONOSCOPY  1/6/2018     HRW CORNEAL TRANSPLANT, CRNA       LASIK  2005     TURP  1998    x 2     [facility] GENITAL SURG PROC,MALE UNLISTED  2001    left hydrocele     [facility] GENITAL SURG PROC,MALE UNLISTED  1977    right ureteral reimplantation     Current Outpatient Medications   Medication Sig Dispense Refill     amLODIPine (NORVASC) 2.5 MG tablet Take 1 tablet (2.5 mg) by mouth daily 90 tablet 3     atenolol (TENORMIN) 25 MG tablet TAKE 2 TABLETS BY MOUTH IN THE MORNING AND 1 IN THE EVENING DAILY 270 tablet 2     famotidine (PEPCID) 10 MG tablet Take 10 mg by mouth 2 times daily       finasteride (PROSCAR) 5 MG tablet Take 1 tablet (5 mg) by mouth daily 90 tablet 3     hydrochlorothiazide (HYDRODIURIL) 25 MG tablet TAKE 1 TABLET BY MOUTH ONCE DAILY . APPOINTMENT REQUIRED FOR FUTURE REFILLS 90 tablet 3     Multiple Vitamins-Minerals (MULTI FOR HIM PO) Take  by mouth daily.       potassium chloride ER (KLOR-CON M) 10 MEQ CR tablet TAKE 1  BY MOUTH ONCE DAILY . APPOINTMENT REQUIRED FOR FUTURE REFILLS 90 tablet 3     simvastatin (ZOCOR) 10 MG tablet TAKE 1 TABLET BY MOUTH AT BEDTIME . APPOINTMENT REQUIRED FOR FUTURE REFILLS 90 tablet 3     tamsulosin (FLOMAX) 0.4 MG capsule Take 1 capsule (0.4 mg) by mouth At Bedtime 90 capsule 3     tiZANidine (ZANAFLEX) 4 MG tablet Take 1 tablet by mouth  three times daily as needed for muscle spasm 30 tablet 3       No Known Allergies     Social History     Tobacco Use     Smoking status: Never Smoker     Smokeless tobacco: Never Used   Substance Use Topics     Alcohol use: Yes     Alcohol/week: 0.0 standard drinks     Comment: rare     Family History   Problem Relation Age of Onset     C.A.D. Father 64        d. MI     Hypertension Father      Hypertension Mother      Cancer Mother         skin     Diabetes No family hx of      Cancer - colorectal No family hx of      History   Drug Use No         Objective     /88   Pulse 78   Temp 97.6  F (36.4  C) (Oral)   Resp 18   Ht 1.829 m (6')   Wt 83.9 kg (185 lb)   SpO2 100%   BMI 25.09 kg/m      Physical Exam    GENERAL APPEARANCE: healthy, alert and no distress     EYES: EOMI,  PERRL     HENT: ear canals and TM's normal and nose and mouth without ulcers or lesions     NECK: no adenopathy, no asymmetry, masses, or scars and thyroid normal to palpation     RESP: lungs clear to auscultation - no rales, rhonchi or wheezes     CV: regular rates and rhythm, normal S1 S2, no S3 or S4 and no murmur, click or rub     ABDOMEN:  soft, nontender, no HSM or masses and bowel sounds normal     MS: extremities normal- no gross deformities noted, no evidence of inflammation in joints, FROM in all extremities.     SKIN: no suspicious lesions or rashes     NEURO: Normal strength and tone, sensory exam grossly normal, mentation intact and speech normal     PSYCH: mentation appears normal. and affect normal/bright     LYMPHATICS: No cervical adenopathy    Recent Labs   Lab Test 02/08/21  0941 04/23/20  1044    141   POTASSIUM 4.1 4.4   CR 0.86 0.86        Diagnostics:  Labs pending at this time.  Results will be reviewed when available.   EKG: appears normal, NSR, normal axis, normal intervals, no acute ST/T changes c/w ischemia, no LVH by voltage criteria, unchanged from previous tracings    Revised Cardiac Risk Index  (RCRI):  The patient has the following serious cardiovascular risks for perioperative complications:   - No serious cardiac risks = 0 points     RCRI Interpretation: 0 points: Class I (very low risk - 0.4% complication rate)           Signed Electronically by: Bita Deluca MD  Copy of this evaluation report is provided to requesting physician.

## 2022-01-24 NOTE — H&P (VIEW-ONLY)
[facility]  [address]  [place] [address]  Phone: [phone]  Primary Provider: Bita Henry  Pre-op Performing Provider: BITA HENRY      PREOPERATIVE EVALUATION:  Today's date: 1/24/2022    Stevemaggie José is a 71 year old male who presents for a preoperative evaluation.    Surgical Information:  Surgery/Procedure: HERNIORRHAPHY, INGUINAL, OPEN  Surgery Location: [facility]   Surgeon: Dr Hahn  Surgery Date: 2/2/22  Time of Surgery: 10:25  Where patient plans to recover: At home with family  Fax number for surgical facility: Note does not need to be faxed, will be available electronically in Epic.    Type of Anesthesia Anticipated: to be determined    Assessment & Plan     The proposed surgical procedure is considered INTERMEDIATE risk.    Pre-op exam    - EKG 12-lead complete w/read - Clinics  - Hemoglobin; Future  - Hemoglobin    Hypertension goal BP (blood pressure) < 140/90  Stable   - amLODIPine (NORVASC) 2.5 MG tablet; Take 1 tablet (2.5 mg) by mouth daily  - atenolol (TENORMIN) 25 MG tablet; TAKE 2 TABLETS BY MOUTH IN THE MORNING AND 1 IN THE EVENING DAILY  - potassium chloride ER (KLOR-CON M) 10 MEQ CR tablet; TAKE 1  BY MOUTH ONCE DAILY . APPOINTMENT REQUIRED FOR FUTURE REFILLS  - hydrochlorothiazide (HYDRODIURIL) 25 MG tablet; TAKE 1 TABLET BY MOUTH ONCE DAILY . APPOINTMENT REQUIRED FOR FUTURE REFILLS  - BASIC METABOLIC PANEL; Future  - BASIC METABOLIC PANEL    Hyperlipidemia LDL goal <130  Labs pending   - simvastatin (ZOCOR) 10 MG tablet; TAKE 1 TABLET BY MOUTH AT BEDTIME . APPOINTMENT REQUIRED FOR FUTURE REFILLS    Hypertrophy of prostate with urinary obstruction    - finasteride (PROSCAR) 5 MG tablet; Take 1 tablet (5 mg) by mouth daily         Risks and Recommendations:  The patient has the following additional risks and recommendations for perioperative complications:   - No identified additional risk factors other than previously  addressed    Medication Instructions:  Patient is to take all scheduled medications on the day of surgery EXCEPT for modifications listed below:   - Diuretics: HOLD on the day of surgery.    RECOMMENDATION:  APPROVAL GIVEN to proceed with proposed procedure, without further diagnostic evaluation.    Review of external notes as documented above             Subjective     HPI related to upcoming procedure: pt scheduled for above surgery  He is doing well    Preop Questions 1/20/2022   1. Have you ever had a heart attack or stroke? No   2. Have you ever had surgery on your heart or blood vessels, such as a stent placement, a coronary artery bypass, or surgery on an artery in your head, neck, heart, or legs? No   3. Do you have chest pain with activity? No   4. Do you have a history of  heart failure? No   5. Do you currently have a cold, bronchitis or symptoms of other infection? No   6. Do you have a cough, shortness of breath, or wheezing? No   7. Do you or anyone in your family have previous history of blood clots? No   8. Do you or does anyone in your family have a serious bleeding problem such as prolonged bleeding following surgeries or cuts? No   9. Have you ever had problems with anemia or been told to take iron pills? No   10. Have you had any abnormal blood loss such as black, tarry or bloody stools? No   11. Have you ever had a blood transfusion? No   12. Are you willing to have a blood transfusion if it is medically needed before, during, or after your surgery? Yes   13. Have you or any of your relatives ever had problems with anesthesia? No   14. Do you have sleep apnea, excessive snoring or daytime drowsiness? No   15. Do you have any artifical heart valves or other implanted medical devices like a pacemaker, defibrillator, or continuous glucose monitor? No   16. Do you have artificial joints? No   17. Are you allergic to latex? No       Health Care Directive:  Patient does not have a Health Care  Directive or Living Will: Discussed advance care planning with patient; information given to patient to review.    Preoperative Review of :   reviewed - controlled substances reflected in medication list.      Status of Chronic Conditions:  HYPERLIPIDEMIA - Patient has a long history of significant Hyperlipidemia requiring medication for treatment with recent good control. Patient reports no problems or side effects with the medication.     HYPERTENSION - Patient has longstanding history of HTN , currently denies any symptoms referable to elevated blood pressure. Specifically denies chest pain, palpitations, dyspnea, orthopnea, PND or peripheral edema. Blood pressure readings have been in normal range. Current medication regimen is as listed below. Patient denies any side effects of medication.       Review of Systems  CONSTITUTIONAL: NEGATIVE for fever, chills, change in weight  INTEGUMENTARY/SKIN: NEGATIVE for worrisome rashes, moles or lesions  EYES: NEGATIVE for vision changes or irritation  ENT/MOUTH: NEGATIVE for ear, mouth and throat problems  RESP: NEGATIVE for significant cough or SOB  CV: NEGATIVE for chest pain, palpitations or peripheral edema  GI: NEGATIVE for nausea, abdominal pain, heartburn, or change in bowel habits  : NEGATIVE for frequency, dysuria, or hematuria  MUSCULOSKELETAL: NEGATIVE for significant arthralgias or myalgia  NEURO: NEGATIVE for weakness, dizziness or paresthesias  ENDOCRINE: NEGATIVE for temperature intolerance, skin/hair changes  HEME: NEGATIVE for bleeding problems  PSYCHIATRIC: NEGATIVE for changes in mood or affect    Patient Active Problem List    Diagnosis Date Noted     Hypertension goal BP (blood pressure) < 140/90 11/03/2010     Priority: High     Hyperlipidemia LDL goal <130 10/31/2010     Priority: High     Right inguinal hernia 01/11/2022     Priority: Medium     Added automatically from request for surgery [id number]       Hypertrophy of prostate with urinary  obstruction 05/15/2013     Priority: Medium     Problem list name updated by automated process. Provider to review       Advanced directives, counseling/discussion 08/04/2011     Priority: Medium     GERD (gastroesophageal reflux disease) 05/17/2010     Priority: Medium      Past Medical History:   Diagnosis Date     BPH (benign prostatic hypertrophy)      Chronic low back pain 2001     Colon polyp 2000     Hyperlipidemia      Hypertension      Prostatitis     recurrent     Past Surgical History:   Procedure Laterality Date     ABDOMEN SURGERY  1978    Right ureter reimplantation     APPENDECTOMY       BIOPSY  five times    Prostate     COLONOSCOPY  1/6/2018     HRW CORNEAL TRANSPLANT, CRNA       LASIK  2005     TURP  1998    x 2     [facility] GENITAL SURG PROC,MALE UNLISTED  2001    left hydrocele     [facility] GENITAL SURG PROC,MALE UNLISTED  1977    right ureteral reimplantation     Current Outpatient Medications   Medication Sig Dispense Refill     amLODIPine (NORVASC) 2.5 MG tablet Take 1 tablet (2.5 mg) by mouth daily 90 tablet 3     atenolol (TENORMIN) 25 MG tablet TAKE 2 TABLETS BY MOUTH IN THE MORNING AND 1 IN THE EVENING DAILY 270 tablet 2     famotidine (PEPCID) 10 MG tablet Take 10 mg by mouth 2 times daily       finasteride (PROSCAR) 5 MG tablet Take 1 tablet (5 mg) by mouth daily 90 tablet 3     hydrochlorothiazide (HYDRODIURIL) 25 MG tablet TAKE 1 TABLET BY MOUTH ONCE DAILY . APPOINTMENT REQUIRED FOR FUTURE REFILLS 90 tablet 3     Multiple Vitamins-Minerals (MULTI FOR HIM PO) Take  by mouth daily.       potassium chloride ER (KLOR-CON M) 10 MEQ CR tablet TAKE 1  BY MOUTH ONCE DAILY . APPOINTMENT REQUIRED FOR FUTURE REFILLS 90 tablet 3     simvastatin (ZOCOR) 10 MG tablet TAKE 1 TABLET BY MOUTH AT BEDTIME . APPOINTMENT REQUIRED FOR FUTURE REFILLS 90 tablet 3     tamsulosin (FLOMAX) 0.4 MG capsule Take 1 capsule (0.4 mg) by mouth At Bedtime 90 capsule 3     tiZANidine (ZANAFLEX) 4 MG tablet Take 1 tablet by mouth  three times daily as needed for muscle spasm 30 tablet 3       No Known Allergies     Social History     Tobacco Use     Smoking status: Never Smoker     Smokeless tobacco: Never Used   Substance Use Topics     Alcohol use: Yes     Alcohol/week: 0.0 standard drinks     Comment: rare     Family History   Problem Relation Age of Onset     C.A.D. Father 64        d. MI     Hypertension Father      Hypertension Mother      Cancer Mother         skin     Diabetes No family hx of      Cancer - colorectal No family hx of      History   Drug Use No         Objective     /88   Pulse 78   Temp 97.6  F (36.4  C) (Oral)   Resp 18   Ht 1.829 m (6')   Wt 83.9 kg (185 lb)   SpO2 100%   BMI 25.09 kg/m      Physical Exam    GENERAL APPEARANCE: healthy, alert and no distress     EYES: EOMI,  PERRL     HENT: ear canals and TM's normal and nose and mouth without ulcers or lesions     NECK: no adenopathy, no asymmetry, masses, or scars and thyroid normal to palpation     RESP: lungs clear to auscultation - no rales, rhonchi or wheezes     CV: regular rates and rhythm, normal S1 S2, no S3 or S4 and no murmur, click or rub     ABDOMEN:  soft, nontender, no HSM or masses and bowel sounds normal     MS: extremities normal- no gross deformities noted, no evidence of inflammation in joints, FROM in all extremities.     SKIN: no suspicious lesions or rashes     NEURO: Normal strength and tone, sensory exam grossly normal, mentation intact and speech normal     PSYCH: mentation appears normal. and affect normal/bright     LYMPHATICS: No cervical adenopathy    Recent Labs   Lab Test 02/08/21  0941 04/23/20  1044    141   POTASSIUM 4.1 4.4   CR 0.86 0.86        Diagnostics:  Labs pending at this time.  Results will be reviewed when available.   EKG: appears normal, NSR, normal axis, normal intervals, no acute ST/T changes c/w ischemia, no LVH by voltage criteria, unchanged from previous tracings    Revised Cardiac Risk Index  (RCRI):  The patient has the following serious cardiovascular risks for perioperative complications:   - No serious cardiac risks = 0 points     RCRI Interpretation: 0 points: Class I (very low risk - 0.4% complication rate)           Signed Electronically by: Bita Deluca MD  Copy of this evaluation report is provided to requesting physician.

## 2022-01-29 ENCOUNTER — LAB (OUTPATIENT)
Dept: URGENT CARE | Facility: URGENT CARE | Age: 72
End: 2022-01-29
Payer: COMMERCIAL

## 2022-01-29 DIAGNOSIS — Z11.59 ENCOUNTER FOR SCREENING FOR OTHER VIRAL DISEASES: ICD-10-CM

## 2022-01-29 PROCEDURE — U0003 INFECTIOUS AGENT DETECTION BY NUCLEIC ACID (DNA OR RNA); SEVERE ACUTE RESPIRATORY SYNDROME CORONAVIRUS 2 (SARS-COV-2) (CORONAVIRUS DISEASE [COVID-19]), AMPLIFIED PROBE TECHNIQUE, MAKING USE OF HIGH THROUGHPUT TECHNOLOGIES AS DESCRIBED BY CMS-2020-01-R: HCPCS

## 2022-01-29 PROCEDURE — U0005 INFEC AGEN DETEC AMPLI PROBE: HCPCS

## 2022-01-31 LAB — SARS-COV-2 RNA RESP QL NAA+PROBE: NEGATIVE

## 2022-02-01 ENCOUNTER — ANESTHESIA EVENT (OUTPATIENT)
Dept: SURGERY | Facility: AMBULATORY SURGERY CENTER | Age: 72
End: 2022-02-01
Payer: COMMERCIAL

## 2022-02-02 ENCOUNTER — HOSPITAL ENCOUNTER (OUTPATIENT)
Facility: AMBULATORY SURGERY CENTER | Age: 72
End: 2022-02-02
Attending: SURGERY | Admitting: SURGERY
Payer: COMMERCIAL

## 2022-02-02 ENCOUNTER — ANESTHESIA (OUTPATIENT)
Dept: SURGERY | Facility: AMBULATORY SURGERY CENTER | Age: 72
End: 2022-02-02
Payer: COMMERCIAL

## 2022-02-02 VITALS
OXYGEN SATURATION: 100 % | SYSTOLIC BLOOD PRESSURE: 158 MMHG | TEMPERATURE: 98 F | RESPIRATION RATE: 21 BRPM | DIASTOLIC BLOOD PRESSURE: 82 MMHG | WEIGHT: 185 LBS | BODY MASS INDEX: 25.09 KG/M2 | HEART RATE: 58 BPM

## 2022-02-02 DIAGNOSIS — K40.90 RIGHT INGUINAL HERNIA: ICD-10-CM

## 2022-02-02 PROCEDURE — G8916 PT W IV AB GIVEN ON TIME: HCPCS

## 2022-02-02 PROCEDURE — 49505 PRP I/HERN INIT REDUC >5 YR: CPT | Mod: RT | Performed by: SURGERY

## 2022-02-02 PROCEDURE — G8907 PT DOC NO EVENTS ON DISCHARG: HCPCS

## 2022-02-02 PROCEDURE — 49505 PRP I/HERN INIT REDUC >5 YR: CPT | Mod: RT

## 2022-02-02 RX ORDER — CEFAZOLIN SODIUM 2 G/100ML
2 INJECTION, SOLUTION INTRAVENOUS SEE ADMIN INSTRUCTIONS
Status: DISCONTINUED | OUTPATIENT
Start: 2022-02-02 | End: 2022-02-03 | Stop reason: HOSPADM

## 2022-02-02 RX ORDER — CEFAZOLIN SODIUM 2 G/100ML
2 INJECTION, SOLUTION INTRAVENOUS
Status: COMPLETED | OUTPATIENT
Start: 2022-02-02 | End: 2022-02-02

## 2022-02-02 RX ORDER — HYDROCODONE BITARTRATE AND ACETAMINOPHEN 5; 325 MG/1; MG/1
1 TABLET ORAL EVERY 4 HOURS PRN
Qty: 18 TABLET | Refills: 0 | Status: SHIPPED | OUTPATIENT
Start: 2022-02-02 | End: 2022-02-05

## 2022-02-02 RX ORDER — ONDANSETRON 2 MG/ML
INJECTION INTRAMUSCULAR; INTRAVENOUS PRN
Status: DISCONTINUED | OUTPATIENT
Start: 2022-02-02 | End: 2022-02-02

## 2022-02-02 RX ORDER — PROPOFOL 10 MG/ML
INJECTION, EMULSION INTRAVENOUS PRN
Status: DISCONTINUED | OUTPATIENT
Start: 2022-02-02 | End: 2022-02-02

## 2022-02-02 RX ORDER — EPHEDRINE SULFATE 50 MG/ML
INJECTION, SOLUTION INTRAMUSCULAR; INTRAVENOUS; SUBCUTANEOUS PRN
Status: DISCONTINUED | OUTPATIENT
Start: 2022-02-02 | End: 2022-02-02

## 2022-02-02 RX ORDER — FENTANYL CITRATE 50 UG/ML
INJECTION, SOLUTION INTRAMUSCULAR; INTRAVENOUS PRN
Status: DISCONTINUED | OUTPATIENT
Start: 2022-02-02 | End: 2022-02-02

## 2022-02-02 RX ORDER — GLYCOPYRROLATE 0.2 MG/ML
INJECTION, SOLUTION INTRAMUSCULAR; INTRAVENOUS PRN
Status: DISCONTINUED | OUTPATIENT
Start: 2022-02-02 | End: 2022-02-02

## 2022-02-02 RX ORDER — FENTANYL CITRATE 50 UG/ML
25 INJECTION, SOLUTION INTRAMUSCULAR; INTRAVENOUS
Status: DISCONTINUED | OUTPATIENT
Start: 2022-02-02 | End: 2022-02-03 | Stop reason: HOSPADM

## 2022-02-02 RX ORDER — PROPOFOL 10 MG/ML
INJECTION, EMULSION INTRAVENOUS CONTINUOUS PRN
Status: DISCONTINUED | OUTPATIENT
Start: 2022-02-02 | End: 2022-02-02

## 2022-02-02 RX ORDER — ACETAMINOPHEN 325 MG/1
975 TABLET ORAL ONCE
Status: COMPLETED | OUTPATIENT
Start: 2022-02-02 | End: 2022-02-02

## 2022-02-02 RX ORDER — ONDANSETRON 2 MG/ML
4 INJECTION INTRAMUSCULAR; INTRAVENOUS EVERY 30 MIN PRN
Status: DISCONTINUED | OUTPATIENT
Start: 2022-02-02 | End: 2022-02-03 | Stop reason: HOSPADM

## 2022-02-02 RX ORDER — LIDOCAINE HYDROCHLORIDE 20 MG/ML
INJECTION, SOLUTION INFILTRATION; PERINEURAL PRN
Status: DISCONTINUED | OUTPATIENT
Start: 2022-02-02 | End: 2022-02-02

## 2022-02-02 RX ORDER — ONDANSETRON 4 MG/1
4 TABLET, ORALLY DISINTEGRATING ORAL EVERY 30 MIN PRN
Status: DISCONTINUED | OUTPATIENT
Start: 2022-02-02 | End: 2022-02-03 | Stop reason: HOSPADM

## 2022-02-02 RX ORDER — BUPIVACAINE HYDROCHLORIDE AND EPINEPHRINE 2.5; 5 MG/ML; UG/ML
INJECTION, SOLUTION INFILTRATION; PERINEURAL PRN
Status: DISCONTINUED | OUTPATIENT
Start: 2022-02-02 | End: 2022-02-02 | Stop reason: HOSPADM

## 2022-02-02 RX ORDER — MEPERIDINE HYDROCHLORIDE 25 MG/ML
12.5 INJECTION INTRAMUSCULAR; INTRAVENOUS; SUBCUTANEOUS
Status: DISCONTINUED | OUTPATIENT
Start: 2022-02-02 | End: 2022-02-03 | Stop reason: HOSPADM

## 2022-02-02 RX ORDER — SODIUM CHLORIDE, SODIUM LACTATE, POTASSIUM CHLORIDE, CALCIUM CHLORIDE 600; 310; 30; 20 MG/100ML; MG/100ML; MG/100ML; MG/100ML
INJECTION, SOLUTION INTRAVENOUS CONTINUOUS
Status: DISCONTINUED | OUTPATIENT
Start: 2022-02-02 | End: 2022-02-03 | Stop reason: HOSPADM

## 2022-02-02 RX ORDER — FENTANYL CITRATE 50 UG/ML
25 INJECTION, SOLUTION INTRAMUSCULAR; INTRAVENOUS EVERY 5 MIN PRN
Status: DISCONTINUED | OUTPATIENT
Start: 2022-02-02 | End: 2022-02-03 | Stop reason: HOSPADM

## 2022-02-02 RX ORDER — OXYCODONE HYDROCHLORIDE 5 MG/1
5 TABLET ORAL EVERY 4 HOURS PRN
Status: DISCONTINUED | OUTPATIENT
Start: 2022-02-02 | End: 2022-02-03 | Stop reason: HOSPADM

## 2022-02-02 RX ADMIN — PROPOFOL 200 MG: 10 INJECTION, EMULSION INTRAVENOUS at 10:14

## 2022-02-02 RX ADMIN — SODIUM CHLORIDE, SODIUM LACTATE, POTASSIUM CHLORIDE, CALCIUM CHLORIDE: 600; 310; 30; 20 INJECTION, SOLUTION INTRAVENOUS at 12:00

## 2022-02-02 RX ADMIN — Medication 10 MG: at 10:35

## 2022-02-02 RX ADMIN — ACETAMINOPHEN 975 MG: 325 TABLET ORAL at 10:05

## 2022-02-02 RX ADMIN — FENTANYL CITRATE 25 MCG: 50 INJECTION, SOLUTION INTRAMUSCULAR; INTRAVENOUS at 12:50

## 2022-02-02 RX ADMIN — OXYCODONE HYDROCHLORIDE 5 MG: 5 TABLET ORAL at 12:56

## 2022-02-02 RX ADMIN — Medication 10 MG: at 11:07

## 2022-02-02 RX ADMIN — CEFAZOLIN SODIUM 2 G: 2 INJECTION, SOLUTION INTRAVENOUS at 10:11

## 2022-02-02 RX ADMIN — SODIUM CHLORIDE, SODIUM LACTATE, POTASSIUM CHLORIDE, CALCIUM CHLORIDE: 600; 310; 30; 20 INJECTION, SOLUTION INTRAVENOUS at 09:48

## 2022-02-02 RX ADMIN — EPHEDRINE SULFATE 10 MG: 50 INJECTION, SOLUTION INTRAMUSCULAR; INTRAVENOUS; SUBCUTANEOUS at 10:57

## 2022-02-02 RX ADMIN — PROPOFOL 50 MCG/KG/MIN: 10 INJECTION, EMULSION INTRAVENOUS at 10:22

## 2022-02-02 RX ADMIN — GLYCOPYRROLATE 0.2 MG: 0.2 INJECTION, SOLUTION INTRAMUSCULAR; INTRAVENOUS at 10:53

## 2022-02-02 RX ADMIN — Medication 10 MG: at 11:31

## 2022-02-02 RX ADMIN — LIDOCAINE HYDROCHLORIDE 80 MG: 20 INJECTION, SOLUTION INFILTRATION; PERINEURAL at 10:14

## 2022-02-02 RX ADMIN — FENTANYL CITRATE 100 MCG: 50 INJECTION, SOLUTION INTRAMUSCULAR; INTRAVENOUS at 10:14

## 2022-02-02 RX ADMIN — FENTANYL CITRATE 25 MCG: 50 INJECTION, SOLUTION INTRAMUSCULAR; INTRAVENOUS at 12:55

## 2022-02-02 RX ADMIN — ONDANSETRON 4 MG: 2 INJECTION INTRAMUSCULAR; INTRAVENOUS at 10:23

## 2022-02-02 RX ADMIN — Medication 40 MG: at 10:17

## 2022-02-02 NOTE — ANESTHESIA PREPROCEDURE EVALUATION
Anesthesia Pre-Procedure Evaluation    Patient: Steve José   MRN: [id number] : 1950        Preoperative Diagnosis: Right inguinal hernia [K40.90]    Procedure : Procedure(s):  HERNIORRHAPHY, INGUINAL, OPEN          Past Medical History:   Diagnosis Date     BPH (benign prostatic hypertrophy)      Chronic low back pain      Colon polyp      Hyperlipidemia      Hypertension      Prostatitis     recurrent      Past Surgical History:   Procedure Laterality Date     ABDOMEN SURGERY      Right ureter reimplantation     APPENDECTOMY       BIOPSY  five times    Prostate     COLONOSCOPY  2018     [employer], CRNA       LASIK       TURP  1998    x 2     Z GENITAL SURG PROC,MALE UNLISTED      left hydrocele     [facility] GENITAL SURG PROC,MALE UNLISTED      right ureteral reimplantation      No Known Allergies   Social History     Tobacco Use     Smoking status: Never Smoker     Smokeless tobacco: Never Used   Substance Use Topics     Alcohol use: Yes     Alcohol/week: 0.0 standard drinks     Comment: rare      Wt Readings from Last 1 Encounters:   22 83.9 kg (185 lb)        Anesthesia Evaluation            ROS/MED HX  ENT/Pulmonary:       Neurologic:       Cardiovascular:     (+) Dyslipidemia hypertension-----    METS/Exercise Tolerance:     Hematologic:       Musculoskeletal:       GI/Hepatic:     (+) GERD,     Renal/Genitourinary:       Endo:       Psychiatric/Substance Use:       Infectious Disease:       Malignancy:       Other:            Physical Exam    Airway  airway exam normal      Mallampati: II   TM distance: > 3 FB   Neck ROM: full   Mouth opening: > 3 cm    Respiratory Devices and Support         Dental  no notable dental history         Cardiovascular          Rhythm and rate: regular and normal     Pulmonary   pulmonary exam normal        breath sounds clear to auscultation           OUTSIDE LABS:  CBC:   Lab Results   Component Value Date    WBC 12.9 (H)  07/21/2014    WBC 6.0 05/01/2009    HGB 13.3 01/24/2022    HGB 12.5 (L) 07/21/2014    HCT 36.3 (L) 07/21/2014    HCT 40.5 05/01/2009     07/21/2014     05/01/2009     BMP:   Lab Results   Component Value Date     01/24/2022     02/08/2021    POTASSIUM 4.2 01/24/2022    POTASSIUM 4.1 02/08/2021    CHLORIDE 103 01/24/2022    CHLORIDE 102 02/08/2021    CO2 34 (H) 01/24/2022    CO2 34 (H) 02/08/2021    BUN 24 01/24/2022    BUN 22 02/08/2021    CR 0.78 01/24/2022    CR 0.86 02/08/2021     (H) 01/24/2022     (H) 02/08/2021     COAGS: No results found for: PTT, INR, FIBR  POC: No results found for: BGM, HCG, HCGS  HEPATIC:   Lab Results   Component Value Date    ALBUMIN 3.8 04/23/2020    PROTTOTAL 6.7 (L) 04/23/2020    ALT 28 04/23/2020    AST 20 04/23/2020    ALKPHOS 56 04/23/2020    BILITOTAL 0.6 04/23/2020     OTHER:   Lab Results   Component Value Date    A1C 5.8 04/17/2015    NATALI 9.2 01/24/2022    TSH 1.15 11/02/2010       Anesthesia Plan    ASA Status:  2   NPO Status:  NPO Appropriate    Anesthesia Type: General.     - Airway: ETT   Induction: Intravenous.   Maintenance: Balanced.        Consents    Anesthesia Plan(s) and associated risks, benefits, and realistic alternatives discussed. Questions answered and patient/representative(s) expressed understanding.    - Discussed:     - Discussed with:  Patient      - Extended Intubation/Ventilatory Support Discussed: No.      - Patient is DNR/DNI Status: No    Use of blood products discussed: No .     Postoperative Care    Pain management: IV analgesics, Oral pain medications, Multi-modal analgesia.   PONV prophylaxis: Ondansetron (or other 5HT-3), Dexamethasone or Solumedrol, Background Propofol Infusion     Comments:                Tawanda Zepeda MD

## 2022-02-02 NOTE — DISCHARGE INSTRUCTIONS
HERNIORRHAPHY DISCHARGE INSTRUCTIONS  DR. ART MAC    1. You may resume your regular diet when you feel you are ready to. DO NOT drink alcoholic beverages for 24 hours or while you are taking prescription medication.    2. Limit your activities for the first 48 hours. Gradually, increase them as tolerated. You may use stairs. I encourage you to walk as tolerated. No lifting greater that 20 pounds for 6 weeks.    3. You will have some discomfort at the incision sites. This is expected. This should improve over the next 2-3 days. Ice and pain medication will help with this pain. Use prescribed pain medication as instructed.    4. Bruising and mild swelling is normal after surgery. For males it is common to have bruising going into the penis and scrotum. The area below and around the incision(s) will be hard and elevated. This is normal. I call it the healing ridge. This will resolve slowly over the next several months. If you feel the pain is increasing and cannot explain it by increasing activity please call us at [phone].    5. The dressing will often have some blood on it. You may shower 24 hours after surgery. No baths for 2 weeks after surgery. Clean gently over incision site. If clear plastic covering or steri-strip comes off and there is still some bleeding or drainage then cover with gauze or band-aid. If no bleeding, there is no reason to cover site. The abdominal binder may be removed after 24 hours after surgery. You may continue to wear it however for comfort. I suggest  you wear an old t-shirt under the abdominal binder for a more comfortable wear.    6. Avoid Aspirin for the first 72 hours after the procedure. This medication may increase the tendency to bleed.    7. Use the following medications (in addition to your normal meds) as shown:  a. Percocet 5 mg 1-2 every 6 hours as needed for severe pain. This contains 325 mg of Tylenol (acetaminophen) per tablet.  Please do not take more than  4 grams of Tylenol (acetaminophen) per day. For example, you may take 1 Percocet and 1 Tylenol, or 2 Percocet and no Tylenol, or 2 Tylenol and no Percocet every 6 hours.  b. Tylenol (acetaminophen) 500 mg every 6 hours as needed for mild pain. Do not take more than 1000 mg every 6 hours. (see above).  c. Motrin (ibuprofen) 200-800 mg every 6 hours as needed for mild to moderate pain. Take with food.     8. Notify Dr. Melo's clinic at [phone] if:    Your discomfort is not relieved by your pain medication.    You have signs of infection such as temperature above 100.5 degrees orally, chills, or increasing daily discomfort.    Incision site is becoming more red and/or there is purulent drainage.    You have questions or concerns.    9. Please call [phone] to schedule a follow up appointment in about 2 weeks.    10. When taking narcotics (pain medication more than Tylenol [acetaminophen] and Motrin [ibuprofen]) it is important to keep your stools soft to avoid constipation and pain with straining. This is best done by drinking fluids (non-alcoholic and non-caffeinated) and taking a stool softener (i.e. Metamucil or milk of magnesia). You may be able to use non-narcotics for pain relief especially by the 3rd post- operative day. Tylenol (acetaminophen) 500 mg every 6 hours and/or Motrin (ibuprofen) 200-800 mg every 6 hours. Please do not take more than 4 grams of Tylenol (acetaminophen) per day. Remember your Percocet does have Tylenol (acetaminophen) already in it. Please take Motrin (ibuprofen) with food to help protect the stomach. If you have a history of stomach ulcers or stomach problems, do not take Motrin (ibuprofen).     11. Do not drive or operate heavy machinery for 24 hours after surgery or when taking narcotics. You may resume driving when feel that you can safely avoid an accident and are not taking narcotics. This is usually 5 to 7 days after surgery. You should not be alone for 24 hours  after surgery.    12. Have milk of magnesia available at home so that when you take the pain medications you take 1-2 tablepoons a day, to help reduce problems with constipation.      13. If you have questions after your procedure/surgery please contact Dr Melo's primary clinic in [place]. You can call [phone], your other option is to send us a [employer] message through your [employer] portal to Dr Melo's team. For urgent and non urgent matters these options are best. If your symptoms are emergent or cannot wait, please proceed to [facility] and let them know who you had surgery with.    [facility]   Adult Discharge Orders & Instructions     For 24 hours after surgery    1. Get plenty of rest.  A responsible adult must stay with you for at least 24 hours after you leave the hospital.   2. Do not drive or use heavy equipment.  If you have weakness or tingling, don't drive or use heavy equipment until this feeling goes away.  3. Do not drink alcohol.  4. Avoid strenuous or risky activities.  Ask for help when climbing stairs.   5. You may feel lightheaded.  IF so, sit for a few minutes before standing.  Have someone help you get up.   6. If you have nausea (feel sick to your stomach): Drink only clear liquids such as apple juice, ginger ale, broth or 7-Up.  Rest may also help.  Be sure to drink enough fluids.  Move to a regular diet as you feel able.  7. You may have a slight fever. Call the doctor if your fever is over 100 F (37.7 C) (taken under the tongue) or lasts longer than 24 hours.  8. You may have a dry mouth, a sore throat, muscle aches or trouble sleeping.  These should go away after 24 hours.  9. Do not make important or legal decisions.     Call your doctor for any of the followin.  Signs of infection (fever, growing tenderness at the surgery site, a large amount of drainage or bleeding, severe pain, foul-smelling drainage, redness, swelling).    2. It has been  over 8 to 10 hours since surgery and you are still not able to urinate (pass water).    3.  Headache for over 24 hours.                 4. Signs of Covid-19 infection (temperature over 100 degrees, shortness of breath, cough, loss of taste/smell, generalized body aches, persistent headache,                  chills, sore throat, nausea/vomiting/diarrhea).      Call to set up appoinment with Dr. Sotelo with Urology to remove Ruiz Catheter and determine follow-up  To contact Dr Sotelo call:  [phone]

## 2022-02-02 NOTE — OP NOTE
OPERATIVE REPORT    February 2, 2022  Preoperative diagnosis: right  Inguinal hernia.  Postoperative diagnosis:  same with very weak floor and had a previous incision just above this area  Procedure:  Right  Inguinal hernia repair with right  ProGrip 12 by 8 cm  Anesthesia:  General anesthesia with 0.25% marcaine placed thru out the procedure.   Blood loss: 10 cc  Specimen: none sent  Surgeon : Phu Melo M.D.  Indications:  Steve José is a 71 year old year old male with a right  inguinal hernia that is causing  discomfort.  It was felt that it should be repaired.  Risks and complications were explained to the patient including bleeding, risk of anenesthesia, infection, recurrance of hernia, damage to bowel, bladder and  vessels to the testicle.  That mesh is a better long term repair, but the down side is that if it gets infected will need to be removed.  And since there was a previous surgery in th area will be hard to find the ilioinguinal nerve.   Questions were answered and postoperative instructions were given to patient and any one with the patient.      Procedure:  The patient was brought to the OR, placed in supine position  after given  general anesthesia, the right  groin was prepped in sterile manner. After a time out, an Incision was made in the usual fashion with a knife and then blunt and sharp and cautery was used to dissect down to the external oblique.  The external oblique was opened in the direction of its fibers at the external canal with cautery and then the rest was mainly blunt dissection.  The external oblique was pulled back and the internal oblique layer was weak also. The hernia was following the spermatic cord and The spermatic cord was carefully freed of surrounding attachments and placed in a Penrose drain.  All attachments with the cremasteric muscles were taken down with cautery.  There was an obvious hernia seen within the spermatic cord, so it was more of  an  indirect hernia, with  the defect involving the floor, so I felt that once we had that completely freed up and placed back inside the abdomen, I did close the defect, catching the internal oblique muscle and fascia with a continuous running 0 Vicryl suture, and catching as far down on the lateral aspect of the external oblique fascia as possible to leave some room to close over the spermatic cord.   I also used some of the cremaesteic tissue and sutured it to the floor before pulling the internal oblique tissue together.       After that was done, I was able to place a finger between my closure and the spermatic cord without much difficulty.  The  Right  12x8 cm ProGrip mesh was placed.  It was secured at the pubic tubercle with an 0-Vicryl suture at the marking on the mesh and then was laid on the floor, recreating the internal canal by bringing it around the spermatic cord.  It laid there very nicely.  I was able to place a finger between the spermatic cord and the mesh, but it looks like it is going to cover the area well.  After that was done and laid nicely, the area was irrigated with antibiotic irrigation with good clean return.  No evidence of any bleeding.  The spermatic cord was then placed on top of the mesh.  I never saw  the nerve, but made sure that where I sutured did not involve the nerve.     Then, the external oblique fascia  Or what I could pull over the spermatic cord was closed with a continuous running 0 Vicryl suture, taking great care not to involve the nerve and closing over the spermatic cord.  The Hailee's fascia was brought together with several interrupted 3-0 Vicryl sutures, and skin was closed with continuous running 4-0 Monocryl, covered with tincture of Benzoin, Steri-Strips and a Tegaderm, along with an abdominal binder.  The patient did receive antibiotics preoperatively.       Plan is to discharge him home today.  Lift no more than 20 pounds for 6  weeks.  I will see him back  in approximately 2 weeks.           ART MAC MD

## 2022-02-02 NOTE — ANESTHESIA CARE TRANSFER NOTE
Patient: Steve José    Procedure: Procedure(s):  OPEN RIGHT INGUINAL HERNIORRHAPHY WITH MESH       Diagnosis: Right inguinal hernia [K40.90]  Diagnosis Additional Information: No value filed.    Anesthesia Type:   General     Note:    Oropharynx: spontaneously breathing  Level of Consciousness: drowsy  Oxygen Supplementation: face mask  Level of Supplemental Oxygen (L/min / FiO2): 10  Independent Airway: airway patency satisfactory and stable  Dentition: dentition unchanged  Vital Signs Stable: post-procedure vital signs reviewed and stable  Report to RN Given: handoff report given  Patient transferred to: PACU    Handoff Report: Identifed the Patient, Identified the Reponsible Provider, Reviewed the pertinent medical history, Discussed the surgical course, Reviewed Intra-OP anesthesia mangement and issues during anesthesia, Set expectations for post-procedure period and Allowed opportunity for questions and acknowledgement of understanding      Vitals:  Vitals Value Taken Time   BP     Temp     Pulse     Resp     SpO2         Electronically Signed By: PAUL Flores CRNA  February 2, 2022  12:14 PM

## 2022-02-02 NOTE — ANESTHESIA POSTPROCEDURE EVALUATION
Patient: Steve José    Procedure: Procedure(s):  OPEN RIGHT INGUINAL HERNIORRHAPHY WITH MESH       Diagnosis:Right inguinal hernia [K40.90]  Diagnosis Additional Information: No value filed.    Anesthesia Type:  General    Note:  Disposition: Outpatient   Postop Pain Control: Uneventful            Sign Out: Well controlled pain   PONV: No   Neuro/Psych: Uneventful            Sign Out: Acceptable/Baseline neuro status   Airway/Respiratory: Uneventful            Sign Out: Acceptable/Baseline resp. status   CV/Hemodynamics: Uneventful            Sign Out: Acceptable CV status   Other NRE: NONE   DID A NON-ROUTINE EVENT OCCUR? No           Last vitals:  Vitals Value Taken Time   /82 02/02/22 1300   Temp 36.4  C (97.6  F) 02/02/22 1210   Pulse 61 02/02/22 1300   Resp 14 02/02/22 1300   SpO2 100 % 02/02/22 1300       Electronically Signed By: Tawanda Zepeda MD  February 2, 2022  3:28 PM

## 2022-02-02 NOTE — ANESTHESIA PROCEDURE NOTES
Airway         Procedure Start/Stop Times: 2/2/2022 10:19 AM and 2/2/2022 10:42 AM  Staff -        CRNA: Renny Mills APRN CRNA       Performed By: anesthesiologistIndications and Patient Condition       Indications for airway management: airway protection       Induction type:intravenous       Mask difficulty assessment: 1 - vent by mask    Final Airway Details       Final airway type: endotracheal airway       Successful airway: ETT - single and Oral  Endotracheal Airway Details        ETT size (mm): 8.0       Cuffed: yes       Successful intubation technique: direct laryngoscopy       DL Blade Type: Block 2       Grade View of Cords: 1       Adjucts: stylet       Position: Right       Measured from: gums/teeth       Bite block used: None    Post intubation assessment        Placement verified by: capnometry, equal breath sounds and chest rise        Number of attempts at approach: 1       Number of other approaches attempted: 0       Secured with: plastic tape       Ease of procedure: easy       Dentition: Intact

## 2022-02-03 ENCOUNTER — TELEPHONE (OUTPATIENT)
Dept: UROLOGY | Facility: CLINIC | Age: 72
End: 2022-02-03
Payer: COMMERCIAL

## 2022-02-03 NOTE — TELEPHONE ENCOUNTER
Patient states he had hernia surgery yesterday and had a cath put in and was told to have it removed next week.  Please call and let him know if he can be fit in.    Thank you.

## 2022-02-03 NOTE — TELEPHONE ENCOUNTER
Dr Melo- can you place orders for a trial of void. We do these frequently in urology on the nurse schedule so if he does not pass you will be notified. We do make them wait the 7-10 as per our urology protocol.     Tessie HANDLEY RN Specialty Triage 2/3/2022 9:59 AM

## 2022-02-04 ENCOUNTER — TELEPHONE (OUTPATIENT)
Dept: UROLOGY | Facility: CLINIC | Age: 72
End: 2022-02-04

## 2022-02-04 NOTE — TELEPHONE ENCOUNTER
Reason for Call:  Same Day Appointment, Requested Provider:  Dr Sotelo    PCP: Bita Deluca    Reason for visit: cath removal    Duration of symptoms:     Have you been treated for this in the past? No    Additional comments: patient wondering if he could be worked in next week     Can we leave a detailed message on this number? YES    Phone number patient can be reached at: Home number on file [phone] (home)    Best Time: any    Call taken on 2/4/2022 at 9:33 AM by Laura Bartlett

## 2022-02-08 ENCOUNTER — OFFICE VISIT (OUTPATIENT)
Dept: UROLOGY | Facility: CLINIC | Age: 72
End: 2022-02-08
Payer: COMMERCIAL

## 2022-02-08 VITALS — HEART RATE: 73 BPM | DIASTOLIC BLOOD PRESSURE: 79 MMHG | OXYGEN SATURATION: 100 % | SYSTOLIC BLOOD PRESSURE: 138 MMHG

## 2022-02-08 DIAGNOSIS — N45.2 ORCHITIS: ICD-10-CM

## 2022-02-08 DIAGNOSIS — N40.1 BENIGN PROSTATIC HYPERPLASIA WITH URINARY RETENTION: Primary | ICD-10-CM

## 2022-02-08 DIAGNOSIS — R33.8 BENIGN PROSTATIC HYPERPLASIA WITH URINARY RETENTION: Primary | ICD-10-CM

## 2022-02-08 PROCEDURE — 99214 OFFICE O/P EST MOD 30 MIN: CPT | Mod: 25 | Performed by: UROLOGY

## 2022-02-08 PROCEDURE — 51700 IRRIGATION OF BLADDER: CPT | Performed by: UROLOGY

## 2022-02-08 RX ORDER — CIPROFLOXACIN 500 MG/1
500 TABLET, FILM COATED ORAL 2 TIMES DAILY
Qty: 20 TABLET | Refills: 0 | Status: SHIPPED | OUTPATIENT
Start: 2022-02-08 | End: 2022-04-27

## 2022-02-08 NOTE — PROGRESS NOTES
Patient arrives for trial of void.  150ml sterile water instilled into bladder through existing batres catheter spontaneous urination around the catheter. Catheter removed without problem.  Mireille Mackey, CMA

## 2022-02-08 NOTE — PROGRESS NOTES
Pt came back into clinic for PVR. Pt states that he has voided 3-4 times since his catheter came out.  His PVR today showed 22 ml. MD aware.    Jeanie ZACARIAS RN Urology 2/8/2022 2:06 PM

## 2022-02-08 NOTE — PROGRESS NOTES
No chief complaint on file.      Steve José is a 71 year old male who presents today for follow up of No chief complaint on file.     Patient is a pleasant 71-year-old male who underwent right inguinal hernia repair recently and went into urinary retention.  Ruiz catheter was placed.  He has history of BPH and is currently on finasteride and Flomax.  He also complains of right scrotal pain and swelling for the last several days.  He has no fever.  Recent ER visit reviewed.  Current Outpatient Medications   Medication Sig Dispense Refill     amLODIPine (NORVASC) 2.5 MG tablet Take 1 tablet (2.5 mg) by mouth daily 90 tablet 3     atenolol (TENORMIN) 25 MG tablet TAKE 2 TABLETS BY MOUTH IN THE MORNING AND 1 IN THE EVENING DAILY 270 tablet 2     ciprofloxacin (CIPRO) 500 MG tablet Take 1 tablet (500 mg) by mouth 2 times daily 20 tablet 0     famotidine (PEPCID) 10 MG tablet Take 10 mg by mouth 2 times daily       finasteride (PROSCAR) 5 MG tablet Take 1 tablet (5 mg) by mouth daily 90 tablet 3     hydrochlorothiazide (HYDRODIURIL) 25 MG tablet TAKE 1 TABLET BY MOUTH ONCE DAILY . APPOINTMENT REQUIRED FOR FUTURE REFILLS 90 tablet 3     Multiple Vitamins-Minerals (MULTI FOR HIM PO) Take  by mouth daily.       potassium chloride ER (KLOR-CON M) 10 MEQ CR tablet TAKE 1  BY MOUTH ONCE DAILY . APPOINTMENT REQUIRED FOR FUTURE REFILLS 90 tablet 3     simvastatin (ZOCOR) 10 MG tablet TAKE 1 TABLET BY MOUTH AT BEDTIME . APPOINTMENT REQUIRED FOR FUTURE REFILLS 90 tablet 3     tamsulosin (FLOMAX) 0.4 MG capsule Take 1 capsule (0.4 mg) by mouth At Bedtime 90 capsule 3     tiZANidine (ZANAFLEX) 4 MG tablet Take 1 tablet by mouth three times daily as needed for muscle spasm 30 tablet 3     No Known Allergies   Past Medical History:   Diagnosis Date     BPH (benign prostatic hypertrophy)      Chronic low back pain 2001     Colon polyp 2000     Hyperlipidemia      Hypertension      Prostatitis     recurrent     Past Surgical  History:   Procedure Laterality Date     ABDOMEN SURGERY  1978    Right ureter reimplantation     APPENDECTOMY       BIOPSY  five times    Prostate     COLONOSCOPY  1/6/2018     HRW CORNEAL TRANSPLANT, CRNA       LASIK  2005     TURP  1998    x 2     ZZC GENITAL SURG PROC,MALE UNLISTED  2001    left hydrocele     ZZC GENITAL SURG PROC,MALE UNLISTED  1977    right ureteral reimplantation     Family History   Problem Relation Age of Onset     C.A.D. Father 64        d. MI     Hypertension Father      Hypertension Mother      Cancer Mother         skin     Diabetes No family hx of      Cancer - colorectal No family hx of      Social History     Socioeconomic History     Marital status:      Spouse name: Amy      Number of children: 0     Years of education: 15     Highest education level: None   Occupational History     Occupation: printing company sales     Employer: RETIRED   Tobacco Use     Smoking status: Never Smoker     Smokeless tobacco: Never Used   Substance and Sexual Activity     Alcohol use: Yes     Alcohol/week: 0.0 standard drinks     Comment: rare     Drug use: No     Sexual activity: Yes     Partners: Female     Birth control/protection: None   Other Topics Concern     Parent/sibling w/ CABG, MI or angioplasty before 65F 55M? No      Service No     Blood Transfusions No     Comment: unsure     Caffeine Concern No     Occupational Exposure No     Hobby Hazards No     Sleep Concern No     Stress Concern No     Weight Concern No     Special Diet No     Comment: watches intake of salt and sugar     Back Care No     Exercise Yes     Comment: everyday goes for mile and a half to two mile walk     Bike Helmet No     Seat Belt Yes     Self-Exams Yes   Social History Narrative     None     Social Determinants of Health     Financial Resource Strain: Not on file   Food Insecurity: Not on file   Transportation Needs: Not on file   Physical Activity: Not on file   Stress: Not on file   Social  Connections: Not on file   Intimate Partner Violence: Not on file   Housing Stability: Not on file       REVIEW OF SYSTEMS  =================  C: NEGATIVE for fever, chills, change in weight  I: NEGATIVE for worrisome rashes, moles or lesions  E/M: NEGATIVE for ear, mouth and throat problems  R: NEGATIVE for significant cough or SHORTNESS OF BREATH  CV:  NEGATIVE for chest pain, palpitations or peripheral edema  GI: NEGATIVE for nausea, abdominal pain, heartburn, or change in bowel habits  NEURO: NEGATIVE numbness/weakness  : see HPI  PSYCH: NEGATIVE depression/anxiety  LYmph: no new enlarged lymph nodes  Ortho: no new trauma/movements    Physical Exam:  /79 (BP Location: Left arm, Patient Position: Chair, Cuff Size: Adult Large)   Pulse 73   SpO2 100%    Patient is pleasant, in no acute distress, good general condition.  Lung: no evidence of respiratory distress    Abdomen: Soft, nondistended, non tender. No masses. No rebound or guarding.   Exam: Penis no discharge.  Right testes tender to touch and swollen.  No cellulitis in the scrotal wall.  Left testes normal.  Skin: Warm and dry.  No redness.  Psych: normal mood and affect  Neuro: alert and oriented  Musculaskeletal: moving all extremities    Assessment/Plan:   (N40.1,  R33.8) Benign prostatic hyperplasia with urinary retention  (primary encounter diagnosis)  Comment: About 200 mm of water was placed into the bladder.  Patient was able to void some.  Plan: Return clinic later on today for bladder scan for confirmation.    (N45.2) Orchitis  Comment:    Plan: ciprofloxacin (CIPRO) 500 MG tablet        P.o. twice daily for 10 days.

## 2022-02-11 ENCOUNTER — MYC MEDICAL ADVICE (OUTPATIENT)
Dept: SURGERY | Facility: CLINIC | Age: 72
End: 2022-02-11
Payer: COMMERCIAL

## 2022-02-11 NOTE — TELEPHONE ENCOUNTER
Patient is feeling some swelling and some discomfort at the incision down to the testicle.    Feels restricted.    Patient with no chills or fever no erythema/  is on cipro.   Still has burning with urination.    The testicle is swollen.    Try to support the testicles.    Call if getting worse.   Phu Melo MD

## 2022-02-15 ENCOUNTER — OFFICE VISIT (OUTPATIENT)
Dept: SURGERY | Facility: CLINIC | Age: 72
End: 2022-02-15
Payer: COMMERCIAL

## 2022-02-15 VITALS — SYSTOLIC BLOOD PRESSURE: 154 MMHG | DIASTOLIC BLOOD PRESSURE: 84 MMHG | OXYGEN SATURATION: 100 % | HEART RATE: 70 BPM

## 2022-02-15 DIAGNOSIS — Z87.19 S/P RIGHT INGUINAL HERNIORRHAPHY: Primary | ICD-10-CM

## 2022-02-15 DIAGNOSIS — Z98.890 S/P RIGHT INGUINAL HERNIORRHAPHY: Primary | ICD-10-CM

## 2022-02-15 PROCEDURE — 99024 POSTOP FOLLOW-UP VISIT: CPT | Performed by: SURGERY

## 2022-02-15 ASSESSMENT — PAIN SCALES - GENERAL: PAINLEVEL: MODERATE PAIN (4)

## 2022-02-15 NOTE — PROGRESS NOTES
Steve is a 71 year old male who is status post right inguinal hernia repair with had chills and fever. Was started on cipro for a testicular infection. No chills or fever recently.       Patient's  Pain is  improving.  Appetite is  improving.    Wound(s)  Is/are   clean dry and intact with no evidence of infection.  His left testicle is swollen and firm and tender. There is a lot of swelling in the canal area also. It was a lot of dissection.    Had a catheter in post op.  Patient did not have much pain for the first 3 days.  Then had more swelling I nthe right testicle but the swelling is better now than 5 days.        Questions were answered and discussion of no lifting more than 20 pounds for  3 weeks after surgery.    If patient has chills or fever let us know and can put back on the cipro.        Plan: follow up in 2 weeks  Will check with Dr. Sotelo if not improving.  Use antibiotic ointment on wound at night.     Time spent with the patient with greater that 50% of the time in discussion was 15 minutes.  Phu Melo MD           OPERATIVE REPORT     February 2, 2022  Preoperative diagnosis: right  Inguinal hernia.  Postoperative diagnosis:  same with very weak floor and had a previous incision just above this area  Procedure:  Right  Inguinal hernia repair with right  ProGrip 12 by 8 cm  Anesthesia:  General anesthesia with 0.25% marcaine placed thru out the procedure.   Blood loss: 10 cc  Specimen: none sent  Surgeon : Phu Melo M.D.  Indications:  Steve José is a 71 year old year old male with a right  inguinal hernia that is causing  discomfort.  It was felt that it should be repaired.  Risks and complications were explained to the patient including bleeding, risk of anenesthesia, infection, recurrance of hernia, damage to bowel, bladder and  vessels to the testicle.  That mesh is a better long term repair, but the down side is that if it gets infected will need to be removed.  And since there  was a previous surgery in th area will be hard to find the ilioinguinal nerve.   Questions were answered and postoperative instructions were given to patient and any one with the patient.       Procedure:  The patient was brought to the OR, placed in supine position  after given  general anesthesia, the right  groin was prepped in sterile manner. After a time out, an Incision was made in the usual fashion with a knife and then blunt and sharp and cautery was used to dissect down to the external oblique.  The external oblique was opened in the direction of its fibers at the external canal with cautery and then the rest was mainly blunt dissection.  The external oblique was pulled back and the internal oblique layer was weak also. The hernia was following the spermatic cord and The spermatic cord was carefully freed of surrounding attachments and placed in a Penrose drain.  All attachments with the cremasteric muscles were taken down with cautery.  There was an obvious hernia seen within the spermatic cord, so it was more of an  indirect hernia, with  the defect involving the floor, so I felt that once we had that completely freed up and placed back inside the abdomen, I did close the defect, catching the internal oblique muscle and fascia with a continuous running 0 Vicryl suture, and catching as far down on the lateral aspect of the external oblique fascia as possible to leave some room to close over the spermatic cord.   I also used some of the cremaesteic tissue and sutured it to the floor before pulling the internal oblique tissue together.       After that was done, I was able to place a finger between my closure and the spermatic cord without much difficulty.  The  Right  12x8 cm ProGrip mesh was placed.  It was secured at the pubic tubercle with an 0-Vicryl suture at the marking on the mesh and then was laid on the floor, recreating the internal canal by bringing it around the spermatic cord.  It laid there  very nicely.  I was able to place a finger between the spermatic cord and the mesh, but it looks like it is going to cover the area well.  After that was done and laid nicely, the area was irrigated with antibiotic irrigation with good clean return.  No evidence of any bleeding.  The spermatic cord was then placed on top of the mesh.  I never saw  the nerve, but made sure that where I sutured did not involve the nerve.     Then, the external oblique fascia  Or what I could pull over the spermatic cord was closed with a continuous running 0 Vicryl suture, taking great care not to involve the nerve and closing over the spermatic cord.  The Hailee's fascia was brought together with several interrupted 3-0 Vicryl sutures, and skin was closed with continuous running 4-0 Monocryl, covered with tincture of Benzoin, Steri-Strips and a Tegaderm, along with an abdominal binder.  The patient did receive antibiotics preoperatively.       Plan is to discharge him home today.  Lift no more than 20 pounds for 6  weeks.  I will see him back in approximately 2 weeks.           ART MAC MD

## 2022-02-15 NOTE — PATIENT INSTRUCTIONS
Steve is a 71 year old male who is status post right inguinal hernia repair with had chills and fever. Was started on cipro for a testicular infection. No chills or fever recently.       Patient's  Pain is  improving.  Appetite is  improving.    Wound(s)  Is/are   clean dry and intact with no evidence of infection.  His left testicle is swollen and firm and tender. There is a lot of swelling in the canal area also. It was a lot of dissection.    Had a catheter in post op.  Patient did not have much pain for the first 3 days.  Then had more swelling I nthe right testicle but the swelling is better now than 5 days.        Questions were answered and discussion of no lifting more than 20 pounds for  3 weeks after surgery.    If patient has chills or fever let us know and can put back on the cipro.        Plan: follow up in 2 weeks  Will check with Dr. Sotelo if not improving.  Use antibiotic ointment on wound at night.

## 2022-02-15 NOTE — LETTER
2/15/2022         RE: Steve José  [address]  [place] [address]        Dear Colleague,    Thank you for referring your patient, Steve José, to the [facility]. Please see a copy of my visit note below.    Steve is a 71 year old male who is status post right inguinal hernia repair with had chills and fever. Was started on cipro for a testicular infection. No chills or fever recently.       Patient's  Pain is  improving.  Appetite is  improving.    Wound(s)  Is/are   clean dry and intact with no evidence of infection.  His left testicle is swollen and firm and tender. There is a lot of swelling in the canal area also. It was a lot of dissection.    Had a catheter in post op.  Patient did not have much pain for the first 3 days.  Then had more swelling I nthe right testicle but the swelling is better now than 5 days.        Questions were answered and discussion of no lifting more than 20 pounds for  3 weeks after surgery.    If patient has chills or fever let us know and can put back on the cipro.        Plan: follow up in 2 weeks  Will check with Dr. Sotelo if not improving.  Use antibiotic ointment on wound at night.     Time spent with the patient with greater that 50% of the time in discussion was 15 minutes.  Phu Melo MD           OPERATIVE REPORT     February 2, 2022  Preoperative diagnosis: right  Inguinal hernia.  Postoperative diagnosis:  same with very weak floor and had a previous incision just above this area  Procedure:  Right  Inguinal hernia repair with right  ProGrip 12 by 8 cm  Anesthesia:  General anesthesia with 0.25% marcaine placed thru out the procedure.   Blood loss: 10 cc  Specimen: none sent  Surgeon : Phu Melo M.D.  Indications:  Steve José is a 71 year old year old male with a right  inguinal hernia that is causing  discomfort.  It was felt that it should be repaired.  Risks and complications were explained to the patient  including bleeding, risk of anenesthesia, infection, recurrance of hernia, damage to bowel, bladder and  vessels to the testicle.  That mesh is a better long term repair, but the down side is that if it gets infected will need to be removed.  And since there was a previous surgery in th area will be hard to find the ilioinguinal nerve.   Questions were answered and postoperative instructions were given to patient and any one with the patient.       Procedure:  The patient was brought to the OR, placed in supine position  after given  general anesthesia, the right  groin was prepped in sterile manner. After a time out, an Incision was made in the usual fashion with a knife and then blunt and sharp and cautery was used to dissect down to the external oblique.  The external oblique was opened in the direction of its fibers at the external canal with cautery and then the rest was mainly blunt dissection.  The external oblique was pulled back and the internal oblique layer was weak also. The hernia was following the spermatic cord and The spermatic cord was carefully freed of surrounding attachments and placed in a Penrose drain.  All attachments with the cremasteric muscles were taken down with cautery.  There was an obvious hernia seen within the spermatic cord, so it was more of an  indirect hernia, with  the defect involving the floor, so I felt that once we had that completely freed up and placed back inside the abdomen, I did close the defect, catching the internal oblique muscle and fascia with a continuous running 0 Vicryl suture, and catching as far down on the lateral aspect of the external oblique fascia as possible to leave some room to close over the spermatic cord.   I also used some of the cremaesteic tissue and sutured it to the floor before pulling the internal oblique tissue together.       After that was done, I was able to place a finger between my closure and the spermatic cord without much  difficulty.  The  Right  12x8 cm ProGrip mesh was placed.  It was secured at the pubic tubercle with an 0-Vicryl suture at the marking on the mesh and then was laid on the floor, recreating the internal canal by bringing it around the spermatic cord.  It laid there very nicely.  I was able to place a finger between the spermatic cord and the mesh, but it looks like it is going to cover the area well.  After that was done and laid nicely, the area was irrigated with antibiotic irrigation with good clean return.  No evidence of any bleeding.  The spermatic cord was then placed on top of the mesh.  I never saw  the nerve, but made sure that where I sutured did not involve the nerve.     Then, the external oblique fascia  Or what I could pull over the spermatic cord was closed with a continuous running 0 Vicryl suture, taking great care not to involve the nerve and closing over the spermatic cord.  The Hailee's fascia was brought together with several interrupted 3-0 Vicryl sutures, and skin was closed with continuous running 4-0 Monocryl, covered with tincture of Benzoin, Steri-Strips and a Tegaderm, along with an abdominal binder.  The patient did receive antibiotics preoperatively.       Plan is to discharge him home today.  Lift no more than 20 pounds for 6  weeks.  I will see him back in approximately 2 weeks.           PHU MELO MD              Again, thank you for allowing me to participate in the care of your patient.        Sincerely,        Phu Melo MD

## 2022-03-01 ENCOUNTER — OFFICE VISIT (OUTPATIENT)
Dept: SURGERY | Facility: CLINIC | Age: 72
End: 2022-03-01
Payer: COMMERCIAL

## 2022-03-01 VITALS
BODY MASS INDEX: 25.28 KG/M2 | HEART RATE: 63 BPM | DIASTOLIC BLOOD PRESSURE: 69 MMHG | OXYGEN SATURATION: 100 % | SYSTOLIC BLOOD PRESSURE: 130 MMHG | WEIGHT: 186.4 LBS

## 2022-03-01 DIAGNOSIS — Z98.890 S/P RIGHT INGUINAL HERNIORRHAPHY: Primary | ICD-10-CM

## 2022-03-01 DIAGNOSIS — Z87.19 S/P RIGHT INGUINAL HERNIORRHAPHY: Primary | ICD-10-CM

## 2022-03-01 PROCEDURE — 99024 POSTOP FOLLOW-UP VISIT: CPT | Performed by: SURGERY

## 2022-03-01 NOTE — LETTER
3/1/2022         RE: Steve José  [address] [place] [address]        Dear Colleague,    Thank you for referring your patient, Steve José, to the [facility]. Please see a copy of my visit note below.    Steve is a 71 year old male who is status post right inguinal hernia repair and was started on cipro for an infectionin his testicle as had a batres in for about a week.  with no chills and no fever.      Patient's  Pain is  improving.  Appetite is  improving.    Wound(s)  Is/are   clean dry and intact with no evidence of infection.    Questions were answered and discussion of no lifting more than 20 pounds for 6 weeks after surgery.    His swelling is down but still very swollen on the incision down to the right testicle.     Plan: follow up as needed.  Call  if not improving in the next 2 to 3  months.       Time spent with the patient with greater that 50% of the time in discussion was 30 minutes.  Phu Melo MD          Progress Notes  Phu Melo MD (Physician)     Surgery  Steve is a 71 year old male who is status post right inguinal hernia repair with had chills and fever. Was started on cipro for a testicular infection. No chills or fever recently.        Patient's  Pain is  improving.  Appetite is  improving.     Wound(s)  Is/are   clean dry and intact with no evidence of infection.  His left testicle is swollen and firm and tender. There is a lot of swelling in the canal area also. It was a lot of dissection.    Had a catheter in post op.  Patient did not have much pain for the first 3 days.  Then had more swelling I nthe right testicle but the swelling is better now than 5 days.          Questions were answered and discussion of no lifting more than 20 pounds for  3 weeks after surgery.     If patient has chills or fever let us know and can put back on the cipro.          Plan: follow up in 2 weeks  Will check with Dr. Sotelo if not  improving.  Use antibiotic ointment on wound at night.      Time spent with the patient with greater that 50% of the time in discussion was 15 minutes.  Phu Melo MD              OPERATIVE REPORT     February 2, 2022  Preoperative diagnosis: right  Inguinal hernia.  Postoperative diagnosis:  same with very weak floor and had a previous incision just above this area  Procedure:  Right  Inguinal hernia repair with right  ProGrip 12 by 8 cm  Anesthesia:  General anesthesia with 0.25% marcaine placed thru out the procedure.   Blood loss: 10 cc  Specimen: none sent  Surgeon : Phu Melo M.D.  Indications:  Steve José is a 71 year old year old male with a right  inguinal hernia that is causing  discomfort.  It was felt that it should be repaired.  Risks and complications were explained to the patient including bleeding, risk of anenesthesia, infection, recurrance of hernia, damage to bowel, bladder and  vessels to the testicle.  That mesh is a better long term repair, but the down side is that if it gets infected will need to be removed.  And since there was a previous surgery in th area will be hard to find the ilioinguinal nerve.   Questions were answered and postoperative instructions were given to patient and any one with the patient.       Procedure:  The patient was brought to the OR, placed in supine position  after given  general anesthesia, the right  groin was prepped in sterile manner. After a time out, an Incision was made in the usual fashion with a knife and then blunt and sharp and cautery was used to dissect down to the external oblique.  The external oblique was opened in the direction of its fibers at the external canal with cautery and then the rest was mainly blunt dissection.  The external oblique was pulled back and the internal oblique layer was weak also. The hernia was following the spermatic cord and The spermatic cord was carefully freed of surrounding attachments and placed in a  Penrose drain.  All attachments with the cremasteric muscles were taken down with cautery.  There was an obvious hernia seen within the spermatic cord, so it was more of an  indirect hernia, with  the defect involving the floor, so I felt that once we had that completely freed up and placed back inside the abdomen, I did close the defect, catching the internal oblique muscle and fascia with a continuous running 0 Vicryl suture, and catching as far down on the lateral aspect of the external oblique fascia as possible to leave some room to close over the spermatic cord.   I also used some of the cremaesteic tissue and sutured it to the floor before pulling the internal oblique tissue together.       After that was done, I was able to place a finger between my closure and the spermatic cord without much difficulty.  The  Right  12x8 cm ProGrip mesh was placed.  It was secured at the pubic tubercle with an 0-Vicryl suture at the marking on the mesh and then was laid on the floor, recreating the internal canal by bringing it around the spermatic cord.  It laid there very nicely.  I was able to place a finger between the spermatic cord and the mesh, but it looks like it is going to cover the area well.  After that was done and laid nicely, the area was irrigated with antibiotic irrigation with good clean return.  No evidence of any bleeding.  The spermatic cord was then placed on top of the mesh.  I never saw  the nerve, but made sure that where I sutured did not involve the nerve.     Then, the external oblique fascia  Or what I could pull over the spermatic cord was closed with a continuous running 0 Vicryl suture, taking great care not to involve the nerve and closing over the spermatic cord.  The Hailee's fascia was brought together with several interrupted 3-0 Vicryl sutures, and skin was closed with continuous running 4-0 Monocryl, covered with tincture of Benzoin, Steri-Strips and a Tegaderm, along with an  abdominal binder.  The patient did receive antibiotics preoperatively.       Plan is to discharge him home today.  Lift no more than 20 pounds for 6  weeks.  I will see him back in approximately 2 weeks.                             Again, thank you for allowing me to participate in the care of your patient.        Sincerely,        Phu Melo MD

## 2022-03-01 NOTE — PATIENT INSTRUCTIONS
Steve is a 71 year old male who is status post right inguinal hernia repair and was started on cipro for an infectionin his testicle as had a batres in for about a week.  with no chills and no fever.      Patient's  Pain is  improving.  Appetite is  improving.    Wound(s)  Is/are   clean dry and intact with no evidence of infection.    Questions were answered and discussion of no lifting more than 20 pounds for 6 weeks after surgery.    His swelling is down but still very swollen on the incision down to the right testicle.     Plan: follow up as needed.  Call  if not improving in the next 2 to 3  months.

## 2022-03-01 NOTE — PROGRESS NOTES
Steve is a 71 year old male who is status post right inguinal hernia repair and was started on cipro for an infectionin his testicle as had a batres in for about a week.  with no chills and no fever.      Patient's  Pain is  improving.  Appetite is  improving.    Wound(s)  Is/are   clean dry and intact with no evidence of infection.    Questions were answered and discussion of no lifting more than 20 pounds for 6 weeks after surgery.    His swelling is down but still very swollen on the incision down to the right testicle.     Plan: follow up as needed.  Call  if not improving in the next 2 to 3  months.       Time spent with the patient with greater that 50% of the time in discussion was 30 minutes.  Phu Melo MD          Progress Notes  Phu Melo MD (Physician)     Surgery  Steve is a 71 year old male who is status post right inguinal hernia repair with had chills and fever. Was started on cipro for a testicular infection. No chills or fever recently.        Patient's  Pain is  improving.  Appetite is  improving.     Wound(s)  Is/are   clean dry and intact with no evidence of infection.  His left testicle is swollen and firm and tender. There is a lot of swelling in the canal area also. It was a lot of dissection.    Had a catheter in post op.  Patient did not have much pain for the first 3 days.  Then had more swelling I nthe right testicle but the swelling is better now than 5 days.          Questions were answered and discussion of no lifting more than 20 pounds for  3 weeks after surgery.     If patient has chills or fever let us know and can put back on the cipro.          Plan: follow up in 2 weeks  Will check with Dr. Sotelo if not improving.  Use antibiotic ointment on wound at night.      Time spent with the patient with greater that 50% of the time in discussion was 15 minutes.  Phu Melo MD              OPERATIVE REPORT     February 2, 2022  Preoperative  diagnosis: right  Inguinal hernia.  Postoperative diagnosis:  same with very weak floor and had a previous incision just above this area  Procedure:  Right  Inguinal hernia repair with right  ProGrip 12 by 8 cm  Anesthesia:  General anesthesia with 0.25% marcaine placed thru out the procedure.   Blood loss: 10 cc  Specimen: none sent  Surgeon : Phu Melo M.D.  Indications:  Steve José is a 71 year old year old male with a right  inguinal hernia that is causing  discomfort.  It was felt that it should be repaired.  Risks and complications were explained to the patient including bleeding, risk of anenesthesia, infection, recurrance of hernia, damage to bowel, bladder and  vessels to the testicle.  That mesh is a better long term repair, but the down side is that if it gets infected will need to be removed.  And since there was a previous surgery in th area will be hard to find the ilioinguinal nerve.   Questions were answered and postoperative instructions were given to patient and any one with the patient.       Procedure:  The patient was brought to the OR, placed in supine position  after given  general anesthesia, the right  groin was prepped in sterile manner. After a time out, an Incision was made in the usual fashion with a knife and then blunt and sharp and cautery was used to dissect down to the external oblique.  The external oblique was opened in the direction of its fibers at the external canal with cautery and then the rest was mainly blunt dissection.  The external oblique was pulled back and the internal oblique layer was weak also. The hernia was following the spermatic cord and The spermatic cord was carefully freed of surrounding attachments and placed in a Penrose drain.  All attachments with the cremasteric muscles were taken down with cautery.  There was an obvious hernia seen within the spermatic cord, so it was more of an  indirect hernia, with  the defect involving the floor, so I  felt that once we had that completely freed up and placed back inside the abdomen, I did close the defect, catching the internal oblique muscle and fascia with a continuous running 0 Vicryl suture, and catching as far down on the lateral aspect of the external oblique fascia as possible to leave some room to close over the spermatic cord.   I also used some of the cremaesteic tissue and sutured it to the floor before pulling the internal oblique tissue together.       After that was done, I was able to place a finger between my closure and the spermatic cord without much difficulty.  The  Right  12x8 cm ProGrip mesh was placed.  It was secured at the pubic tubercle with an 0-Vicryl suture at the marking on the mesh and then was laid on the floor, recreating the internal canal by bringing it around the spermatic cord.  It laid there very nicely.  I was able to place a finger between the spermatic cord and the mesh, but it looks like it is going to cover the area well.  After that was done and laid nicely, the area was irrigated with antibiotic irrigation with good clean return.  No evidence of any bleeding.  The spermatic cord was then placed on top of the mesh.  I never saw  the nerve, but made sure that where I sutured did not involve the nerve.     Then, the external oblique fascia  Or what I could pull over the spermatic cord was closed with a continuous running 0 Vicryl suture, taking great care not to involve the nerve and closing over the spermatic cord.  The Hailee's fascia was brought together with several interrupted 3-0 Vicryl sutures, and skin was closed with continuous running 4-0 Monocryl, covered with tincture of Benzoin, Steri-Strips and a Tegaderm, along with an abdominal binder.  The patient did receive antibiotics preoperatively.       Plan is to discharge him home today.  Lift no more than 20 pounds for 6  weeks.  I will see him back in approximately 2 weeks.

## 2022-03-12 ENCOUNTER — HEALTH MAINTENANCE LETTER (OUTPATIENT)
Age: 72
End: 2022-03-12

## 2022-04-19 ENCOUNTER — OFFICE VISIT (OUTPATIENT)
Dept: UROLOGY | Facility: CLINIC | Age: 72
End: 2022-04-19
Payer: COMMERCIAL

## 2022-04-19 VITALS
DIASTOLIC BLOOD PRESSURE: 87 MMHG | HEART RATE: 61 BPM | SYSTOLIC BLOOD PRESSURE: 156 MMHG | TEMPERATURE: 97.7 F | OXYGEN SATURATION: 99 %

## 2022-04-19 DIAGNOSIS — I10 HYPERTENSION GOAL BP (BLOOD PRESSURE) < 140/90: ICD-10-CM

## 2022-04-19 DIAGNOSIS — R97.20 ELEVATED PROSTATE SPECIFIC ANTIGEN (PSA): Primary | ICD-10-CM

## 2022-04-19 DIAGNOSIS — R33.8 BENIGN PROSTATIC HYPERPLASIA WITH URINARY RETENTION: ICD-10-CM

## 2022-04-19 DIAGNOSIS — N45.2 ORCHITIS: ICD-10-CM

## 2022-04-19 DIAGNOSIS — N40.1 BENIGN PROSTATIC HYPERPLASIA WITH URINARY RETENTION: ICD-10-CM

## 2022-04-19 LAB
ALBUMIN UR-MCNC: NEGATIVE MG/DL
APPEARANCE UR: CLEAR
BILIRUB UR QL STRIP: NEGATIVE
COLOR UR AUTO: YELLOW
GLUCOSE UR STRIP-MCNC: NEGATIVE MG/DL
HGB UR QL STRIP: NEGATIVE
KETONES UR STRIP-MCNC: NEGATIVE MG/DL
LEUKOCYTE ESTERASE UR QL STRIP: NEGATIVE
NITRATE UR QL: NEGATIVE
PH UR STRIP: 7 [PH] (ref 5–7)
SP GR UR STRIP: 1.01 (ref 1–1.03)
UROBILINOGEN UR STRIP-ACNC: 0.2 E.U./DL

## 2022-04-19 PROCEDURE — 81003 URINALYSIS AUTO W/O SCOPE: CPT | Performed by: UROLOGY

## 2022-04-19 PROCEDURE — 99213 OFFICE O/P EST LOW 20 MIN: CPT | Mod: 25 | Performed by: UROLOGY

## 2022-04-19 PROCEDURE — 51798 US URINE CAPACITY MEASURE: CPT | Performed by: UROLOGY

## 2022-04-19 NOTE — PROGRESS NOTES
Chief Complaint   Patient presents with     RECHECK       Steve ZACARIAS Arnav is a 71 year old male who presents today for follow up of   Chief Complaint   Patient presents with     RECHECK    f/u for benign prostatic hyperplasia/elevated psa with recent h/o orchitis (s/p inguinal hernis).  He is doing well without any complaints.  He denies any scrotal pain.  He has no LUTS.    Current Outpatient Medications   Medication Sig Dispense Refill     amLODIPine (NORVASC) 2.5 MG tablet Take 1 tablet (2.5 mg) by mouth daily 90 tablet 3     atenolol (TENORMIN) 25 MG tablet TAKE 2 TABLETS BY MOUTH IN THE MORNING AND 1 IN THE EVENING DAILY 270 tablet 2     ciprofloxacin (CIPRO) 500 MG tablet Take 1 tablet (500 mg) by mouth 2 times daily 20 tablet 0     famotidine (PEPCID) 10 MG tablet Take 10 mg by mouth 2 times daily       finasteride (PROSCAR) 5 MG tablet Take 1 tablet (5 mg) by mouth daily 90 tablet 3     hydrochlorothiazide (HYDRODIURIL) 25 MG tablet TAKE 1 TABLET BY MOUTH ONCE DAILY . APPOINTMENT REQUIRED FOR FUTURE REFILLS 90 tablet 3     Multiple Vitamins-Minerals (MULTI FOR HIM PO) Take  by mouth daily.       potassium chloride ER (KLOR-CON M) 10 MEQ CR tablet TAKE 1  BY MOUTH ONCE DAILY . APPOINTMENT REQUIRED FOR FUTURE REFILLS 90 tablet 3     simvastatin (ZOCOR) 10 MG tablet TAKE 1 TABLET BY MOUTH AT BEDTIME . APPOINTMENT REQUIRED FOR FUTURE REFILLS 90 tablet 3     tamsulosin (FLOMAX) 0.4 MG capsule Take 1 capsule (0.4 mg) by mouth At Bedtime 90 capsule 3     tiZANidine (ZANAFLEX) 4 MG tablet Take 1 tablet by mouth three times daily as needed for muscle spasm 30 tablet 3     No Known Allergies   Past Medical History:   Diagnosis Date     BPH (benign prostatic hypertrophy)      Chronic low back pain 2001     Colon polyp 2000     Hyperlipidemia      Hypertension      Prostatitis     recurrent     Past Surgical History:   Procedure Laterality Date     ABDOMEN SURGERY  1978    Right ureter reimplantation     APPENDECTOMY        BIOPSY  five times    Prostate     COLONOSCOPY  1/6/2018     HRW CORNEAL TRANSPLANT, CRNA       LASIK  2005     TURP  1998    x 2     ZZC GENITAL SURG PROC,MALE UNLISTED  2001    left hydrocele     ZZC GENITAL SURG PROC,MALE UNLISTED  1977    right ureteral reimplantation     Family History   Problem Relation Age of Onset     C.A.D. Father 64        d. MI     Hypertension Father      Hypertension Mother      Cancer Mother         skin     Diabetes No family hx of      Cancer - colorectal No family hx of      Social History     Socioeconomic History     Marital status:      Spouse name: Amy      Number of children: 0     Years of education: 15     Highest education level: None   Occupational History     Occupation: printing company sales     Employer: RETIRED   Tobacco Use     Smoking status: Never Smoker     Smokeless tobacco: Never Used   Substance and Sexual Activity     Alcohol use: Yes     Alcohol/week: 0.0 standard drinks     Comment: rare     Drug use: No     Sexual activity: Yes     Partners: Female     Birth control/protection: None   Other Topics Concern     Parent/sibling w/ CABG, MI or angioplasty before 65F 55M? No      Service No     Blood Transfusions No     Comment: unsure     Caffeine Concern No     Occupational Exposure No     Hobby Hazards No     Sleep Concern No     Stress Concern No     Weight Concern No     Special Diet No     Comment: watches intake of salt and sugar     Back Care No     Exercise Yes     Comment: everyday goes for mile and a half to two mile walk     Bike Helmet No     Seat Belt Yes     Self-Exams Yes       REVIEW OF SYSTEMS  =================  C: NEGATIVE for fever, chills, change in weight  I: NEGATIVE for worrisome rashes, moles or lesions  E/M: NEGATIVE for ear, mouth and throat problems  R: NEGATIVE for significant cough or SHORTNESS OF BREATH  CV:  NEGATIVE for chest pain, palpitations or peripheral edema  GI: NEGATIVE for nausea, abdominal pain,  heartburn, or change in bowel habits  NEURO: NEGATIVE numbness/weakness  : see HPI  PSYCH: NEGATIVE depression/anxiety  LYmph: no new enlarged lymph nodes  Ortho: no new trauma/movements    Physical Exam:  BP (!) 156/87 (BP Location: Right arm, Patient Position: Chair, Cuff Size: Adult Large)   Pulse 61   Temp 97.7  F (36.5  C)   SpO2 99%    Patient is pleasant, in no acute distress, good general condition.  Lung: no evidence of respiratory distress    Abdomen: Soft, nondistended, non tender. No masses. No rebound or guarding.   Exam: bladder scan zero ml.  Normal male  Skin: Warm and dry.  No redness.  Psych: normal mood and affect  Neuro: alert and oriented  Musculaskeletal: moving all extremities    Assessment/Plan:   (R97.20) Elevated prostate specific antigen (PSA)  (primary encounter diagnosis)  Comment:     Plan: psa in 3 months    (N40.1,  R33.8) Benign prostatic hyperplasia with urinary retention  Comment:    Plan: cont with flomax/proscar    (N45.2) Orchitis  Comment: resolved  Plan: prn.    HTN: Steve to follow up with Primary Care provider regarding elevated blood pressure.

## 2022-04-24 ASSESSMENT — ENCOUNTER SYMPTOMS
MYALGIAS: 0
FEVER: 0
DIZZINESS: 0
JOINT SWELLING: 0
SORE THROAT: 0
COUGH: 0
NAUSEA: 0
NERVOUS/ANXIOUS: 0
EYE PAIN: 0
PARESTHESIAS: 0
ARTHRALGIAS: 0
FREQUENCY: 0
CONSTIPATION: 0
HEADACHES: 0
WEAKNESS: 0
DIARRHEA: 0
DYSURIA: 0
ABDOMINAL PAIN: 0
HEARTBURN: 0
HEMATOCHEZIA: 0
SHORTNESS OF BREATH: 0
CHILLS: 0
PALPITATIONS: 0
HEMATURIA: 0

## 2022-04-24 ASSESSMENT — ACTIVITIES OF DAILY LIVING (ADL): CURRENT_FUNCTION: NO ASSISTANCE NEEDED

## 2022-04-27 ENCOUNTER — OFFICE VISIT (OUTPATIENT)
Dept: FAMILY MEDICINE | Facility: CLINIC | Age: 72
End: 2022-04-27
Payer: COMMERCIAL

## 2022-04-27 VITALS
WEIGHT: 186.6 LBS | OXYGEN SATURATION: 100 % | BODY MASS INDEX: 25.27 KG/M2 | DIASTOLIC BLOOD PRESSURE: 78 MMHG | HEIGHT: 72 IN | TEMPERATURE: 98.3 F | SYSTOLIC BLOOD PRESSURE: 138 MMHG | HEART RATE: 59 BPM

## 2022-04-27 DIAGNOSIS — G89.29 CHRONIC RIGHT-SIDED LOW BACK PAIN, UNSPECIFIED WHETHER SCIATICA PRESENT: ICD-10-CM

## 2022-04-27 DIAGNOSIS — M54.50 CHRONIC RIGHT-SIDED LOW BACK PAIN, UNSPECIFIED WHETHER SCIATICA PRESENT: ICD-10-CM

## 2022-04-27 DIAGNOSIS — Z00.00 ENCOUNTER FOR MEDICARE ANNUAL WELLNESS EXAM: Primary | ICD-10-CM

## 2022-04-27 DIAGNOSIS — N13.8 HYPERTROPHY OF PROSTATE WITH URINARY OBSTRUCTION: ICD-10-CM

## 2022-04-27 DIAGNOSIS — N40.1 HYPERTROPHY OF PROSTATE WITH URINARY OBSTRUCTION: ICD-10-CM

## 2022-04-27 DIAGNOSIS — E78.5 HYPERLIPIDEMIA LDL GOAL <130: ICD-10-CM

## 2022-04-27 PROBLEM — K40.90 RIGHT INGUINAL HERNIA: Status: RESOLVED | Noted: 2022-01-11 | Resolved: 2022-04-27

## 2022-04-27 PROCEDURE — 99397 PER PM REEVAL EST PAT 65+ YR: CPT | Performed by: FAMILY MEDICINE

## 2022-04-27 PROCEDURE — 99213 OFFICE O/P EST LOW 20 MIN: CPT | Mod: 25 | Performed by: FAMILY MEDICINE

## 2022-04-27 RX ORDER — GABAPENTIN 100 MG/1
100 CAPSULE ORAL 3 TIMES DAILY
Qty: 30 CAPSULE | Refills: 3 | Status: SHIPPED | OUTPATIENT
Start: 2022-04-27 | End: 2023-04-10

## 2022-04-27 ASSESSMENT — ENCOUNTER SYMPTOMS
HEADACHES: 0
FREQUENCY: 0
HEARTBURN: 0
DIARRHEA: 0
MYALGIAS: 0
ARTHRALGIAS: 0
FEVER: 0
HEMATURIA: 0
WEAKNESS: 0
HEMATOCHEZIA: 0
CHILLS: 0
EYE PAIN: 0
SORE THROAT: 0
CONSTIPATION: 0
ABDOMINAL PAIN: 0
PALPITATIONS: 0
SHORTNESS OF BREATH: 0
COUGH: 0
NAUSEA: 0
NERVOUS/ANXIOUS: 0
JOINT SWELLING: 0
DYSURIA: 0
DIZZINESS: 0
PARESTHESIAS: 0

## 2022-04-27 ASSESSMENT — PAIN SCALES - GENERAL: PAINLEVEL: NO PAIN (0)

## 2022-04-27 ASSESSMENT — ACTIVITIES OF DAILY LIVING (ADL): CURRENT_FUNCTION: NO ASSISTANCE NEEDED

## 2022-04-27 NOTE — PROGRESS NOTES
"SUBJECTIVE:   Steve José is a 71 year old male who presents for Preventive Visit.        Are you in the first 12 months of your Medicare coverage?  No    Healthy Habits:     In general, how would you rate your overall health?  Good    Frequency of exercise:  6-7 days/week    Duration of exercise:  45-60 minutes    Do you usually eat at least 4 servings of fruit and vegetables a day, include whole grains    & fiber and avoid regularly eating high fat or \"junk\" foods?  No    Taking medications regularly:  Yes    Medication side effects:  None    Ability to successfully perform activities of daily living:  No assistance needed    Home Safety:  Lack of grab bars in the bathroom    Hearing Impairment:  No hearing concerns    In the past 6 months, have you been bothered by leaking of urine?  No    In general, how would you rate your overall mental or emotional health?  Excellent      PHQ-2 Total Score: 0    Additional concerns today:  No    Do you feel safe in your environment? Yes    Have you ever done Advance Care Planning? (For example, a Health Directive, POLST, or a discussion with a medical provider or your loved ones about your wishes): No, advance care planning information given to patient to review.  Advanced care planning was discussed at today's visit.      Fall risk  Fallen 2 or more times in the past year?: No  Any fall with injury in the past year?: No    Cognitive Screening   1) Repeat 3 items (Leader, Season, Table)    2) Clock draw: NORMAL  3) 3 item recall: Recalls 3 objects  Results: 3 items recalled: COGNITIVE IMPAIRMENT LESS LIKELY    Mini-CogTM Copyright RELL Mayes. Licensed by the author for use in [facility]; reprinted with permission (anirudh@.[facility]). All rights reserved.      Do you have sleep apnea, excessive snoring or daytime drowsiness?: no    Reviewed and updated as needed this visit by clinical staff   Tobacco  Allergies  Meds  Problems  Med Hx  Surg Hx  Fam Hx        "     Reviewed and updated as needed this visit by Provider   Tobacco  Allergies  Meds  Problems  Med Hx  Surg Hx  Fam Hx           Social History     Tobacco Use     Smoking status: Never Smoker     Smokeless tobacco: Never Used   Substance Use Topics     Alcohol use: Not Currently     Comment: rare         Alcohol Use 4/24/2022   Prescreen: >3 drinks/day or >7 drinks/week? No   Prescreen: >3 drinks/day or >7 drinks/week? -           Hyperlipidemia Follow-Up      Are you regularly taking any medication or supplement to lower your cholesterol?   Yes- statins    Are you having muscle aches or other side effects that you think could be caused by your cholesterol lowering medication?  No    Hypertension Follow-up      Do you check your blood pressure regularly outside of the clinic? No     Are you following a low salt diet? Yes    Are your blood pressures ever more than 140 on the top number (systolic) OR more   than 90 on the bottom number (diastolic), for example 140/90? No    Pt has chronic Low back pain and wants refill of Tizanidine  Current providers sharing in care for this patient include:   Patient Care Team:  Bita Deluca MD as PCP - General  Bita Deluca MD as Assigned PCP  Johanne Dunn PA-C as Referring Physician (Family Medicine)  Talya Barlow MD as MD (Dermatology)  Semaj Sotelo MD as Assigned Surgical Provider    The following health maintenance items are reviewed in Epic and correct as of today:  Health Maintenance Due   Topic Date Due     AORTIC ANEURYSM SCREENING (SYSTEM ASSIGNED)  Never done     Lab work is in process  Labs reviewed in EPIC  BP Readings from Last 3 Encounters:   04/27/22 138/78   04/19/22 (!) 156/87   03/01/22 130/69    Wt Readings from Last 3 Encounters:   04/27/22 84.6 kg (186 lb 9.6 oz)   03/01/22 84.6 kg (186 lb 6.4 oz)   02/02/22 83.9 kg (185 lb)                  Patient Active Problem List   Diagnosis     GERD (gastroesophageal reflux disease)      Hyperlipidemia LDL goal <130     Hypertension goal BP (blood pressure) < 140/90     Advanced directives, counseling/discussion     Hypertrophy of prostate with urinary obstruction     Chronic right-sided low back pain, unspecified whether sciatica present     Past Surgical History:   Procedure Laterality Date     ABDOMEN SURGERY  1978    Right ureter reimplantation     APPENDECTOMY       BIOPSY  five times    Prostate     COLONOSCOPY  1/6/2018     HERNIORRHAPHY INGUINAL Right 2/2/2022    Procedure: OPEN RIGHT INGUINAL HERNIORRHAPHY WITH MESH;  Surgeon: Phu Melo MD;  Location: [facility] OR     HRW CORNEAL TRANSPLANT, CRNA       LASIK  2005     TURP  1998    x 2     ZC GENITAL SURG PROC,MALE UNLISTED  2001    left hydrocele     [facility] GENITAL SURG PROC,MALE UNLISTED  1977    right ureteral reimplantation       Social History     Tobacco Use     Smoking status: Never Smoker     Smokeless tobacco: Never Used   Substance Use Topics     Alcohol use: Not Currently     Comment: rare     Family History   Problem Relation Age of Onset     C.A.D. Father 64        d. MI     Hypertension Father      Hypertension Mother      Cancer Mother         skin     Cerebrovascular Disease Mother      Diabetes No family hx of      Cancer - colorectal No family hx of          Current Outpatient Medications   Medication Sig Dispense Refill     amLODIPine (NORVASC) 2.5 MG tablet Take 1 tablet (2.5 mg) by mouth daily 90 tablet 3     atenolol (TENORMIN) 25 MG tablet TAKE 2 TABLETS BY MOUTH IN THE MORNING AND 1 IN THE EVENING DAILY 270 tablet 2     famotidine (PEPCID) 10 MG tablet Take 10 mg by mouth 2 times daily       finasteride (PROSCAR) 5 MG tablet Take 1 tablet (5 mg) by mouth daily 90 tablet 3     gabapentin (NEURONTIN) 100 MG capsule Take 1 capsule (100 mg) by mouth 3 times daily 30 capsule 3     hydrochlorothiazide (HYDRODIURIL) 25 MG tablet TAKE 1 TABLET BY MOUTH ONCE DAILY . APPOINTMENT REQUIRED FOR FUTURE REFILLS 90 tablet 3      Multiple Vitamins-Minerals (MULTI FOR HIM PO) Take  by mouth daily.       potassium chloride ER (KLOR-CON M) 10 MEQ CR tablet TAKE 1  BY MOUTH ONCE DAILY . APPOINTMENT REQUIRED FOR FUTURE REFILLS 90 tablet 3     simvastatin (ZOCOR) 10 MG tablet TAKE 1 TABLET BY MOUTH AT BEDTIME . APPOINTMENT REQUIRED FOR FUTURE REFILLS 90 tablet 3     tamsulosin (FLOMAX) 0.4 MG capsule Take 1 capsule (0.4 mg) by mouth At Bedtime 90 capsule 3     tiZANidine (ZANAFLEX) 4 MG tablet Take 1 tablet by mouth three times daily as needed for muscle spasm 30 tablet 0     No Known Allergies  Recent Labs   Lab Test 01/24/22  1154 02/08/21  0941 04/23/20  1044 04/08/19  0927 10/22/18  0912 10/08/15  0927 04/17/15  0822   A1C  --   --   --   --   --   --  5.8   LDL 62 70 67 55 67   < >  --    HDL 63 64 70 66 60   < >  --    TRIG 58 70 56 62 60   < >  --    ALT  --   --  28 23 26   < >  --    CR 0.78 0.86 0.86 0.90 0.86   < > 0.80   GFRESTIMATED >90 86 87 86 88   < > >90  Non  GFR Calc     GFRESTBLACK  --  >90 >90 >90 >90   < > >90  African American GFR Calc     POTASSIUM 4.2 4.1 4.4 4.0 3.9   < > 3.8    < > = values in this interval not displayed.              Review of Systems   Constitutional: Negative for chills and fever.   HENT: Negative for congestion, ear pain, hearing loss and sore throat.    Eyes: Negative for pain and visual disturbance.   Respiratory: Negative for cough and shortness of breath.    Cardiovascular: Negative for chest pain, palpitations and peripheral edema.   Gastrointestinal: Negative for abdominal pain, constipation, diarrhea, heartburn, hematochezia and nausea.   Genitourinary: Negative for dysuria, frequency, genital sores, hematuria, impotence, penile discharge and urgency.   Musculoskeletal: Negative for arthralgias, joint swelling and myalgias.   Skin: Negative for rash.   Neurological: Negative for dizziness, weakness, headaches and paresthesias.   Psychiatric/Behavioral: Negative for mood  changes. The patient is not nervous/anxious.      CONSTITUTIONAL: NEGATIVE for fever, chills, change in weight  INTEGUMENTARY/SKIN: NEGATIVE for worrisome rashes, moles or lesions  EYES: NEGATIVE for vision changes or irritation  ENT/MOUTH: NEGATIVE for ear, mouth and throat problems  RESP: NEGATIVE for significant cough or SOB  BREAST: NEGATIVE for masses, tenderness or discharge  CV: NEGATIVE for chest pain, palpitations or peripheral edema  GI: NEGATIVE for nausea, abdominal pain, heartburn, or change in bowel habits  : NEGATIVE for frequency, dysuria, or hematuria  MUSCULOSKELETAL: NEGATIVE for significant arthralgias or myalgia  NEURO: NEGATIVE for weakness, dizziness or paresthesias  ENDOCRINE: NEGATIVE for temperature intolerance, skin/hair changes  HEME: NEGATIVE for bleeding problems  PSYCHIATRIC: NEGATIVE for changes in mood or affect    OBJECTIVE:   /78   Pulse 59   Temp 98.3  F (36.8  C) (Temporal)   Ht 1.829 m (6')   Wt 84.6 kg (186 lb 9.6 oz)   SpO2 100%   BMI 25.31 kg/m   Estimated body mass index is 25.31 kg/m  as calculated from the following:    Height as of this encounter: 1.829 m (6').    Weight as of this encounter: 84.6 kg (186 lb 9.6 oz).  Physical Exam  GENERAL: healthy, alert and no distress  EYES: Eyes grossly normal to inspection, PERRL and conjunctivae and sclerae normal  HENT: ear canals and TM's normal, nose and mouth without ulcers or lesions  NECK: no adenopathy, no asymmetry, masses, or scars and thyroid normal to palpation  RESP: lungs clear to auscultation - no rales, rhonchi or wheezes  CV: regular rate and rhythm, normal S1 S2, no S3 or S4, no murmur, click or rub, no peripheral edema and peripheral pulses strong  ABDOMEN: soft, nontender, no hepatosplenomegaly, no masses and bowel sounds normal  MS: no gross musculoskeletal defects noted, no edema  SKIN: no suspicious lesions or rashes  NEURO: Normal strength and tone, mentation intact and speech normal  PSYCH:  mentation appears normal, affect normal/bright    Diagnostic Test Results:  Labs reviewed in Epic    ASSESSMENT / PLAN:   (Z00.00) Encounter for Medicare annual wellness exam  (primary encounter diagnosis)  Comment:   Plan:     (M54.50,  G89.29) Chronic right-sided low back pain, unspecified whether sciatica present  Comment: discussed needs to Try other meds as Muscle relaxants not recommended as wee get older due to risk of falls  Plan: tiZANidine (ZANAFLEX) 4 MG tablet, gabapentin         (NEURONTIN) 100 MG capsule        SEE EPIC care orders  The potential side effects of this medication have been discussed with the patient.  Call if any significant problems with these are experienced.      (E78.5) Hyperlipidemia LDL goal <130  Comment: stable   Plan:     (N40.1,  N13.8) Hypertrophy of prostate with urinary obstruction  Comment: has seen Urology  Plan:   COUNSELING:  Reviewed preventive health counseling, as reflected in patient instructions       Regular exercise       Healthy diet/nutrition       Vision screening       Hearing screening       Dental care       Bladder control       Colon cancer screening       Prostate cancer screening       The 10-year ASCVD risk score (Daron DAVIS Jr., et al., 2013) is: 22.8%    Values used to calculate the score:      Age: 71 years      Sex: Male      Is Non- : No      Diabetic: No      Tobacco smoker: No      Systolic Blood Pressure: 138 mmHg      Is BP treated: Yes      HDL Cholesterol: 63 mg/dL      Total Cholesterol: 137 mg/dL    Estimated body mass index is 25.31 kg/m  as calculated from the following:    Height as of this encounter: 1.829 m (6').    Weight as of this encounter: 84.6 kg (186 lb 9.6 oz).        He reports that he has never smoked. He has never used smokeless tobacco.      Appropriate preventive services were discussed with this patient, including applicable screening as appropriate for cardiovascular disease, diabetes,  osteopenia/osteoporosis, and glaucoma.  As appropriate for age/gender, discussed screening for colorectal cancer, prostate cancer, breast cancer, and cervical cancer. Checklist reviewing preventive services available has been given to the patient.    Reviewed patients plan of care and provided an AVS. The Intermediate Care Plan ( asthma action plan, low back pain action plan, and migraine action plan) for Steve meets the Care Plan requirement. This Care Plan has been established and reviewed with the Patient.    Counseling Resources:  ATP IV Guidelines  Pooled Cohorts Equation Calculator  Breast Cancer Risk Calculator  Breast Cancer: Medication to Reduce Risk  FRAX Risk Assessment  ICSI Preventive Guidelines  Dietary Guidelines for Americans, 2010  USDA's MyPlate  ASA Prophylaxis  Lung CA Screening    Biat Deluca MD  [facility]    Identified Health Risks:

## 2022-07-26 DIAGNOSIS — N13.8 HYPERTROPHY OF PROSTATE WITH URINARY OBSTRUCTION: ICD-10-CM

## 2022-07-26 DIAGNOSIS — N40.1 HYPERTROPHY OF PROSTATE WITH URINARY OBSTRUCTION: ICD-10-CM

## 2022-07-26 RX ORDER — TAMSULOSIN HYDROCHLORIDE 0.4 MG/1
0.4 CAPSULE ORAL AT BEDTIME
Qty: 90 CAPSULE | Refills: 3 | Status: SHIPPED | OUTPATIENT
Start: 2022-07-26 | End: 2023-08-09

## 2022-07-26 NOTE — TELEPHONE ENCOUNTER
Refilled Flomax. 90 with 3 refills. Last OV= 04/2022.    Jeanie ZACARIAS RN Urology 7/26/2022 2:33 PM

## 2022-08-03 ENCOUNTER — LAB (OUTPATIENT)
Dept: LAB | Facility: CLINIC | Age: 72
End: 2022-08-03
Payer: COMMERCIAL

## 2022-08-03 DIAGNOSIS — R97.20 ELEVATED PROSTATE SPECIFIC ANTIGEN (PSA): ICD-10-CM

## 2022-08-03 LAB — PSA SERPL-MCNC: 3.4 UG/L (ref 0–4)

## 2022-08-03 PROCEDURE — 36415 COLL VENOUS BLD VENIPUNCTURE: CPT

## 2022-08-03 PROCEDURE — 84153 ASSAY OF PSA TOTAL: CPT

## 2022-10-19 DIAGNOSIS — I10 HYPERTENSION GOAL BP (BLOOD PRESSURE) < 140/90: ICD-10-CM

## 2022-10-20 RX ORDER — ATENOLOL 25 MG/1
TABLET ORAL
Qty: 270 TABLET | Refills: 0 | Status: SHIPPED | OUTPATIENT
Start: 2022-10-20 | End: 2023-01-17

## 2022-10-28 ENCOUNTER — OFFICE VISIT (OUTPATIENT)
Dept: SURGERY | Facility: CLINIC | Age: 72
End: 2022-10-28
Payer: COMMERCIAL

## 2022-10-28 VITALS
BODY MASS INDEX: 25.23 KG/M2 | WEIGHT: 186 LBS | DIASTOLIC BLOOD PRESSURE: 82 MMHG | SYSTOLIC BLOOD PRESSURE: 137 MMHG | HEART RATE: 65 BPM

## 2022-10-28 DIAGNOSIS — R10.31 RIGHT GROIN PAIN: Primary | ICD-10-CM

## 2022-10-28 PROCEDURE — 99213 OFFICE O/P EST LOW 20 MIN: CPT | Performed by: SURGERY

## 2022-10-28 NOTE — PATIENT INSTRUCTIONS
Steve is a 72 year old male who is status post Right  Inguinal hernia repair with right  ProGrip 12 by 8 cm with no chills and no fever.      Patient's  Pain is  improving.  Appetite is good.  Ever since he had the surgery he has a tender spot inferior and towards his penis that is tender when he touches it and is better now than 7 months ago, but is still there. Does not notice with walking.    If he misses a day with a bowel movement then notices some discomfort with that while having a bowel movement.  But no problems with having a bowel movement.   Does not affect lifting or bending.     Wound(s)  Is/are   clean dry and intact with no evidence of infection.    Questions were answered.  The hernia feels intact.    Has been on gabapentin in the past for muscle relaxant.  And he only takes it when his back is bothering him.  And he noticed that it helped with the pain below the right inguinal hernia incision.   Does not usually take it.   CAN TRY LIDOCAINE PATCHES.   If getting worse then will get AN MRI     Plan: follow up as needed.

## 2022-10-28 NOTE — LETTER
10/28/2022         RE: Steve José  [address]  [place] [address]        Dear Colleague,    Thank you for referring your patient, Steve José, to the [facility]. Please see a copy of my visit note below.    Steve is a 72 year old male who is status post Right  Inguinal hernia repair with right  ProGrip 12 by 8 cm with no chills and no fever.      Patient's  Pain is  improving.  Appetite is good.  Ever since he had the surgery he has a tender spot inferior and towards his penis that is tender when he touches it and is better now than 7 months ago, but is still there. Does not notice with walking.    If he misses a day with a bowel movement then notices some discomfort with that while having a bowel movement.  But no problems with having a bowel movement.   Does not affect lifting or bending.     Wound(s)  Is/are   clean dry and intact with no evidence of infection.    Questions were answered.  The hernia feels intact.    Has been on gabapentin in the past for muscle relaxant.  And he only takes it when his back is bothering him.  And he noticed that it helped with the pain below the right inguinal hernia incision.   Does not usually take it.   CAN TRY LIDOCAINE PATCHES.   If getting worse then will get AN MRI     Plan: follow up as needed.      Time spent with the patient with greater that 50% of the time in discussion was 25 minutes.  Phu Melo MD             OPERATIVE REPORT     February 2, 2022  Preoperative diagnosis: right  Inguinal hernia.  Postoperative diagnosis:  same with very weak floor and had a previous incision just above this area  Procedure:  Right  Inguinal hernia repair with right  ProGrip 12 by 8 cm  Anesthesia:  General anesthesia with 0.25% marcaine placed thru out the procedure.   Blood loss: 10 cc  Specimen: none sent  Surgeon : Phu Melo M.D.  Indications:  Steve José is a 71 year old year old male with a right  inguinal hernia  that is causing  discomfort.  It was felt that it should be repaired.  Risks and complications were explained to the patient including bleeding, risk of anenesthesia, infection, recurrance of hernia, damage to bowel, bladder and  vessels to the testicle.  That mesh is a better long term repair, but the down side is that if it gets infected will need to be removed.  And since there was a previous surgery in th area will be hard to find the ilioinguinal nerve.   Questions were answered and postoperative instructions were given to patient and any one with the patient.       Procedure:  The patient was brought to the OR, placed in supine position  after given  general anesthesia, the right  groin was prepped in sterile manner. After a time out, an Incision was made in the usual fashion with a knife and then blunt and sharp and cautery was used to dissect down to the external oblique.  The external oblique was opened in the direction of its fibers at the external canal with cautery and then the rest was mainly blunt dissection.  The external oblique was pulled back and the internal oblique layer was weak also. The hernia was following the spermatic cord and The spermatic cord was carefully freed of surrounding attachments and placed in a Penrose drain.  All attachments with the cremasteric muscles were taken down with cautery.  There was an obvious hernia seen within the spermatic cord, so it was more of an  indirect hernia, with  the defect involving the floor, so I felt that once we had that completely freed up and placed back inside the abdomen, I did close the defect, catching the internal oblique muscle and fascia with a continuous running 0 Vicryl suture, and catching as far down on the lateral aspect of the external oblique fascia as possible to leave some room to close over the spermatic cord.   I also used some of the cremaesteic tissue and sutured it to the floor before pulling the internal oblique tissue  together.       After that was done, I was able to place a finger between my closure and the spermatic cord without much difficulty.  The  Right  12x8 cm ProGrip mesh was placed.  It was secured at the pubic tubercle with an 0-Vicryl suture at the marking on the mesh and then was laid on the floor, recreating the internal canal by bringing it around the spermatic cord.  It laid there very nicely.  I was able to place a finger between the spermatic cord and the mesh, but it looks like it is going to cover the area well.  After that was done and laid nicely, the area was irrigated with antibiotic irrigation with good clean return.  No evidence of any bleeding.  The spermatic cord was then placed on top of the mesh.  I never saw  the nerve, but made sure that where I sutured did not involve the nerve.     Then, the external oblique fascia  Or what I could pull over the spermatic cord was closed with a continuous running 0 Vicryl suture, taking great care not to involve the nerve and closing over the spermatic cord.  The Hailee's fascia was brought together with several interrupted 3-0 Vicryl sutures, and skin was closed with continuous running 4-0 Monocryl, covered with tincture of Benzoin, Steri-Strips and a Tegaderm, along with an abdominal binder.  The patient did receive antibiotics preoperatively.       Plan is to discharge him home today.  Lift no more than 20 pounds for 6  weeks.  I will see him back in approximately 2 weeks.           PHU MELO MD                Again, thank you for allowing me to participate in the care of your patient.        Sincerely,        Phu Melo MD

## 2022-10-28 NOTE — PROGRESS NOTES
Steve is a 72 year old male who is status post Right  Inguinal hernia repair with right  ProGrip 12 by 8 cm with no chills and no fever.      Patient's  Pain is  improving.  Appetite is good.  Ever since he had the surgery he has a tender spot inferior and towards his penis that is tender when he touches it and is better now than 7 months ago, but is still there. Does not notice with walking.    If he misses a day with a bowel movement then notices some discomfort with that while having a bowel movement.  But no problems with having a bowel movement.   Does not affect lifting or bending.     Wound(s)  Is/are   clean dry and intact with no evidence of infection.    Questions were answered.  The hernia feels intact.    Has been on gabapentin in the past for muscle relaxant.  And he only takes it when his back is bothering him.  And he noticed that it helped with the pain below the right inguinal hernia incision.   Does not usually take it.   CAN TRY LIDOCAINE PATCHES.   If getting worse then will get AN MRI     Plan: follow up as needed.      Time spent with the patient with greater that 50% of the time in discussion was 25 minutes.  Phu Melo MD             OPERATIVE REPORT     February 2, 2022  Preoperative diagnosis: right  Inguinal hernia.  Postoperative diagnosis:  same with very weak floor and had a previous incision just above this area  Procedure:  Right  Inguinal hernia repair with right  ProGrip 12 by 8 cm  Anesthesia:  General anesthesia with 0.25% marcaine placed thru out the procedure.   Blood loss: 10 cc  Specimen: none sent  Surgeon : Phu Melo M.D.  Indications:  Steve José is a 71 year old year old male with a right  inguinal hernia that is causing  discomfort.  It was felt that it should be repaired.  Risks and complications were explained to the patient including bleeding, risk of anenesthesia, infection, recurrance of hernia, damage to bowel, bladder and  vessels to the testicle.   That mesh is a better long term repair, but the down side is that if it gets infected will need to be removed.  And since there was a previous surgery in th area will be hard to find the ilioinguinal nerve.   Questions were answered and postoperative instructions were given to patient and any one with the patient.       Procedure:  The patient was brought to the OR, placed in supine position  after given  general anesthesia, the right  groin was prepped in sterile manner. After a time out, an Incision was made in the usual fashion with a knife and then blunt and sharp and cautery was used to dissect down to the external oblique.  The external oblique was opened in the direction of its fibers at the external canal with cautery and then the rest was mainly blunt dissection.  The external oblique was pulled back and the internal oblique layer was weak also. The hernia was following the spermatic cord and The spermatic cord was carefully freed of surrounding attachments and placed in a Penrose drain.  All attachments with the cremasteric muscles were taken down with cautery.  There was an obvious hernia seen within the spermatic cord, so it was more of an  indirect hernia, with  the defect involving the floor, so I felt that once we had that completely freed up and placed back inside the abdomen, I did close the defect, catching the internal oblique muscle and fascia with a continuous running 0 Vicryl suture, and catching as far down on the lateral aspect of the external oblique fascia as possible to leave some room to close over the spermatic cord.   I also used some of the cremaesteic tissue and sutured it to the floor before pulling the internal oblique tissue together.       After that was done, I was able to place a finger between my closure and the spermatic cord without much difficulty.  The  Right  12x8 cm ProGrip mesh was placed.  It was secured at the pubic tubercle with an 0-Vicryl suture at the marking on  the mesh and then was laid on the floor, recreating the internal canal by bringing it around the spermatic cord.  It laid there very nicely.  I was able to place a finger between the spermatic cord and the mesh, but it looks like it is going to cover the area well.  After that was done and laid nicely, the area was irrigated with antibiotic irrigation with good clean return.  No evidence of any bleeding.  The spermatic cord was then placed on top of the mesh.  I never saw  the nerve, but made sure that where I sutured did not involve the nerve.     Then, the external oblique fascia  Or what I could pull over the spermatic cord was closed with a continuous running 0 Vicryl suture, taking great care not to involve the nerve and closing over the spermatic cord.  The Hailee's fascia was brought together with several interrupted 3-0 Vicryl sutures, and skin was closed with continuous running 4-0 Monocryl, covered with tincture of Benzoin, Steri-Strips and a Tegaderm, along with an abdominal binder.  The patient did receive antibiotics preoperatively.       Plan is to discharge him home today.  Lift no more than 20 pounds for 6  weeks.  I will see him back in approximately 2 weeks.           ART MAC MD

## 2023-01-31 ENCOUNTER — TRANSFERRED RECORDS (OUTPATIENT)
Dept: HEALTH INFORMATION MANAGEMENT | Facility: CLINIC | Age: 73
End: 2023-01-31
Payer: COMMERCIAL

## 2023-02-03 ENCOUNTER — TRANSFERRED RECORDS (OUTPATIENT)
Dept: HEALTH INFORMATION MANAGEMENT | Facility: CLINIC | Age: 73
End: 2023-02-03

## 2023-04-10 ENCOUNTER — OFFICE VISIT (OUTPATIENT)
Dept: FAMILY MEDICINE | Facility: CLINIC | Age: 73
End: 2023-04-10
Payer: COMMERCIAL

## 2023-04-10 VITALS
OXYGEN SATURATION: 100 % | HEIGHT: 72 IN | BODY MASS INDEX: 25.27 KG/M2 | DIASTOLIC BLOOD PRESSURE: 85 MMHG | WEIGHT: 186.6 LBS | TEMPERATURE: 98 F | HEART RATE: 87 BPM | SYSTOLIC BLOOD PRESSURE: 163 MMHG

## 2023-04-10 DIAGNOSIS — M84.68XA PATHOLOGICAL FRACTURE IN OTHER DISEASE, OTHER SITE, INITIAL ENCOUNTER FOR FRACTURE: ICD-10-CM

## 2023-04-10 DIAGNOSIS — Z96.642 S/P TOTAL LEFT HIP ARTHROPLASTY: ICD-10-CM

## 2023-04-10 DIAGNOSIS — N13.8 HYPERTROPHY OF PROSTATE WITH URINARY OBSTRUCTION: ICD-10-CM

## 2023-04-10 DIAGNOSIS — S72.002D CLOSED FRACTURE OF LEFT HIP WITH ROUTINE HEALING, SUBSEQUENT ENCOUNTER: ICD-10-CM

## 2023-04-10 DIAGNOSIS — K21.9 GASTROESOPHAGEAL REFLUX DISEASE WITHOUT ESOPHAGITIS: ICD-10-CM

## 2023-04-10 DIAGNOSIS — M54.50 CHRONIC RIGHT-SIDED LOW BACK PAIN, UNSPECIFIED WHETHER SCIATICA PRESENT: ICD-10-CM

## 2023-04-10 DIAGNOSIS — N40.1 HYPERTROPHY OF PROSTATE WITH URINARY OBSTRUCTION: ICD-10-CM

## 2023-04-10 DIAGNOSIS — G89.29 CHRONIC RIGHT-SIDED LOW BACK PAIN, UNSPECIFIED WHETHER SCIATICA PRESENT: ICD-10-CM

## 2023-04-10 DIAGNOSIS — I10 HYPERTENSION GOAL BP (BLOOD PRESSURE) < 140/90: ICD-10-CM

## 2023-04-10 DIAGNOSIS — E78.5 HYPERLIPIDEMIA LDL GOAL <130: ICD-10-CM

## 2023-04-10 DIAGNOSIS — Z00.00 ENCOUNTER FOR MEDICARE ANNUAL WELLNESS EXAM: Primary | ICD-10-CM

## 2023-04-10 LAB
CALCIUM SERPL-MCNC: 9.4 MG/DL (ref 8.5–10.1)
CHOLEST SERPL-MCNC: 136 MG/DL
FASTING STATUS PATIENT QL REPORTED: YES
HDLC SERPL-MCNC: 66 MG/DL
LDLC SERPL CALC-MCNC: 53 MG/DL
NONHDLC SERPL-MCNC: 70 MG/DL
TRIGL SERPL-MCNC: 83 MG/DL

## 2023-04-10 PROCEDURE — 82310 ASSAY OF CALCIUM: CPT | Performed by: FAMILY MEDICINE

## 2023-04-10 PROCEDURE — G0439 PPPS, SUBSEQ VISIT: HCPCS | Performed by: FAMILY MEDICINE

## 2023-04-10 PROCEDURE — 80061 LIPID PANEL: CPT | Performed by: FAMILY MEDICINE

## 2023-04-10 PROCEDURE — 99214 OFFICE O/P EST MOD 30 MIN: CPT | Mod: 25 | Performed by: FAMILY MEDICINE

## 2023-04-10 PROCEDURE — 36415 COLL VENOUS BLD VENIPUNCTURE: CPT | Performed by: FAMILY MEDICINE

## 2023-04-10 RX ORDER — GABAPENTIN 100 MG/1
100 CAPSULE ORAL 2 TIMES DAILY
Qty: 180 CAPSULE | Refills: 3 | Status: SHIPPED | OUTPATIENT
Start: 2023-04-10 | End: 2024-04-08

## 2023-04-10 RX ORDER — SIMVASTATIN 10 MG
TABLET ORAL
Qty: 90 TABLET | Refills: 3 | Status: SHIPPED | OUTPATIENT
Start: 2023-04-10 | End: 2024-04-08

## 2023-04-10 RX ORDER — FINASTERIDE 5 MG/1
1 TABLET, FILM COATED ORAL DAILY
Qty: 90 TABLET | Refills: 3 | Status: SHIPPED | OUTPATIENT
Start: 2023-04-10 | End: 2023-09-12

## 2023-04-10 RX ORDER — POTASSIUM CHLORIDE 750 MG/1
TABLET, EXTENDED RELEASE ORAL
Qty: 90 TABLET | Refills: 3 | Status: SHIPPED | OUTPATIENT
Start: 2023-04-10 | End: 2024-04-08

## 2023-04-10 RX ORDER — LOSARTAN POTASSIUM 25 MG/1
25 TABLET ORAL DAILY
Qty: 30 TABLET | Refills: 0 | Status: SHIPPED | OUTPATIENT
Start: 2023-04-10 | End: 2023-05-02

## 2023-04-10 RX ORDER — HYDROCHLOROTHIAZIDE 25 MG/1
TABLET ORAL
Qty: 90 TABLET | Refills: 3 | Status: SHIPPED | OUTPATIENT
Start: 2023-04-10 | End: 2024-04-08

## 2023-04-10 ASSESSMENT — PAIN SCALES - GENERAL: PAINLEVEL: NO PAIN (0)

## 2023-04-10 NOTE — PROGRESS NOTES
{PROVIDER CHARTING PREFERENCE:480744}    Subjective   Steve is a 72 year old, presenting for the following health issues:  No chief complaint on file.         View : No data to display.              History of Present Illness       Reason for visit:  Wellness checkHe consumes 0 sweetened beverage(s) daily.He exercises with enough effort to increase his heart rate 60 or more minutes per day.  He exercises with enough effort to increase his heart rate 7 days per week.   He is taking medications regularly.       {SUPERLIST (Optional):814178}  {additonal problems for provider to add (Optional):258006}      Review of Systems   {ROS COMP (Optional):626908}      Objective    There were no vitals taken for this visit.  There is no height or weight on file to calculate BMI.  Physical Exam   {Exam List (Optional):749618}    {Diagnostic Test Results (Optional):669446}    {AMBULATORY ATTESTATION (Optional):084257}

## 2023-04-10 NOTE — PROGRESS NOTES
"SUBJECTIVE:   Steve is a 72 year old who presents for Preventive Visit.      4/10/2023     8:41 AM   Additional Questions   Roomed by Linsey   Patient has been advised of split billing requirements and indicates understanding: Yes  Are you in the first 12 months of your Medicare coverage?  No    Healthy Habits:     In general, how would you rate your overall health?  Very good    Frequency of exercise:  6-7 days/week    Duration of exercise:  30-45 minutes    Do you usually eat at least 4 servings of fruit and vegetables a day, include whole grains    & fiber and avoid regularly eating high fat or \"junk\" foods?  No    Taking medications regularly:  Yes    Barriers to taking medications:  None    Medication side effects:  None    PHQ-2 Total Score: 0  History of Present Illness       Reason for visit:  Wellness checkHe consumes 0 sweetened beverage(s) daily.He exercises with enough effort to increase his heart rate 60 or more minutes per day.  He exercises with enough effort to increase his heart rate 7 days per week.   He is not taking prescribed medications regularly due to None.      Have you ever done Advance Care Planning? (For example, a Health Directive, POLST, or a discussion with a medical provider or your loved ones about your wishes): No, advance care planning information given to patient to review.  Advanced care planning was discussed at today's visit.       Fall risk  Fallen 2 or more times in the past year?: No  Any fall with injury in the past year?: Yes    Cognitive Screening   1) Repeat 3 items (Leader, Season, Table)    2) Clock draw: NORMAL  3) 3 item recall: Recalls 3 objects  Results: 3 items recalled: COGNITIVE IMPAIRMENT LESS LIKELY    Mini-CogTM Copyright S Sugey. Licensed by the author for use in [facility]; reprinted with permission (anirudh@.[facility]). All rights reserved.          Reviewed and updated as needed this visit by clinical staff   Tobacco  Allergies  Meds  Problems  " Med Hx  Surg Hx  Fam Hx          Reviewed and updated as needed this visit by Provider   Tobacco  Allergies  Meds  Problems  Med Hx  Surg Hx  Fam Hx         Social History     Tobacco Use     Smoking status: Never     Smokeless tobacco: Never   Vaping Use     Vaping status: Not on file   Substance Use Topics     Alcohol use: Not Currently     Comment: rare             4/24/2022     7:42 AM   Alcohol Use   Prescreen: >3 drinks/day or >7 drinks/week? No     Do you have a current opioid prescription? No  Do you use any other controlled substances or medications that are not prescribed by a provider? None      Hospital Follow-up Visit:    Hospital/Nursing Home/IP Rehab Facility: [facility]  Date of Admission: 3-8-23  Date of Discharge: 3-10-23  Reason(s) for Admission: left Hip arthroplasty after Fracture  Hypertension    Was your hospitalization related to COVID-19? No   Problems taking medications regularly:  None  Medication changes since discharge: None  Problems adhering to non-medication therapy:  None    Summary of hospitalization:  CareEverywhere information obtained and reviewed  Diagnostic Tests/Treatments reviewed.  Follow up needed: yes-recheck BP and sees Ortho  Other Healthcare Providers Involved in Patient s Care:         None  Update since discharge: improved.   Plan of care communicated with patient     Hyperlipidemia Follow-Up      Are you regularly taking any medication or supplement to lower your cholesterol?   Yes- statin    Are you having muscle aches or other side effects that you think could be caused by your cholesterol lowering medication?  No    Hypertension Follow-up      Do you check your blood pressure regularly outside of the clinic? No     Are you following a low salt diet? Yes    Are your blood pressures ever more than 140 on the top number (systolic) OR more   than 90 on the bottom number (diastolic), for example 140/90?   Pt is only taking Norvasc and HCTZ      How many servings of  fruits and vegetables do you eat daily?  2-3    On average, how many sweetened beverages do you drink each day (Examples: soda, juice, sweet tea, etc.  Do NOT count diet or artificially sweetened beverages)?   0    How many days per week do you exercise enough to make your heart beat faster? 7    How many minutes a day do you exercise enough to make your heart beat faster? 60 or more    How many days per week do you miss taking your medication? 0    Current providers sharing in care for this patient include:   Patient Care Team:  Bita Deluca MD as PCP - General  Bita Deluca MD as Assigned PCP  Johanne Dunn PA-C as Referring Physician (Family Medicine)  Talya Barlow MD as MD (Dermatology)  Phu Melo MD as Assigned Surgical Provider    The following health maintenance items are reviewed in Epic and correct as of today:  Health Maintenance   Topic Date Due     AORTIC ANEURYSM SCREENING (SYSTEM ASSIGNED)  Never done     BMP  01/24/2023     LIPID  01/24/2023     MEDICARE ANNUAL WELLNESS VISIT  04/27/2023     ANNUAL REVIEW OF HM ORDERS  04/10/2024     FALL RISK ASSESSMENT  04/10/2024     COLORECTAL CANCER SCREENING  01/31/2026     ADVANCE CARE PLANNING  04/27/2027     DTAP/TDAP/TD IMMUNIZATION (3 - Td or Tdap) 03/20/2028     HEPATITIS C SCREENING  Completed     PHQ-2 (once per calendar year)  Completed     INFLUENZA VACCINE  Completed     Pneumococcal Vaccine: 65+ Years  Completed     ZOSTER IMMUNIZATION  Completed     COVID-19 Vaccine  Completed     IPV IMMUNIZATION  Aged Out     MENINGITIS IMMUNIZATION  Aged Out     Lab work is in process  Labs reviewed in EPIC  BP Readings from Last 3 Encounters:   04/10/23 (!) 163/85   10/28/22 137/82   04/27/22 138/78    Wt Readings from Last 3 Encounters:   04/10/23 84.6 kg (186 lb 9.6 oz)   10/28/22 84.4 kg (186 lb)   04/27/22 84.6 kg (186 lb 9.6 oz)                  Patient Active Problem List   Diagnosis     GERD (gastroesophageal reflux disease)      Hyperlipidemia LDL goal <130     Hypertension goal BP (blood pressure) < 140/90     Advanced directives, counseling/discussion     Hypertrophy of prostate with urinary obstruction     Chronic right-sided low back pain, unspecified whether sciatica present     Closed fracture of left hip with routine healing, subsequent encounter     S/P total left hip arthroplasty     Past Surgical History:   Procedure Laterality Date     ABDOMEN SURGERY  1978    Right ureter reimplantation     APPENDECTOMY       BIOPSY  five times    Prostate     COLONOSCOPY  1/6/2018     HERNIORRHAPHY INGUINAL Right 2/2/2022    Procedure: OPEN RIGHT INGUINAL HERNIORRHAPHY WITH MESH;  Surgeon: Phu Melo MD;  Location: [facility] OR     HRW CORNEAL TRANSPLANT, CRNA       LASIK  2005     TURP  1998    x 2     [facility] [place] GENITAL SURG PROC,MALE UNLISTED  2001    left hydrocele     [facility] [place] GENITAL SURG PROC,MALE UNLISTED  1977    right ureteral reimplantation       Social History     Tobacco Use     Smoking status: Never     Smokeless tobacco: Never   Vaping Use     Vaping status: Not on file   Substance Use Topics     Alcohol use: Not Currently     Comment: rare     Family History   Problem Relation Age of Onset     C.A.D. Father 64        d. MI     Hypertension Father      Hypertension Mother      Cancer Mother         skin     Cerebrovascular Disease Mother      Diabetes No family hx of      Cancer - colorectal No family hx of          Current Outpatient Medications   Medication Sig Dispense Refill     finasteride (PROSCAR) 5 MG tablet Take 1 tablet (5 mg) by mouth daily 90 tablet 3     gabapentin (NEURONTIN) 100 MG capsule Take 1 capsule (100 mg) by mouth 2 times daily 180 capsule 3     hydrochlorothiazide (HYDRODIURIL) 25 MG tablet TAKE 1 TABLET BY MOUTH ONCE DAILY . APPOINTMENT REQUIRED FOR FUTURE REFILLS 90 tablet 3     losartan (COZAAR) 25 MG tablet Take 1 tablet (25 mg) by mouth daily 30 tablet 0     Multiple Vitamins-Minerals (MULTI FOR HIM PO)  Take  by mouth daily.       omeprazole (PRILOSEC) 20 MG DR capsule Take 20 mg by mouth daily       potassium chloride ER (K-TAB/KLOR-CON) 10 MEQ CR tablet TAKE 1 TABLET BY MOUTH ONCE DAILY . 90 tablet 3     simvastatin (ZOCOR) 10 MG tablet TAKE 1 TABLET BY MOUTH AT BEDTIME . 90 tablet 3     tamsulosin (FLOMAX) 0.4 MG capsule Take 1 capsule (0.4 mg) by mouth At Bedtime 90 capsule 3     amLODIPine (NORVASC) 2.5 MG tablet Take 1 tablet by mouth once daily (Patient not taking: Reported on 4/10/2023) 90 tablet 0     atenolol (TENORMIN) 25 MG tablet TAKE 2 TABLETS BY MOUTH IN THE MORNING AND 1 IN THE EVENING (Patient not taking: Reported on 4/10/2023) 270 tablet 0     famotidine (PEPCID) 10 MG tablet Take 10 mg by mouth 2 times daily (Patient not taking: Reported on 4/10/2023)       tiZANidine (ZANAFLEX) 4 MG tablet Take 1 tablet by mouth three times daily as needed for muscle spasm 30 tablet 0     No Known Allergies  Recent Labs   Lab Test 01/24/22  1154 02/08/21  0941 04/23/20  1044 04/08/19  0927 10/22/18  0912 10/08/15  0927 04/17/15  0822   A1C  --   --   --   --   --   --  5.8   LDL 62 70 67 55 67   < >  --    HDL 63 64 70 66 60   < >  --    TRIG 58 70 56 62 60   < >  --    ALT  --   --  28 23 26   < >  --    CR 0.78 0.86 0.86 0.90 0.86   < > 0.80   GFRESTIMATED >90 86 87 86 88   < > >90  Non  GFR Calc     GFRESTBLACK  --  >90 >90 >90 >90   < > >90  African American GFR Calc     POTASSIUM 4.2 4.1 4.4 4.0 3.9   < > 3.8    < > = values in this interval not displayed.              Review of Systems  CONSTITUTIONAL: NEGATIVE for fever, chills, change in weight  INTEGUMENTARY/SKIN: NEGATIVE for worrisome rashes, moles or lesions  EYES: NEGATIVE for vision changes or irritation  ENT/MOUTH: NEGATIVE for ear, mouth and throat problems  RESP: NEGATIVE for significant cough or SOB  BREAST: NEGATIVE for masses, tenderness or discharge  CV: NEGATIVE for chest pain, palpitations or peripheral edema  GI: NEGATIVE  for nausea, abdominal pain, heartburn, or change in bowel habits  : NEGATIVE for frequency, dysuria, or hematuria  MUSCULOSKELETAL: NEGATIVE for significant arthralgias or myalgia  NEURO: NEGATIVE for weakness, dizziness or paresthesias  ENDOCRINE: NEGATIVE for temperature intolerance, skin/hair changes  HEME: NEGATIVE for bleeding problems  PSYCHIATRIC: NEGATIVE for changes in mood or affect    OBJECTIVE:   BP (!) 163/85   Pulse 87   Temp 98  F (36.7  C) (Temporal)   Ht 1.829 m (6')   Wt 84.6 kg (186 lb 9.6 oz)   SpO2 100%   BMI 25.31 kg/m   Estimated body mass index is 25.31 kg/m  as calculated from the following:    Height as of this encounter: 1.829 m (6').    Weight as of this encounter: 84.6 kg (186 lb 9.6 oz).  Physical Exam  GENERAL: healthy, alert and no distress  EYES: Eyes grossly normal to inspection, PERRL and conjunctivae and sclerae normal  HENT: ear canals and TM's normal, nose and mouth without ulcers or lesions  NECK: no adenopathy, no asymmetry, masses, or scars and thyroid normal to palpation  RESP: lungs clear to auscultation - no rales, rhonchi or wheezes  CV: regular rate and rhythm, normal S1 S2, no S3 or S4, no murmur, click or rub, no peripheral edema and peripheral pulses strong  ABDOMEN: soft, nontender, no hepatosplenomegaly, no masses and bowel sounds normal  MS: no gross musculoskeletal defects noted, no edema  SKIN: no suspicious lesions or rashes  NEURO: Normal strength and tone, mentation intact and speech normal  PSYCH: mentation appears normal, affect normal/bright    Diagnostic Test Results:  Labs reviewed in Epic  Pending     ASSESSMENT / PLAN:   (Z00.00) Encounter for Medicare annual wellness exam  (primary encounter diagnosis)  Comment:   Plan: DX Hip/Pelvis/Spine        Advised Dexa due to Hip Fracture    (E78.5) Hyperlipidemia LDL goal <130  Comment: pending   Plan: Lipid panel reflex to direct LDL Non-fasting,         simvastatin (ZOCOR) 10 MG tablet, losartan         (I10) Hypertension goal BP (blood pressure) < 140/90  Comment: high  He has stopped atenolol as BP was Low when he was in Hospital  He has been taking amlodipine and hydrochlorothiazide  Advised add cozaar  The potential side effects of this medication have been discussed with the patient.  Call if any significant problems with these are experienced.  Follow up with me 3 weeks BP and Potassium check  Plan: potassium chloride ER (K-TAB/KLOR-CON) 10 MEQ         CR tablet, hydrochlorothiazide (HYDRODIURIL) 25        MG tablet, Calcium, CANCELED: BASIC METABOLIC         PANEL            (M54.50,  G89.29) Chronic right-sided low back pain, unspecified whether sciatica present  Comment: refilled  Plan: gabapentin (NEURONTIN) 100 MG capsule            (N40.1,  N13.8) Hypertrophy of prostate with urinary obstruction  Comment: refilled  Plan: finasteride (PROSCAR) 5 MG tablet        Also sees Dr Sotelo    (K21.9) Gastroesophageal reflux disease without esophagitis  Comment: doing well      (S72.002D) Closed fracture of left hip with routine healing, subsequent encounter  Comment: March 2023-s/p left Hip arthroplasty  Doing well  Has a follow up scheduled with Ortho  Walking well    Plan: DX Hip/Pelvis/Spine        Advised     (Z96.642) S/P total left hip arthroplasty  Comment: stable       (M84.68XA) Pathological fracture in other disease, other site, initial encounter for fracture  Comment:   Plan: DX Hip/Pelvis/Spine          Total Time 40 minutes-  Time spent reviewing chart, addressing her medical Problems as above, ordering labs, refilling meds and discussing her medical Problems, , documenting-Labs will be reviewed when back and will make further recommendations based on her labs        COUNSELING:  Reviewed preventive health counseling, as reflected in patient instructions       Regular exercise       Healthy diet/nutrition       Vision screening       Hearing screening       Dental care       Bladder control        Fall risk prevention       Prostate cancer screening      BMI:   Estimated body mass index is 25.31 kg/m  as calculated from the following:    Height as of this encounter: 1.829 m (6').    Weight as of this encounter: 84.6 kg (186 lb 9.6 oz).         He reports that he has never smoked. He has never used smokeless tobacco.      Appropriate preventive services were discussed with this patient, including applicable screening as appropriate for cardiovascular disease, diabetes, osteopenia/osteoporosis, and glaucoma.  As appropriate for age/gender, discussed screening for colorectal cancer, prostate cancer, breast cancer, and cervical cancer. Checklist reviewing preventive services available has been given to the patient.    Reviewed patients plan of care and provided an AVS. The Complex Care Plan (for patients with higher acuity and needing more deliberate coordination of services) for Steve meets the Care Plan requirement. This Care Plan has been established and reviewed with the Patient.      Bita Deluca MD  [facility]    Identified Health Risks:    I have reviewed Opioid Use Disorder and Substance Use Disorder risk factors and made any needed referrals.

## 2023-04-21 ENCOUNTER — ANCILLARY ORDERS (OUTPATIENT)
Dept: FAMILY MEDICINE | Facility: CLINIC | Age: 73
End: 2023-04-21

## 2023-04-21 ENCOUNTER — ANCILLARY PROCEDURE (OUTPATIENT)
Dept: BONE DENSITY | Facility: CLINIC | Age: 73
End: 2023-04-21
Attending: FAMILY MEDICINE
Payer: COMMERCIAL

## 2023-04-21 DIAGNOSIS — S72.002D CLOSED FRACTURE OF HIP, LEFT, WITH ROUTINE HEALING, SUBSEQUENT ENCOUNTER: ICD-10-CM

## 2023-04-21 DIAGNOSIS — Z00.00 ENCOUNTER FOR MEDICARE ANNUAL WELLNESS EXAM: ICD-10-CM

## 2023-04-21 DIAGNOSIS — M84.68XA PATHOLOGICAL FRACTURE IN OTHER DISEASE, OTHER SITE, INITIAL ENCOUNTER FOR FRACTURE: ICD-10-CM

## 2023-04-21 DIAGNOSIS — M84.68XA: ICD-10-CM

## 2023-04-21 DIAGNOSIS — S72.002D CLOSED FRACTURE OF LEFT HIP WITH ROUTINE HEALING, SUBSEQUENT ENCOUNTER: ICD-10-CM

## 2023-04-21 PROCEDURE — 77080 DXA BONE DENSITY AXIAL: CPT | Mod: XU | Performed by: INTERNAL MEDICINE

## 2023-04-21 PROCEDURE — 77081 DXA BONE DENSITY APPENDICULR: CPT | Performed by: INTERNAL MEDICINE

## 2023-05-02 ENCOUNTER — OFFICE VISIT (OUTPATIENT)
Dept: FAMILY MEDICINE | Facility: CLINIC | Age: 73
End: 2023-05-02
Payer: COMMERCIAL

## 2023-05-02 VITALS
HEART RATE: 81 BPM | TEMPERATURE: 97.7 F | WEIGHT: 188.6 LBS | BODY MASS INDEX: 25.58 KG/M2 | SYSTOLIC BLOOD PRESSURE: 136 MMHG | DIASTOLIC BLOOD PRESSURE: 81 MMHG | OXYGEN SATURATION: 99 %

## 2023-05-02 DIAGNOSIS — M80.00XD AGE-RELATED OSTEOPOROSIS WITH CURRENT PATHOLOGICAL FRACTURE WITH ROUTINE HEALING, SUBSEQUENT ENCOUNTER: Primary | ICD-10-CM

## 2023-05-02 DIAGNOSIS — I10 HYPERTENSION GOAL BP (BLOOD PRESSURE) < 140/90: ICD-10-CM

## 2023-05-02 DIAGNOSIS — N40.1 BENIGN PROSTATIC HYPERPLASIA WITH LOWER URINARY TRACT SYMPTOMS, SYMPTOM DETAILS UNSPECIFIED: ICD-10-CM

## 2023-05-02 LAB
ALP SERPL-CCNC: 76 U/L (ref 40–150)
ANION GAP SERPL CALCULATED.3IONS-SCNC: 7 MMOL/L (ref 3–14)
BUN SERPL-MCNC: 25 MG/DL (ref 7–30)
CALCIUM SERPL-MCNC: 9.1 MG/DL (ref 8.5–10.1)
CHLORIDE BLD-SCNC: 103 MMOL/L (ref 94–109)
CO2 SERPL-SCNC: 30 MMOL/L (ref 20–32)
CREAT SERPL-MCNC: 0.76 MG/DL (ref 0.66–1.25)
GFR SERPL CREATININE-BSD FRML MDRD: >90 ML/MIN/1.73M2
GLUCOSE BLD-MCNC: 115 MG/DL (ref 70–99)
PHOSPHATE SERPL-MCNC: 3.2 MG/DL (ref 2.5–4.5)
POTASSIUM BLD-SCNC: 4 MMOL/L (ref 3.4–5.3)
PTH-INTACT SERPL-MCNC: 36 PG/ML (ref 15–65)
SODIUM SERPL-SCNC: 140 MMOL/L (ref 133–144)

## 2023-05-02 PROCEDURE — 84100 ASSAY OF PHOSPHORUS: CPT | Performed by: FAMILY MEDICINE

## 2023-05-02 PROCEDURE — 80048 BASIC METABOLIC PNL TOTAL CA: CPT | Performed by: FAMILY MEDICINE

## 2023-05-02 PROCEDURE — 99214 OFFICE O/P EST MOD 30 MIN: CPT | Performed by: FAMILY MEDICINE

## 2023-05-02 PROCEDURE — 83970 ASSAY OF PARATHORMONE: CPT | Performed by: FAMILY MEDICINE

## 2023-05-02 PROCEDURE — 36415 COLL VENOUS BLD VENIPUNCTURE: CPT | Performed by: FAMILY MEDICINE

## 2023-05-02 PROCEDURE — 84075 ASSAY ALKALINE PHOSPHATASE: CPT | Performed by: FAMILY MEDICINE

## 2023-05-02 PROCEDURE — 82306 VITAMIN D 25 HYDROXY: CPT | Performed by: FAMILY MEDICINE

## 2023-05-02 RX ORDER — LOSARTAN POTASSIUM 25 MG/1
25 TABLET ORAL DAILY
Qty: 90 TABLET | Refills: 3 | Status: SHIPPED | OUTPATIENT
Start: 2023-05-02 | End: 2024-04-08

## 2023-05-02 RX ORDER — AMLODIPINE BESYLATE 2.5 MG/1
2.5 TABLET ORAL DAILY
Qty: 90 TABLET | Refills: 3 | Status: SHIPPED | OUTPATIENT
Start: 2023-05-02 | End: 2024-04-08

## 2023-05-02 RX ORDER — ALENDRONATE SODIUM 70 MG/1
70 TABLET ORAL
Qty: 4 TABLET | Refills: 11 | Status: SHIPPED | OUTPATIENT
Start: 2023-05-02 | End: 2024-02-14

## 2023-05-02 ASSESSMENT — PAIN SCALES - GENERAL: PAINLEVEL: NO PAIN (0)

## 2023-05-02 NOTE — PROGRESS NOTES
Assessment & Plan     Age-related osteoporosis with current pathological fracture with routine healing, subsequent encounter  Discussed meds  Labs pen d  SEE EPIC care orders  The potential side effects of this medication have been discussed with the patient.  Call if any significant problems with these are experienced.  Follow up if any Indigestion, muscle aches etc  - Parathyroid Hormone Intact; Future  - Vitamin D Deficiency; Future  - Phosphorus; Future  - Alkaline phosphatase; Future  - alendronate (FOSAMAX) 70 MG tablet; Take 1 tablet (70 mg) by mouth every 7 days  - Parathyroid Hormone Intact  - Vitamin D Deficiency  - Phosphorus  - Alkaline phosphatase    Hypertension goal BP (blood pressure) < 140/90  Stable   - losartan (COZAAR) 25 MG tablet; Take 1 tablet (25 mg) by mouth daily  - amLODIPine (NORVASC) 2.5 MG tablet; Take 1 tablet (2.5 mg) by mouth daily  - Basic metabolic panel  (Ca, Cl, CO2, Creat, Gluc, K, Na, BUN); Future  - Basic metabolic panel  (Ca, Cl, CO2, Creat, Gluc, K, Na, BUN)  Refills done  Benign prostatic hyperplasia with lower urinary tract symptoms, symptom details unspecified  See Dr Sotelo    Follow up 6 months  Bita Deluca MD  [facility] [place] Wilson is a 72 year old, presenting for the following health issues:  Hypertension and Results (Dexa scan)        5/2/2023    10:23 AM   Additional Questions   Roomed by Linsey     History of Present Illness       Hypertension: He presents for follow up of hypertension.  He does not check blood pressure  regularly outside of the clinic. Outside blood pressures have been over 140/90. He does not follow a low salt diet.     He eats 0-1 servings of fruits and vegetables daily.He consumes 0 sweetened beverage(s) daily.He exercises with enough effort to increase his heart rate 60 or more minutes per day.  He exercises with enough effort to increase his heart rate 7 days per week.   He is taking medications regularly.   pt  also has Osteoporosis  DEXA reviewed   Pt also had a Hip Fracture and doing well    Review of Systems   Rest of the ROS is Negative except see above and Problem list [stable]        Objective    /81   Pulse 81   Temp 97.7  F (36.5  C) (Temporal)   Wt 85.5 kg (188 lb 9.6 oz)   SpO2 99%   BMI 25.58 kg/m    Body mass index is 25.58 kg/m .  Physical Exam   GENERAL: healthy, alert and no distress  NECK: no adenopathy, no asymmetry, masses, or scars and thyroid normal to palpation  RESP: lungs clear to auscultation - no rales, rhonchi or wheezes  CV: regular rate and rhythm, normal S1 S2, no S3 or S4, no murmur, click or rub, no peripheral edema and peripheral pulses strong  ABDOMEN: soft, nontender, no hepatosplenomegaly, no masses and bowel sounds normal  MS: no gross musculoskeletal defects noted, no edema    Pending

## 2023-05-03 LAB — DEPRECATED CALCIDIOL+CALCIFEROL SERPL-MC: 55 UG/L (ref 20–75)

## 2023-08-09 DIAGNOSIS — N13.8 HYPERTROPHY OF PROSTATE WITH URINARY OBSTRUCTION: ICD-10-CM

## 2023-08-09 DIAGNOSIS — N40.1 HYPERTROPHY OF PROSTATE WITH URINARY OBSTRUCTION: ICD-10-CM

## 2023-08-09 RX ORDER — TAMSULOSIN HYDROCHLORIDE 0.4 MG/1
0.4 CAPSULE ORAL AT BEDTIME
Qty: 90 CAPSULE | Refills: 0 | Status: SHIPPED | OUTPATIENT
Start: 2023-08-09 | End: 2023-09-12

## 2023-08-09 NOTE — LETTER
[address]  [place] [address]  [phone]      August 9, 2023    Steve RELL José                                                                                                                     [address] [facility] [address]            Dear Steve,            We, at [facility] want to help you receive better care.  To improve the process of getting refills and following up with your provider we are asking that you schedule an appointment with Dr. Sotelo before your next refill runs out.  We will yuliana you a one time 90 day estela refill. We will not be able to refill your medication without an appointment after 11/7/23.  You can call the clinic at [phone] to schedule. This can be done as a virtual visit.    Sincerely,    [facility]       Sincerely,         Semaj Sotelo MD

## 2023-08-09 NOTE — TELEPHONE ENCOUNTER
Signed Prescriptions:                        Disp   Refills    tamsulosin (FLOMAX) 0.4 MG capsule         90 cap*0        Sig: Take 1 capsule (0.4 mg) by mouth At Bedtime  Authorizing Provider: BENJY NARVAEZ  Ordering User: NAOMIE LEMUS    ONE TIME SHE Refill. Last OV= 04/2022. Letter sent to patient to schedule an appointment for future fills.    Jeanie ZACARIAS RN Urology 8/9/2023 12:12 PM

## 2023-09-12 ENCOUNTER — OFFICE VISIT (OUTPATIENT)
Dept: UROLOGY | Facility: CLINIC | Age: 73
End: 2023-09-12
Payer: COMMERCIAL

## 2023-09-12 VITALS
OXYGEN SATURATION: 100 % | WEIGHT: 188 LBS | DIASTOLIC BLOOD PRESSURE: 81 MMHG | HEART RATE: 69 BPM | BODY MASS INDEX: 25.5 KG/M2 | RESPIRATION RATE: 16 BRPM | SYSTOLIC BLOOD PRESSURE: 139 MMHG

## 2023-09-12 DIAGNOSIS — N40.1 BENIGN PROSTATIC HYPERPLASIA WITH LOWER URINARY TRACT SYMPTOMS, SYMPTOM DETAILS UNSPECIFIED: Primary | ICD-10-CM

## 2023-09-12 DIAGNOSIS — R97.20 ELEVATED PROSTATE SPECIFIC ANTIGEN (PSA): ICD-10-CM

## 2023-09-12 PROCEDURE — 99213 OFFICE O/P EST LOW 20 MIN: CPT | Mod: 25 | Performed by: UROLOGY

## 2023-09-12 PROCEDURE — 51798 US URINE CAPACITY MEASURE: CPT | Performed by: UROLOGY

## 2023-09-12 RX ORDER — FINASTERIDE 5 MG/1
1 TABLET, FILM COATED ORAL DAILY
Qty: 90 TABLET | Refills: 3 | Status: SHIPPED | OUTPATIENT
Start: 2023-09-12 | End: 2024-09-24

## 2023-09-12 RX ORDER — TAMSULOSIN HYDROCHLORIDE 0.4 MG/1
0.4 CAPSULE ORAL AT BEDTIME
Qty: 90 CAPSULE | Refills: 3 | Status: SHIPPED | OUTPATIENT
Start: 2023-09-12 | End: 2024-09-24

## 2023-09-12 ASSESSMENT — PAIN SCALES - GENERAL: PAINLEVEL: NO PAIN (0)

## 2023-09-12 NOTE — PROGRESS NOTES
Bladder Scan performed. 12ml maximum residual urine detected after 3 scans. MD informed.    EFFIE De Guzman RN 9/12/2023 10:04 AM

## 2023-09-12 NOTE — PROGRESS NOTES
Chief Complaint   Patient presents with    Follow Up     Yearly Check up on Elevated PSA and Medication Renewal       Steve José is a 73 year old male who presents today for follow up of   Chief Complaint   Patient presents with    Follow Up     Yearly Check up on Elevated PSA and Medication Renewal    Follow up for elevated psa/benign prostatic hyperplasia.  He is s/p biopsy of prostate many years ago.  Psa is stable <10.  He is on flomax/proscar with minimal changes in LUTS.   He has h/o urinary retention in the past.    Current Outpatient Medications   Medication Sig Dispense Refill    alendronate (FOSAMAX) 70 MG tablet Take 1 tablet (70 mg) by mouth every 7 days 4 tablet 11    amLODIPine (NORVASC) 2.5 MG tablet Take 1 tablet (2.5 mg) by mouth daily 90 tablet 3    famotidine (PEPCID) 10 MG tablet Take 10 mg by mouth 2 times daily      finasteride (PROSCAR) 5 MG tablet Take 1 tablet (5 mg) by mouth daily 90 tablet 3    gabapentin (NEURONTIN) 100 MG capsule Take 1 capsule (100 mg) by mouth 2 times daily 180 capsule 3    hydrochlorothiazide (HYDRODIURIL) 25 MG tablet TAKE 1 TABLET BY MOUTH ONCE DAILY . APPOINTMENT REQUIRED FOR FUTURE REFILLS 90 tablet 3    losartan (COZAAR) 25 MG tablet Take 1 tablet (25 mg) by mouth daily 90 tablet 3    Multiple Vitamins-Minerals (MULTI FOR HIM PO) Take  by mouth daily.      omeprazole (PRILOSEC) 20 MG DR capsule Take 20 mg by mouth daily      potassium chloride ER (K-TAB/KLOR-CON) 10 MEQ CR tablet TAKE 1 TABLET BY MOUTH ONCE DAILY . 90 tablet 3    simvastatin (ZOCOR) 10 MG tablet TAKE 1 TABLET BY MOUTH AT BEDTIME . 90 tablet 3    tamsulosin (FLOMAX) 0.4 MG capsule Take 1 capsule (0.4 mg) by mouth At Bedtime 90 capsule 0    atenolol (TENORMIN) 25 MG tablet TAKE 2 TABLETS BY MOUTH IN THE MORNING AND 1 IN THE EVENING (Patient not taking: Reported on 4/10/2023) 270 tablet 0    tiZANidine (ZANAFLEX) 4 MG tablet Take 1 tablet by mouth three times daily as needed for muscle spasm 30  tablet 0     No Known Allergies   Past Medical History:   Diagnosis Date    BPH (benign prostatic hypertrophy)     Chronic low back pain 2001    Colon polyp 2000    Hyperlipidemia     Hypertension     Prostatitis     recurrent     Past Surgical History:   Procedure Laterality Date    ABDOMEN SURGERY  1978    Right ureter reimplantation    APPENDECTOMY      BIOPSY  five times    Prostate    COLONOSCOPY  1/6/2018    HERNIORRHAPHY INGUINAL Right 2/2/2022    Procedure: OPEN RIGHT INGUINAL HERNIORRHAPHY WITH MESH;  Surgeon: Phu Melo MD;  Location: [facility] OR    [facility] CORNEAL TRANSPLANT, CRNA      LASIK  2005    TURP  1998    x 2    ZZC GENITAL SURG PROC,MALE UNLISTED  2001    left hydrocele    ZZC GENITAL SURG PROC,MALE UNLISTED  1977    right ureteral reimplantation     Family History   Problem Relation Age of Onset    C.A.D. Father 64        d. MI    Hypertension Father     Hypertension Mother     Cancer Mother         skin    Cerebrovascular Disease Mother     Diabetes No family hx of     Cancer - colorectal No family hx of      Social History     Socioeconomic History    Marital status:      Spouse name: Aym     Number of children: 0    Years of education: 15    Highest education level: None   Occupational History    Occupation: printing company sales     Employer: RETIRED   Tobacco Use    Smoking status: Never    Smokeless tobacco: Never   Substance and Sexual Activity    Alcohol use: Not Currently     Comment: rare    Drug use: No    Sexual activity: Yes     Partners: Female     Birth control/protection: None   Other Topics Concern    Parent/sibling w/ CABG, MI or angioplasty before 65F 55M? No     Service No    Blood Transfusions No     Comment: unsure    Caffeine Concern No    Occupational Exposure No    Hobby Hazards No    Sleep Concern No    Stress Concern No    Weight Concern No    Special Diet No     Comment: watches intake of salt and sugar    Back Care No    Exercise Yes      Comment: everyday goes for mile and a half to two mile walk    Bike Helmet No    Seat Belt Yes    Self-Exams Yes       REVIEW OF SYSTEMS  =================  C: NEGATIVE for fever, chills, change in weight  I: NEGATIVE for worrisome rashes, moles or lesions  E/M: NEGATIVE for ear, mouth and throat problems  R: NEGATIVE for significant cough or SHORTNESS OF BREATH  CV:  NEGATIVE for chest pain, palpitations or peripheral edema  GI: NEGATIVE for nausea, abdominal pain, heartburn, or change in bowel habits  NEURO: NEGATIVE numbness/weakness  : see HPI  PSYCH: NEGATIVE depression/anxiety  LYmph: no new enlarged lymph nodes  Ortho: no new trauma/movements    Physical Exam:  Blood pressure 139/81, pulse 69, resp. rate 16, weight 85.3 kg (188 lb), SpO2 100 %.    GENERAL: healthy, alert and no distress  EYES: Eyes grossly normal to inspection, conjunctivae and sclerae normal  RESP: no audible wheeze, cough, or visible cyanosis.  No visible retractions or increased work of breathing.  Able to speak fully in complete sentences.  NEURO: Cranial nerves grossly intact, mentation intact and speech normal  PSYCH: mentation appears normal, affect normal/bright, judgement and insight intact, normal speech and appearance well-groomed    Assessment/Plan:   (N40.1) Benign prostatic hyperplasia with lower urinary tract symptoms, symptom details unspecified  (primary encounter diagnosis)  Comment: PVR < 50 ml  Plan: MEASURE POST-VOID RESIDUAL URINE/BLADDER         CAPACITY, US NON-IMAGING        Cont with flomax/proscar    (R97.20) Elevated prostate specific antigen (PSA)  Comment: discussed pros and cons of psa screening after 75  Plan: prn.

## 2023-10-11 ENCOUNTER — IMMUNIZATION (OUTPATIENT)
Dept: NURSING | Facility: CLINIC | Age: 73
End: 2023-10-11
Payer: COMMERCIAL

## 2023-10-11 PROCEDURE — 90480 ADMN SARSCOV2 VAC 1/ONLY CMP: CPT

## 2023-10-11 PROCEDURE — 91320 SARSCV2 VAC 30MCG TRS-SUC IM: CPT

## 2024-01-16 ENCOUNTER — TRANSFERRED RECORDS (OUTPATIENT)
Dept: HEALTH INFORMATION MANAGEMENT | Facility: CLINIC | Age: 74
End: 2024-01-16

## 2024-02-14 DIAGNOSIS — M80.00XD AGE-RELATED OSTEOPOROSIS WITH CURRENT PATHOLOGICAL FRACTURE WITH ROUTINE HEALING, SUBSEQUENT ENCOUNTER: ICD-10-CM

## 2024-02-14 RX ORDER — ALENDRONATE SODIUM 70 MG/1
70 TABLET ORAL
Qty: 4 TABLET | Refills: 1 | Status: SHIPPED | OUTPATIENT
Start: 2024-02-14 | End: 2024-04-08

## 2024-04-01 SDOH — HEALTH STABILITY: PHYSICAL HEALTH: ON AVERAGE, HOW MANY MINUTES DO YOU ENGAGE IN EXERCISE AT THIS LEVEL?: 60 MIN

## 2024-04-01 SDOH — HEALTH STABILITY: PHYSICAL HEALTH: ON AVERAGE, HOW MANY DAYS PER WEEK DO YOU ENGAGE IN MODERATE TO STRENUOUS EXERCISE (LIKE A BRISK WALK)?: 7 DAYS

## 2024-04-01 ASSESSMENT — SOCIAL DETERMINANTS OF HEALTH (SDOH): HOW OFTEN DO YOU GET TOGETHER WITH FRIENDS OR RELATIVES?: ONCE A WEEK

## 2024-04-08 ENCOUNTER — OFFICE VISIT (OUTPATIENT)
Dept: FAMILY MEDICINE | Facility: CLINIC | Age: 74
End: 2024-04-08
Payer: COMMERCIAL

## 2024-04-08 VITALS
SYSTOLIC BLOOD PRESSURE: 134 MMHG | HEIGHT: 72 IN | BODY MASS INDEX: 24.38 KG/M2 | DIASTOLIC BLOOD PRESSURE: 80 MMHG | TEMPERATURE: 98.1 F | OXYGEN SATURATION: 99 % | RESPIRATION RATE: 16 BRPM | WEIGHT: 180 LBS | HEART RATE: 79 BPM

## 2024-04-08 DIAGNOSIS — G89.29 CHRONIC RIGHT-SIDED LOW BACK PAIN, UNSPECIFIED WHETHER SCIATICA PRESENT: ICD-10-CM

## 2024-04-08 DIAGNOSIS — Z00.00 ENCOUNTER FOR MEDICARE ANNUAL WELLNESS EXAM: Primary | ICD-10-CM

## 2024-04-08 DIAGNOSIS — M80.00XD AGE-RELATED OSTEOPOROSIS WITH CURRENT PATHOLOGICAL FRACTURE WITH ROUTINE HEALING, SUBSEQUENT ENCOUNTER: ICD-10-CM

## 2024-04-08 DIAGNOSIS — M54.50 CHRONIC RIGHT-SIDED LOW BACK PAIN, UNSPECIFIED WHETHER SCIATICA PRESENT: ICD-10-CM

## 2024-04-08 DIAGNOSIS — Z96.642 S/P TOTAL LEFT HIP ARTHROPLASTY: ICD-10-CM

## 2024-04-08 DIAGNOSIS — I10 HYPERTENSION GOAL BP (BLOOD PRESSURE) < 140/90: ICD-10-CM

## 2024-04-08 DIAGNOSIS — Z23 HIGH PRIORITY FOR 2019-NCOV VACCINE: ICD-10-CM

## 2024-04-08 DIAGNOSIS — E78.5 HYPERLIPIDEMIA LDL GOAL <130: ICD-10-CM

## 2024-04-08 LAB
ANION GAP SERPL CALCULATED.3IONS-SCNC: 8 MMOL/L (ref 7–15)
BUN SERPL-MCNC: 21.2 MG/DL (ref 8–23)
CALCIUM SERPL-MCNC: 9.3 MG/DL (ref 8.8–10.2)
CHLORIDE SERPL-SCNC: 101 MMOL/L (ref 98–107)
CHOLEST SERPL-MCNC: 155 MG/DL
CREAT SERPL-MCNC: 0.84 MG/DL (ref 0.67–1.17)
DEPRECATED HCO3 PLAS-SCNC: 33 MMOL/L (ref 22–29)
EGFRCR SERPLBLD CKD-EPI 2021: >90 ML/MIN/1.73M2
FASTING STATUS PATIENT QL REPORTED: YES
GLUCOSE SERPL-MCNC: 112 MG/DL (ref 70–99)
HDLC SERPL-MCNC: 64 MG/DL
LDLC SERPL CALC-MCNC: 77 MG/DL
NONHDLC SERPL-MCNC: 91 MG/DL
POTASSIUM SERPL-SCNC: 4.1 MMOL/L (ref 3.4–5.3)
SODIUM SERPL-SCNC: 142 MMOL/L (ref 135–145)
TRIGL SERPL-MCNC: 71 MG/DL

## 2024-04-08 PROCEDURE — 80061 LIPID PANEL: CPT | Performed by: FAMILY MEDICINE

## 2024-04-08 PROCEDURE — 90480 ADMN SARSCOV2 VAC 1/ONLY CMP: CPT | Performed by: FAMILY MEDICINE

## 2024-04-08 PROCEDURE — 36415 COLL VENOUS BLD VENIPUNCTURE: CPT | Performed by: FAMILY MEDICINE

## 2024-04-08 PROCEDURE — 99397 PER PM REEVAL EST PAT 65+ YR: CPT | Mod: 25 | Performed by: FAMILY MEDICINE

## 2024-04-08 PROCEDURE — 91320 SARSCV2 VAC 30MCG TRS-SUC IM: CPT | Performed by: FAMILY MEDICINE

## 2024-04-08 PROCEDURE — 99214 OFFICE O/P EST MOD 30 MIN: CPT | Mod: 25 | Performed by: FAMILY MEDICINE

## 2024-04-08 PROCEDURE — 80048 BASIC METABOLIC PNL TOTAL CA: CPT | Performed by: FAMILY MEDICINE

## 2024-04-08 RX ORDER — AMLODIPINE BESYLATE 2.5 MG/1
2.5 TABLET ORAL DAILY
Qty: 90 TABLET | Refills: 3 | Status: SHIPPED | OUTPATIENT
Start: 2024-04-08

## 2024-04-08 RX ORDER — GABAPENTIN 100 MG/1
100 CAPSULE ORAL 2 TIMES DAILY
Qty: 180 CAPSULE | Refills: 3 | Status: SHIPPED | OUTPATIENT
Start: 2024-04-08

## 2024-04-08 RX ORDER — POTASSIUM CHLORIDE 750 MG/1
TABLET, EXTENDED RELEASE ORAL
Qty: 90 TABLET | Refills: 3 | Status: SHIPPED | OUTPATIENT
Start: 2024-04-08

## 2024-04-08 RX ORDER — HYDROCHLOROTHIAZIDE 25 MG/1
TABLET ORAL
Qty: 90 TABLET | Refills: 3 | Status: SHIPPED | OUTPATIENT
Start: 2024-04-08

## 2024-04-08 RX ORDER — LOSARTAN POTASSIUM 25 MG/1
25 TABLET ORAL DAILY
Qty: 90 TABLET | Refills: 3 | Status: SHIPPED | OUTPATIENT
Start: 2024-04-08

## 2024-04-08 RX ORDER — ALENDRONATE SODIUM 70 MG/1
70 TABLET ORAL
Qty: 4 TABLET | Refills: 1 | Status: SHIPPED | OUTPATIENT
Start: 2024-04-08 | End: 2024-04-09

## 2024-04-08 RX ORDER — SIMVASTATIN 10 MG
TABLET ORAL
Qty: 90 TABLET | Refills: 3 | Status: SHIPPED | OUTPATIENT
Start: 2024-04-08

## 2024-04-08 ASSESSMENT — PAIN SCALES - GENERAL: PAINLEVEL: NO PAIN (0)

## 2024-04-08 NOTE — PATIENT INSTRUCTIONS
Preventive Care Advice   This is general advice given by our system to help you stay healthy. However, your care team may have specific advice just for you. Please talk to your care team about your preventive care needs.  Nutrition  Eat 5 or more servings of fruits and vegetables each day.  Try wheat bread, brown rice and whole grain pasta (instead of white bread, rice, and pasta).  Get enough calcium and vitamin D. Check the label on foods and aim for 100% of the RDA (recommended daily allowance).  Lifestyle  Exercise at least 150 minutes each week   (30 minutes a day, 5 days a week).  Do muscle strengthening activities 2 days a week. These help control your weight and prevent disease.  No smoking.  Wear sunscreen to prevent skin cancer.  Have a dental exam and cleaning every 6 months.  Yearly exams  See your health care team every year to talk about:  Any changes in your health.  Any medicines your care team has prescribed.  Preventive care, family planning, and ways to prevent chronic diseases.  Shots (vaccines)   HPV shots (up to age 26), if you've never had them before.  Hepatitis B shots (up to age 59), if you've never had them before.  COVID-19 shot: Get this shot when it's due.  Flu shot: Get a flu shot every year.  Tetanus shot: Get a tetanus shot every 10 years.  Pneumococcal, hepatitis A, and RSV shots: Ask your care team if you need these based on your risk.  Shingles shot (for age 50 and up).  General health tests  Diabetes screening:  Starting at age 35, Get screened for diabetes at least every 3 years.  If you are younger than age 35, ask your care team if you should be screened for diabetes.  Cholesterol test: At age 39, start having a cholesterol test every 5 years, or more often if advised.  Bone density scan (DEXA): At age 50, ask your care team if you should have this scan for osteoporosis (brittle bones).  Hepatitis C: Get tested at least once in your life.  STIs (sexually transmitted  infections)  Before age 24: Ask your care team if you should be screened for STIs.  After age 24: Get screened for STIs if you're at risk. You are at risk for STIs (including HIV) if:  You are sexually active with more than one person.  You don't use condoms every time.  You or a partner was diagnosed with a sexually transmitted infection.  If you are at risk for HIV, ask about PrEP medicine to prevent HIV.  Get tested for HIV at least once in your life, whether you are at risk for HIV or not.  Cancer screening tests  Cervical cancer screening: If you have a cervix, begin getting regular cervical cancer screening tests at age 21. Most people who have regular screenings with normal results can stop after age 65. Talk about this with your provider.  Breast cancer scan (mammogram): If you've ever had breasts, begin having regular mammograms starting at age 40. This is a scan to check for breast cancer.  Colon cancer screening: It is important to start screening for colon cancer at age 45.  Have a colonoscopy test every 10 years (or more often if you're at risk) Or, ask your provider about stool tests like a FIT test every year or Cologuard test every 3 years.  To learn more about your testing options, visit: https://www.[employer]/137347.pdf.  For help making a decision, visit: https://bit.ly/al73516.  Prostate cancer screening test: If you have a prostate and are age 55 to 69, ask your provider if you would benefit from a yearly prostate cancer screening test.  Lung cancer screening: If you are a current or former smoker age 50 to 80, ask your care team if ongoing lung cancer screenings are right for you.  For informational purposes only. Not to replace the advice of your health care provider. Copyright   2023 [employer]. All rights reserved. Clinically reviewed by the [facility]. [employer] [id number] - REV 01/24.    Learning About Stress  What is stress?     Stress is your  body's response to a hard situation. Your body can have a physical, emotional, or mental response. Stress is a fact of life for most people, and it affects everyone differently. What causes stress for you may not be stressful for someone else.  A lot of things can cause stress. You may feel stress when you go on a job interview, take a test, or run a race. This kind of short-term stress is normal and even useful. It can help you if you need to work hard or react quickly. For example, stress can help you finish an important job on time.  Long-term stress is caused by ongoing stressful situations or events. Examples of long-term stress include long-term health problems, ongoing problems at work, or conflicts in your family. Long-term stress can harm your health.  How does stress affect your health?  When you are stressed, your body responds as though you are in danger. It makes hormones that speed up your heart, make you breathe faster, and give you a burst of energy. This is called the fight-or-flight stress response. If the stress is over quickly, your body goes back to normal and no harm is done.  But if stress happens too often or lasts too long, it can have bad effects. Long-term stress can make you more likely to get sick, and it can make symptoms of some diseases worse. If you tense up when you are stressed, you may develop neck, shoulder, or low back pain. Stress is linked to high blood pressure and heart disease.  Stress also harms your emotional health. It can make you jo, tense, or depressed. Your relationships may suffer, and you may not do well at work or school.  What can you do to manage stress?  You can try these things to help manage stress:   Do something active. Exercise or activity can help reduce stress. Walking is a great way to get started. Even everyday activities such as housecleaning or yard work can help.  Try yoga or xavier chi. These techniques combine exercise and meditation. You may need  some training at first to learn them.  Do something you enjoy. For example, listen to music or go to a movie. Practice your hobby or do volunteer work.  Meditate. This can help you relax, because you are not worrying about what happened before or what may happen in the future.  Do guided imagery. Imagine yourself in any setting that helps you feel calm. You can use online videos, books, or a teacher to guide you.  Do breathing exercises. For example:  From a standing position, bend forward from the waist with your knees slightly bent. Let your arms dangle close to the floor.  Breathe in slowly and deeply as you return to a standing position. Roll up slowly and lift your head last.  Hold your breath for just a few seconds in the standing position.  Breathe out slowly and bend forward from the waist.  Let your feelings out. Talk, laugh, cry, and express anger when you need to. Talking with supportive friends or family, a counselor, or a clarence leader about your feelings is a healthy way to relieve stress. Avoid discussing your feelings with people who make you feel worse.  Write. It may help to write about things that are bothering you. This helps you find out how much stress you feel and what is causing it. When you know this, you can find better ways to cope.  What can you do to prevent stress?  You might try some of these things to help prevent stress:  Manage your time. This helps you find time to do the things you want and need to do.  Get enough sleep. Your body recovers from the stresses of the day while you are sleeping.  Get support. Your family, friends, and community can make a difference in how you experience stress.  Limit your news feed. Avoid or limit time on social media or news that may make you feel stressed.  Do something active. Exercise or activity can help reduce stress. Walking is a great way to get started.  Where can you learn more?  Go to https://www.healthwise.net/patiented  Enter N032 in the  "search box to learn more about \"Learning About Stress.\"  Current as of: October 24, 2023               Content Version: 14.0    [id number] [employer].   Care instructions adapted under license by your healthcare professional. If you have questions about a medical condition or this instruction, always ask your healthcare professional. [employer] disclaims any warranty or liability for your use of this information.      "

## 2024-04-08 NOTE — PROGRESS NOTES
Preventive Care Visit  [facility] TATIANA Deluca MD, Family Medicine  Apr 8, 2024      Assessment  (Z00.00) Encounter for Medicare annual wellness exam  (primary encounter diagnosis)  Comment:   Plan:     (M80.00XD) Age-related osteoporosis with current pathological fracture with routine healing, subsequent encounter  Comment: stable   Plan: alendronate (FOSAMAX) 70 MG tablet            (I10) Hypertension goal BP (blood pressure) < 140/90  Comment: controlled   Plan: BASIC METABOLIC PANEL, amLODIPine (NORVASC) 2.5        MG tablet, hydrochlorothiazide (HYDRODIURIL) 25        MG tablet, losartan (COZAAR) 25 MG tablet,         potassium chloride ER (K-TAB/KLOR-CON) 10 MEQ         CR tablet            (M54.50,  G89.29) Chronic right-sided low back pain, unspecified whether sciatica present  Comment: refilled  Plan: gabapentin (NEURONTIN) 100 MG capsule            (E78.5) Hyperlipidemia LDL goal <130  Comment: pending labs   Plan: Lipid panel reflex to direct LDL Non-fasting,         simvastatin (ZOCOR) 10 MG tablet            (Z96.642) S/P total left hip arthroplasty  Comment: stable   Plan:     (Z23) High priority for 2019-nCoV vaccine  Comment: discussed   Plan:       Subjective   Steve is a 73 year old, presenting for the following:  Physical and Imm/Inj (COVID-19 VACCINE)        4/8/2024     8:23 AM   Additional Questions   Roomed by [facility] Care Directive  Patient does not have a Health Care Directive or Living Will: Discussed advance care planning with patient; information given to patient to review.    HPI      Hyperlipidemia Follow-Up    Are you regularly taking any medication or supplement to lower your cholesterol?   Yes- statin  Are you having muscle aches or other side effects that you think could be caused by your cholesterol lowering medication?  No    Hypertension Follow-up    Do you check your blood pressure regularly outside of the clinic? yes  Are you following a low  salt diet? Yes  Are your blood pressures ever more than 140 on the top number (systolic) OR more   than 90 on the bottom number (diastolic), for example 140/90? No  Pt takes gabapentin when he has pain only and it helps  He is doing well on fosamax and has had no side effects       4/1/2024   General Health   How would you rate your overall physical health? Good   Feel stress (tense, anxious, or unable to sleep) Only a little   (!) STRESS CONCERN      4/1/2024   Nutrition   Diet: Regular (no restrictions)         4/1/2024   Exercise   Days per week of moderate/strenous exercise 7 days   Average minutes spent exercising at this level 60 min         4/1/2024   Social Factors   Frequency of gathering with friends or relatives Once a week   Worry food won't last until get money to buy more No   Food not last or not have enough money for food? No   Do you have housing?  Yes   Are you worried about losing your housing? No   Lack of transportation? No   Unable to get utilities (heat,electricity)? No         4/1/2024   Fall Risk   Fallen 2 or more times in the past year? No   Trouble with walking or balance? No          4/1/2024   Activities of Daily Living- Home Safety   Needs help with the following daily activites None of the above   Safety concerns in the home None of the above         4/1/2024   Dental   Dentist two times every year? Yes         4/1/2024   Hearing Screening   Hearing concerns? None of the above         4/1/2024   Driving Risk Screening   Patient/family members have concerns about driving No         4/1/2024   General Alertness/Fatigue Screening   Have you been more tired than usual lately? No         4/1/2024   Urinary Incontinence Screening   Bothered by leaking urine in past 6 months No         4/1/2024   TB Screening   Were you born outside of the [place]? No         Today's PHQ-2 Score:       4/7/2024     9:52 AM   PHQ-2 ( 1999 [employer])   Q1: Little interest or pleasure in doing things 0   Q2: Feeling  down, depressed or hopeless 0   PHQ-2 Score 0   Q1: Little interest or pleasure in doing things Not at all   Q2: Feeling down, depressed or hopeless Not at all   PHQ-2 Score 0           4/1/2024   Substance Use   Alcohol more than 3/day or more than 7/wk Not Applicable   Do you have a current opioid prescription? No   How severe/bad is pain from 1 to 10? 2/10   Do you use any other substances recreationally? No     Social History     Tobacco Use    Smoking status: Never    Smokeless tobacco: Never   Substance Use Topics    Alcohol use: Not Currently     Comment: rare    Drug use: No           4/1/2024   AAA Screening   Family history of Abdominal Aortic Aneurysm (AAA)? No   ASCVD Risk   The 10-year ASCVD risk score (Tea ZAMBRANO, et al., 2019) is: 25%    Values used to calculate the score:      Age: 73 years      Sex: Male      Is Non- : No      Diabetic: No      Tobacco smoker: No      Systolic Blood Pressure: 134 mmHg      Is BP treated: Yes      HDL Cholesterol: 66 mg/dL      Total Cholesterol: 136 mg/dL            Reviewed and updated as needed this visit by Provider   Tobacco  Allergies  Meds  Problems  Med Hx  Surg Hx  Fam Hx            Past Medical History:   Diagnosis Date    BPH (benign prostatic hypertrophy)     Chronic low back pain 2001    Colon polyp 2000    Hyperlipidemia     Hypertension     Prostatitis     recurrent     Past Surgical History:   Procedure Laterality Date    ABDOMEN SURGERY  1978    Right ureter reimplantation    APPENDECTOMY      BIOPSY  five times    Prostate    COLONOSCOPY  1/6/2018    HERNIORRHAPHY INGUINAL Right 2/2/2022    Procedure: OPEN RIGHT INGUINAL HERNIORRHAPHY WITH MESH;  Surgeon: Phu Melo MD;  Location: [facility] OR    HRW CORNEAL TRANSPLANT, CRNA      LASIK  2005    TURP  1998    x 2    [facility] GENITAL SURG PROC,MALE UNLISTED  2001    left hydrocele    [facility] GENITAL SURG PROC,MALE UNLISTED  1977    right ureteral reimplantation      OB History   No obstetric history on file.     Lab work is in process  Labs reviewed in EPIC  BP Readings from Last 3 Encounters:   04/08/24 134/80   09/12/23 139/81   05/02/23 136/81    Wt Readings from Last 3 Encounters:   04/08/24 81.6 kg (180 lb)   09/12/23 85.3 kg (188 lb)   05/02/23 85.5 kg (188 lb 9.6 oz)                  Patient Active Problem List   Diagnosis    GERD (gastroesophageal reflux disease)    Hyperlipidemia LDL goal <130    Hypertension goal BP (blood pressure) < 140/90    Advanced directives, counseling/discussion    Hypertrophy of prostate with urinary obstruction    Chronic right-sided low back pain, unspecified whether sciatica present    Closed fracture of left hip with routine healing, subsequent encounter    S/P total left hip arthroplasty     Past Surgical History:   Procedure Laterality Date    ABDOMEN SURGERY  1978    Right ureter reimplantation    APPENDECTOMY      BIOPSY  five times    Prostate    COLONOSCOPY  1/6/2018    HERNIORRHAPHY INGUINAL Right 2/2/2022    Procedure: OPEN RIGHT INGUINAL HERNIORRHAPHY WITH MESH;  Surgeon: Phu Melo MD;  Location: [facility] OR    HRW CORNEAL TRANSPLANT, CRNA      LASIK  2005    TURP  1998    x 2    [facility] GENITAL SURG PROC,MALE UNLISTED  2001    left hydrocele    [facility] GENITAL SURG PROC,MALE UNLISTED  1977    right ureteral reimplantation       Social History     Tobacco Use    Smoking status: Never    Smokeless tobacco: Never   Substance Use Topics    Alcohol use: Not Currently     Comment: rare     Family History   Problem Relation Age of Onset    C.A.D. Father 64        d. MI    Hypertension Father     Hypertension Mother     Cancer Mother         skin    Cerebrovascular Disease Mother     Diabetes No family hx of     Cancer - colorectal No family hx of          Current Outpatient Medications   Medication Sig Dispense Refill    alendronate (FOSAMAX) 70 MG tablet Take 1 tablet (70 mg) by mouth every 7 days 4 tablet 1    amLODIPine  (NORVASC) 2.5 MG tablet Take 1 tablet (2.5 mg) by mouth daily 90 tablet 3    finasteride (PROSCAR) 5 MG tablet Take 1 tablet (5 mg) by mouth daily 90 tablet 3    gabapentin (NEURONTIN) 100 MG capsule Take 1 capsule (100 mg) by mouth 2 times daily 180 capsule 3    hydrochlorothiazide (HYDRODIURIL) 25 MG tablet TAKE 1 TABLET BY MOUTH ONCE DAILY . 90 tablet 3    losartan (COZAAR) 25 MG tablet Take 1 tablet (25 mg) by mouth daily 90 tablet 3    Multiple Vitamins-Minerals (MULTI FOR HIM PO) Take  by mouth daily.      omeprazole (PRILOSEC) 20 MG DR capsule Take 20 mg by mouth daily      potassium chloride ER (K-TAB/KLOR-CON) 10 MEQ CR tablet TAKE 1 TABLET BY MOUTH ONCE DAILY . 90 tablet 3    simvastatin (ZOCOR) 10 MG tablet TAKE 1 TABLET BY MOUTH AT BEDTIME . 90 tablet 3    tamsulosin (FLOMAX) 0.4 MG capsule Take 1 capsule (0.4 mg) by mouth At Bedtime 90 capsule 3     No Known Allergies  Recent Labs   Lab Test 05/02/23  1104 04/10/23  0952 01/24/22  1154 02/08/21  0941 04/23/20  1044 04/08/19  0927 10/22/18  0912   LDL  --  53 62 70 67 55 67   HDL  --  66 63 64 70 66 60   TRIG  --  83 58 70 56 62 60   ALT  --   --   --   --  28 23 26   CR 0.76  --  0.78 0.86 0.86 0.90 0.86   GFRESTIMATED >90  --  >90 86 87 86 88   GFRESTBLACK  --   --   --  >90 >90 >90 >90   POTASSIUM 4.0  --  4.2 4.1 4.4 4.0 3.9      Current providers sharing in care for this patient include:  Patient Care Team:  Bita Deluca MD as PCP - General  Bita Deluca MD as Assigned PCP  Talya Barlow MD as MD (Dermatology)  Semaj Sotelo MD as MD (Urology)  Semaj Sotelo MD as Assigned Surgical Provider    The following health maintenance items are reviewed in Epic and correct as of today:  Health Maintenance   Topic Date Due    LIPID  04/10/2024    BMP  05/02/2024    MEDICARE ANNUAL WELLNESS VISIT  04/08/2025    ANNUAL REVIEW OF HM ORDERS  04/08/2025    FALL RISK ASSESSMENT  04/08/2025    COLORECTAL CANCER SCREENING  01/31/2026    GLUCOSE   "05/02/2026    DTAP/TDAP/TD IMMUNIZATION (3 - Td or Tdap) 03/20/2028    ADVANCE CARE PLANNING  04/10/2028    HEPATITIS C SCREENING  Completed    PHQ-2 (once per calendar year)  Completed    INFLUENZA VACCINE  Completed    Pneumococcal Vaccine: 65+ Years  Completed    ZOSTER IMMUNIZATION  Completed    RSV VACCINE (Pregnancy & 60+)  Completed    COVID-19 Vaccine  Completed    IPV IMMUNIZATION  Aged Out    HPV IMMUNIZATION  Aged Out    MENINGITIS IMMUNIZATION  Aged Out    RSV MONOCLONAL ANTIBODY  Aged Out         Review of Systems  CONSTITUTIONAL: NEGATIVE for fever, chills, change in weight  INTEGUMENTARY/SKIN: NEGATIVE for worrisome rashes, moles or lesions  EYES: NEGATIVE for vision changes or irritation  ENT/MOUTH: NEGATIVE for ear, mouth and throat problems  RESP: NEGATIVE for significant cough or SOB  BREAST: NEGATIVE for masses, tenderness or discharge  CV: NEGATIVE for chest pain, palpitations or peripheral edema  GI: NEGATIVE for nausea, abdominal pain, heartburn, or change in bowel habits  : NEGATIVE for frequency, dysuria, or hematuria  MUSCULOSKELETAL: NEGATIVE for significant arthralgias or myalgia  NEURO: NEGATIVE for weakness, dizziness or paresthesias  ENDOCRINE: NEGATIVE for temperature intolerance, skin/hair changes  HEME: NEGATIVE for bleeding problems  PSYCHIATRIC: NEGATIVE for changes in mood or affect     Objective    Exam  /80   Pulse 79   Temp 98.1  F (36.7  C) (Temporal)   Resp 16   Ht 1.836 m (6' 0.28\")   Wt 81.6 kg (180 lb)   SpO2 99%   BMI 24.22 kg/m     Estimated body mass index is 24.22 kg/m  as calculated from the following:    Height as of this encounter: 1.836 m (6' 0.28\").    Weight as of this encounter: 81.6 kg (180 lb).    Physical Exam  GENERAL: alert and no distress  EYES: Eyes grossly normal to inspection, PERRL and conjunctivae and sclerae normal  HENT: ear canals and TM's normal, nose and mouth without ulcers or lesions  NECK: no adenopathy, no asymmetry, masses, " or scars  RESP: lungs clear to auscultation - no rales, rhonchi or wheezes  CV: regular rate and rhythm, normal S1 S2, no S3 or S4, no murmur, click or rub, no peripheral edema  ABDOMEN: soft, nontender, no hepatosplenomegaly, no masses and bowel sounds normal  MS: no gross musculoskeletal defects noted, no edema  SKIN: no suspicious lesions or rashes  NEURO: Normal strength and tone, mentation intact and speech normal  PSYCH: mentation appears normal, affect normal/bright        4/8/2024   Mini Cog   Clock Draw Score 2 Normal   3 Item Recall 3 objects recalled   Mini Cog Total Score 5              Signed Electronically by: Bita Deluca MD

## 2024-04-09 DIAGNOSIS — M80.00XD AGE-RELATED OSTEOPOROSIS WITH CURRENT PATHOLOGICAL FRACTURE WITH ROUTINE HEALING, SUBSEQUENT ENCOUNTER: ICD-10-CM

## 2024-04-09 RX ORDER — ALENDRONATE SODIUM 70 MG/1
70 TABLET ORAL WEEKLY
Qty: 12 TABLET | Refills: 2 | Status: SHIPPED | OUTPATIENT
Start: 2024-04-09

## 2024-09-13 ENCOUNTER — OFFICE VISIT (OUTPATIENT)
Dept: UROLOGY | Facility: CLINIC | Age: 74
End: 2024-09-13
Payer: COMMERCIAL

## 2024-09-13 VITALS
HEART RATE: 85 BPM | WEIGHT: 192 LBS | OXYGEN SATURATION: 100 % | DIASTOLIC BLOOD PRESSURE: 84 MMHG | BODY MASS INDEX: 25.84 KG/M2 | SYSTOLIC BLOOD PRESSURE: 150 MMHG

## 2024-09-13 DIAGNOSIS — I10 HYPERTENSION GOAL BP (BLOOD PRESSURE) < 140/90: ICD-10-CM

## 2024-09-13 DIAGNOSIS — N40.1 BENIGN PROSTATIC HYPERPLASIA WITH LOWER URINARY TRACT SYMPTOMS, SYMPTOM DETAILS UNSPECIFIED: Primary | ICD-10-CM

## 2024-09-13 DIAGNOSIS — R97.20 ELEVATED PROSTATE SPECIFIC ANTIGEN (PSA): ICD-10-CM

## 2024-09-13 PROCEDURE — 51798 US URINE CAPACITY MEASURE: CPT | Performed by: UROLOGY

## 2024-09-13 PROCEDURE — 99213 OFFICE O/P EST LOW 20 MIN: CPT | Mod: 25 | Performed by: UROLOGY

## 2024-09-13 NOTE — PROGRESS NOTES
No chief complaint on file.      Steve José is a 74 year old male who presents today for follow up of   No chief complaint on file.   Follow up for elevated psa/benign prostatic hyperplasia.  He is s/p biopsy of prostate many years ago.   He is on flomax/proscar with minimal changes in LUTS.   He has h/o urinary retention in the past.    Current Outpatient Medications   Medication Sig Dispense Refill    alendronate (FOSAMAX) 70 MG tablet TAKE 1 TABLET BY MOUTH ONCE A WEEK 12 tablet 2    amLODIPine (NORVASC) 2.5 MG tablet Take 1 tablet (2.5 mg) by mouth daily 90 tablet 3    finasteride (PROSCAR) 5 MG tablet Take 1 tablet (5 mg) by mouth daily 90 tablet 3    hydrochlorothiazide (HYDRODIURIL) 25 MG tablet TAKE 1 TABLET BY MOUTH ONCE DAILY . 90 tablet 3    losartan (COZAAR) 25 MG tablet Take 1 tablet (25 mg) by mouth daily 90 tablet 3    Multiple Vitamins-Minerals (MULTI FOR HIM PO) Take  by mouth daily.      omeprazole (PRILOSEC) 20 MG DR capsule Take 20 mg by mouth daily      potassium chloride ER (K-TAB/KLOR-CON) 10 MEQ CR tablet TAKE 1 TABLET BY MOUTH ONCE DAILY . 90 tablet 3    simvastatin (ZOCOR) 10 MG tablet TAKE 1 TABLET BY MOUTH AT BEDTIME . 90 tablet 3    tamsulosin (FLOMAX) 0.4 MG capsule Take 1 capsule (0.4 mg) by mouth At Bedtime 90 capsule 3    gabapentin (NEURONTIN) 100 MG capsule Take 1 capsule (100 mg) by mouth 2 times daily (Patient not taking: Reported on 9/13/2024) 180 capsule 3     No Known Allergies   Past Medical History:   Diagnosis Date    BPH (benign prostatic hypertrophy)     Chronic low back pain 2001    Colon polyp 2000    Hyperlipidemia     Hypertension     Prostatitis     recurrent     Past Surgical History:   Procedure Laterality Date    ABDOMEN SURGERY  1978    Right ureter reimplantation    APPENDECTOMY      BIOPSY  five times    Prostate    COLONOSCOPY  1/6/2018    HERNIORRHAPHY INGUINAL Right 2/2/2022    Procedure: OPEN RIGHT INGUINAL HERNIORRHAPHY WITH MESH;  Surgeon: Lorie  Phu Powell MD;  Location: [facility] OR    HRW CORNEAL TRANSPLANT, CRNA      LASIK  2005    TURP  1998    x 2    [facility] GENITAL SURG PROC,MALE UNLISTED  2001    left hydrocele    [facility] GENITAL SURG PROC,MALE UNLISTED  1977    right ureteral reimplantation     Family History   Problem Relation Age of Onset    C.A.D. Father 64        d. MI    Hypertension Father     Hypertension Mother     Cancer Mother         skin    Cerebrovascular Disease Mother     Diabetes No family hx of     Cancer - colorectal No family hx of      Social History     Socioeconomic History    Marital status:      Spouse name: Amy     Number of children: 0    Years of education: 15    Highest education level: None   Occupational History    Occupation: printing company sales     Employer: RETIRED   Tobacco Use    Smoking status: Never    Smokeless tobacco: Never   Substance and Sexual Activity    Alcohol use: Not Currently     Comment: rare    Drug use: No    Sexual activity: Yes     Partners: Female     Birth control/protection: None   Other Topics Concern    Parent/sibling w/ CABG, MI or angioplasty before 65F 55M? No     Service No    Blood Transfusions No     Comment: unsure    Caffeine Concern No    Occupational Exposure No    Hobby Hazards No    Sleep Concern No    Stress Concern No    Weight Concern No    Special Diet No     Comment: watches intake of salt and sugar    Back Care No    Exercise Yes     Comment: everyday goes for mile and a half to two mile walk    Bike Helmet No    Seat Belt Yes    Self-Exams Yes       REVIEW OF SYSTEMS  =================  C: NEGATIVE for fever, chills, change in weight  I: NEGATIVE for worrisome rashes, moles or lesions  E/M: NEGATIVE for ear, mouth and throat problems  R: NEGATIVE for significant cough or SHORTNESS OF BREATH  CV:  NEGATIVE for chest pain, palpitations or peripheral edema  GI: NEGATIVE for nausea, abdominal pain, heartburn, or change in bowel habits  NEURO: NEGATIVE  numbness/weakness  : see HPI  PSYCH: NEGATIVE depression/anxiety  LYmph: no new enlarged lymph nodes  Ortho: no new trauma/movements    Physical Exam:  Blood pressure (!) 150/84, pulse 85, weight 87.1 kg (192 lb), SpO2 100%.    GENERAL: healthy, alert and no distress  EYES: Eyes grossly normal to inspection, conjunctivae and sclerae normal  RESP: no audible wheeze, cough, or visible cyanosis.  No visible retractions or increased work of breathing.  Able to speak fully in complete sentences.  NEURO: Cranial nerves grossly intact, mentation intact and speech normal  PSYCH: mentation appears normal, affect normal/bright, judgement and insight intact, normal speech and appearance well-groomed    Assessment/Plan:   (N40.1) Benign prostatic hyperplasia with lower urinary tract symptoms, symptom details unspecified  (primary encounter diagnosis)  Comment: PVR < 50 ml  Plan: MEASURE POST-VOID RESIDUAL URINE/BLADDER         CAPACITY, US NON-IMAGING        Cont with flomax/proscar          Info on Rezume provided    Elevated psa:  prn    HTN: Steve to follow up with Primary Care provider regarding elevated blood pressure.

## 2024-09-13 NOTE — PROGRESS NOTES
Writer called and spoke with patient about Rezum procedure scheduling.  Last cystoscopy: WOULD NEED A CYSTO PRIOR TO REZUM, MD AND PT AWARE OF THIS  Pre Rezum AUA= TBD  Patient currently on blood thinners? No    Education documentation:  To improve patient experience and health outcomes, patient received general discussion/verbal explanation and print out on Rezum via AVS. Patient acknowledged understanding on the education.       Jeanie ZACARIAS RN Urology 9/13/2024 8:42 AM

## 2024-09-13 NOTE — PATIENT INSTRUCTIONS
Semaj?mTM Water Vapor Therapy Patient  Instructions Pre Treatment    What is the Rezum procedure?   Rezum is a procedure to treat benign prostatic hyperplasia (BPH) that can be performed in a clinic or outpatient setting. Rezum uses targeted, controlled doses of thermal energy in water vapor (steam) to treat the extra prostate tissue that is causing symptoms such as frequency, urgency, irregular flow, weak stream, straining to urinate and frequent nighttime urination.   **To learn more about REZUM therapy and how it can safely and effectively reduce your BPH symptoms, visit [email or web address]**    What happens on the day of the Rezum procedure?   On the day of the procedure, a light meal and lots of fluids are recommended. The procedure itself is quick and simple, taking only a single office visit. Depending on the size of the prostate, the procedure consists of two to seven injections, each nine seconds long. Upon arriving, you will be instructed to eliminate urine, and the bladder is then checked. Both the prostate and the penis are numbed. The Semaj?m device is inserted, a needle is deployed, and vapor is injected into the prostate for nine seconds. This vapor disperses between cells, then cools, releases heat, and gently disrupts the prostate's cells. Because of the initial swelling, a catheter is then inserted, which will remain for 7 days.    Rezum appointment information    Start the antibiotics THE DAY BEFORE THE PROCEDURE. Continue taking until they are gone.    2. Purchase a Fleets  enema at your local pharmacy and administer this 1 to 2 hours prior to your scheduled appointment time.    3. You may possibly need to leave a urine specimen when you arrive at the office.    4. Please follow these instructions for holding blood thinning medications:    Vitamin E, fish oil, aspirin, baby aspirin --7 days prior    Plavix, Brilinta, ibuprofen (Advil, Motrin, Aleve) - 7 days prior    Pradaxa - 5 days  prior    Coumadin/warfarin - Consult your prescribing provider before holding.    Eliquis, Xarelto - Consult your prescribing provider before holding.      POST REZUM APPOINTMENTS    RN for batres catheter removal- 7 days after your procedure    Dr. Sotelo- 3 month follow up appointment    How long will the procedure take?  The Rezum procedure will take about 30 minutes. Please allow 1-2 hours for the appointment (including pre and post).    If you have any questions call Jeanie directly at [phone].

## 2024-09-13 NOTE — PROGRESS NOTES
2 COFFEE  2 Water  2 SODA  2 Milk    Bladder scan performed 34ml detected in bladder. Mireille Mackey, CMA

## 2024-09-17 ENCOUNTER — VIRTUAL VISIT (OUTPATIENT)
Dept: FAMILY MEDICINE | Facility: CLINIC | Age: 74
End: 2024-09-17
Payer: COMMERCIAL

## 2024-09-17 ENCOUNTER — TELEPHONE (OUTPATIENT)
Dept: FAMILY MEDICINE | Facility: CLINIC | Age: 74
End: 2024-09-17

## 2024-09-17 DIAGNOSIS — I10 HYPERTENSION GOAL BP (BLOOD PRESSURE) < 140/90: Primary | ICD-10-CM

## 2024-09-17 DIAGNOSIS — U07.1 INFECTION DUE TO 2019 NOVEL CORONAVIRUS: ICD-10-CM

## 2024-09-17 PROCEDURE — 99441 PR PHYSICIAN TELEPHONE EVALUATION 5-10 MIN: CPT | Mod: 93 | Performed by: PHYSICIAN ASSISTANT

## 2024-09-17 NOTE — PROGRESS NOTES
Steve is a 74 year old who is being evaluated via a billable telephone visit.    What phone number would you like to be contacted at? [phone]   How would you like to obtain your AVS? Tarik  Originating Location (pt. Location): Home    Distant Location (provider location):  Off-site  11:13-11:20  Assessment & Plan         Infection due to 2019 novel coronavirus  Higher risk due to age and chronic disease.  Discussed reasons to go to er and discussed med adjustments while on this med.    - nirmatrelvir and ritonavir (PAXLOVID) 300 mg/100 mg therapy pack; Take 3 tablets by mouth 2 times daily for 5 days. (Take 2 Nirmatrelvir tablets and 1 Ritonavir tablet twice daily for 5 days)                Subjective   Steve is a 74 year old, presenting for the following health issues:  Suspected Covid      9/17/2024    10:41 AM   Additional Questions   Roomed by Mary   Accompanied by self     History of Present Illness       Reason for visit:  Covid   He is taking medications regularly.         COVID-19 Symptom Review   Positive Covid this anand   How many days ago did these symptoms start? 2 days     Are any of the following symptoms significant for you?  New or worsening difficulty breathing? No  Worsening cough? No  Fever or chills? Yes, the highest temperature was 99.4 this morning   Headache: YES on and off   Sore throat: No  Chest pain: No  Diarrhea: No  Body aches? YES        Sweaty   Wife is positive too.    What treatments has patient tried? Acetaminophen and Antihistamines    Does patient live in a nursing home, group home, or shelter? No  Does patient have a way to get food/medications during quarantined? Yes, I have a friend or family member who can help me.                    Objective           Vitals:  No vitals were obtained today due to virtual visit.    Physical Exam   General: Alert and no distress //Respiratory: No audible wheeze, cough, or shortness of breath // Psychiatric:  Appropriate affect, tone,  and pace of words            Phone call duration: 7 minutes  Signed Electronically by: Vanessa Acuna PA-C

## 2024-09-17 NOTE — TELEPHONE ENCOUNTER
RN COVID TREATMENT VISIT  09/17/24      The patient has been triaged and does not require a higher level of care.    Steve ZACARIAS Arnav  74 year old  Current weight? 192 lbs    Has the patient been seen by a primary care provider at an [facility] or [facility] within the past two years? Yes.   Have you been in close proximity to/do you have a known exposure to a person with a confirmed case of influenza? No.     General treatment eligibility:  Date of positive COVID test (PCR or at home)?  9/17    Are you or have you been hospitalized for this COVID-19 infection? No.   Have you received monoclonal antibodies or antiviral treatment for COVID-19 since this positive test? No.   Do you have any of the following conditions that place you at risk of being very sick from COVID-19?   - Age 50 years or older  Yes, patient has at least one high risk condition as noted above.     Current COVID symptoms:   - fever or chills  - cough  - headache  - congestion or runny nose  Yes. Patient has at least one symptom as selected.     How many days since symptoms started? 5 days or less. Established patient, 12 years or older weighing at least 88.2 lbs, who has symptoms that started in the past 5 days, has not been hospitalized nor received treatment already, and is at risk for being very sick from COVID-19.     Treatment eligibility by RN:  Are you currently pregnant or nursing? No  Do you have a clinically significant hypersensitivity to nirmatrelvir or ritonavir, or toxic epidermal necrolysis (TEN) or Melendez-Quinn Syndrome? No  Do you have a history of hepatitis, any hepatic impairment on the Problem List (such as Child-Hampton Class C, cirrhosis, fatty liver disease, alcoholic liver disease), or was the last liver lab (hepatic panel, ALT, AST, ALK Phos, bilirubin) elevated in the past 6 months? No  Do you have any history of severe renal impairment (eGFR < 30mL/min)? No    Is patient eligible to continue?  Yes, patient meets all eligibility requirements for the RN COVID treatment (as denoted by all no responses above).     Current Outpatient Medications   Medication Sig Dispense Refill    alendronate (FOSAMAX) 70 MG tablet TAKE 1 TABLET BY MOUTH ONCE A WEEK 12 tablet 2    amLODIPine (NORVASC) 2.5 MG tablet Take 1 tablet (2.5 mg) by mouth daily 90 tablet 3    finasteride (PROSCAR) 5 MG tablet Take 1 tablet (5 mg) by mouth daily 90 tablet 3    gabapentin (NEURONTIN) 100 MG capsule Take 1 capsule (100 mg) by mouth 2 times daily (Patient not taking: Reported on 9/13/2024) 180 capsule 3    hydrochlorothiazide (HYDRODIURIL) 25 MG tablet TAKE 1 TABLET BY MOUTH ONCE DAILY . 90 tablet 3    losartan (COZAAR) 25 MG tablet Take 1 tablet (25 mg) by mouth daily 90 tablet 3    Multiple Vitamins-Minerals (MULTI FOR HIM PO) Take  by mouth daily.      omeprazole (PRILOSEC) 20 MG DR capsule Take 20 mg by mouth daily      potassium chloride ER (K-TAB/KLOR-CON) 10 MEQ CR tablet TAKE 1 TABLET BY MOUTH ONCE DAILY . 90 tablet 3    simvastatin (ZOCOR) 10 MG tablet TAKE 1 TABLET BY MOUTH AT BEDTIME . 90 tablet 3    tamsulosin (FLOMAX) 0.4 MG capsule Take 1 capsule (0.4 mg) by mouth At Bedtime 90 capsule 3       Medications from List 1 of the standing order (on medications that exclude the use of Paxlovid) that patient is taking: NONE. Is patient taking Dulce's Wort? No  Is patient taking Downey's Wort or any meds from List 1? No.   Medications from List 2 of the standing order (on meds that provider needs to adjust) that patient is taking: amlodipine (Norvasc), explained a provider visit is necessary to discuss medication adjustments while taking Paxlovid. Is patient on any of the meds from List 2? Yes. Patient will be scheduled or transferred to a  at the end of this call.     Dmitri Coronado RN

## 2024-09-17 NOTE — PATIENT INSTRUCTIONS
Don't take simvastatin today and hold for 5 days after finishing paxlovid  Hold flomax and restart 3 days after finishing paxlovid   Stop amlodipine and hold for 3 days after finishing paxlovid   Patient Education

## 2024-09-24 DIAGNOSIS — N40.1 BENIGN PROSTATIC HYPERPLASIA WITH LOWER URINARY TRACT SYMPTOMS, SYMPTOM DETAILS UNSPECIFIED: Primary | ICD-10-CM

## 2024-09-24 RX ORDER — FINASTERIDE 5 MG/1
1 TABLET, FILM COATED ORAL DAILY
Qty: 90 TABLET | Refills: 3 | Status: SHIPPED | OUTPATIENT
Start: 2024-09-24

## 2024-09-24 RX ORDER — TAMSULOSIN HYDROCHLORIDE 0.4 MG/1
0.4 CAPSULE ORAL AT BEDTIME
Qty: 90 CAPSULE | Refills: 3 | Status: SHIPPED | OUTPATIENT
Start: 2024-09-24

## 2024-09-24 NOTE — TELEPHONE ENCOUNTER
Refill prescription approved per [facility] Refill Protocol. Last OV= 09/2024.    Pending Prescriptions:                       Disp   Refills    tamsulosin (FLOMAX) 0.4 MG capsule        90 cap*3            Sig: Take 1 capsule (0.4 mg) by mouth at bedtime.    finasteride (PROSCAR) 5 MG tablet         90 tab*3            Sig: Take 1 tablet (5 mg) by mouth daily.      Jeanie ZACARIAS RN Urology 9/24/2024 11:19 AM

## 2025-03-17 ENCOUNTER — TRANSFERRED RECORDS (OUTPATIENT)
Dept: HEALTH INFORMATION MANAGEMENT | Facility: CLINIC | Age: 75
End: 2025-03-17
Payer: COMMERCIAL

## 2025-04-02 SDOH — HEALTH STABILITY: PHYSICAL HEALTH: ON AVERAGE, HOW MANY MINUTES DO YOU ENGAGE IN EXERCISE AT THIS LEVEL?: 70 MIN

## 2025-04-02 SDOH — HEALTH STABILITY: PHYSICAL HEALTH: ON AVERAGE, HOW MANY DAYS PER WEEK DO YOU ENGAGE IN MODERATE TO STRENUOUS EXERCISE (LIKE A BRISK WALK)?: 6 DAYS

## 2025-04-02 ASSESSMENT — SOCIAL DETERMINANTS OF HEALTH (SDOH): HOW OFTEN DO YOU GET TOGETHER WITH FRIENDS OR RELATIVES?: ONCE A WEEK

## 2025-04-03 ENCOUNTER — TRANSFERRED RECORDS (OUTPATIENT)
Dept: HEALTH INFORMATION MANAGEMENT | Facility: CLINIC | Age: 75
End: 2025-04-03
Payer: COMMERCIAL

## 2025-04-07 ENCOUNTER — OFFICE VISIT (OUTPATIENT)
Dept: FAMILY MEDICINE | Facility: CLINIC | Age: 75
End: 2025-04-07
Payer: COMMERCIAL

## 2025-04-07 VITALS
OXYGEN SATURATION: 98 % | TEMPERATURE: 96.8 F | SYSTOLIC BLOOD PRESSURE: 138 MMHG | RESPIRATION RATE: 16 BRPM | BODY MASS INDEX: 24.52 KG/M2 | HEART RATE: 72 BPM | DIASTOLIC BLOOD PRESSURE: 88 MMHG | WEIGHT: 181 LBS | HEIGHT: 72 IN

## 2025-04-07 DIAGNOSIS — I10 HYPERTENSION GOAL BP (BLOOD PRESSURE) < 140/90: ICD-10-CM

## 2025-04-07 DIAGNOSIS — Z00.00 ENCOUNTER FOR MEDICARE ANNUAL WELLNESS EXAM: Primary | ICD-10-CM

## 2025-04-07 DIAGNOSIS — E78.5 HYPERLIPIDEMIA LDL GOAL <130: ICD-10-CM

## 2025-04-07 DIAGNOSIS — G89.29 CHRONIC RIGHT-SIDED LOW BACK PAIN, UNSPECIFIED WHETHER SCIATICA PRESENT: ICD-10-CM

## 2025-04-07 DIAGNOSIS — H91.90 HEARING LOSS, UNSPECIFIED HEARING LOSS TYPE, UNSPECIFIED LATERALITY: ICD-10-CM

## 2025-04-07 DIAGNOSIS — M54.50 CHRONIC RIGHT-SIDED LOW BACK PAIN, UNSPECIFIED WHETHER SCIATICA PRESENT: ICD-10-CM

## 2025-04-07 DIAGNOSIS — M80.00XD AGE-RELATED OSTEOPOROSIS WITH CURRENT PATHOLOGICAL FRACTURE WITH ROUTINE HEALING, SUBSEQUENT ENCOUNTER: ICD-10-CM

## 2025-04-07 LAB
ANION GAP SERPL CALCULATED.3IONS-SCNC: 8 MMOL/L (ref 7–15)
BUN SERPL-MCNC: 23.6 MG/DL (ref 8–23)
CALCIUM SERPL-MCNC: 9.4 MG/DL (ref 8.8–10.4)
CHLORIDE SERPL-SCNC: 101 MMOL/L (ref 98–107)
CHOLEST SERPL-MCNC: 148 MG/DL
CREAT SERPL-MCNC: 0.75 MG/DL (ref 0.67–1.17)
EGFRCR SERPLBLD CKD-EPI 2021: >90 ML/MIN/1.73M2
FASTING STATUS PATIENT QL REPORTED: YES
FASTING STATUS PATIENT QL REPORTED: YES
GLUCOSE SERPL-MCNC: 113 MG/DL (ref 70–99)
HCO3 SERPL-SCNC: 32 MMOL/L (ref 22–29)
HDLC SERPL-MCNC: 57 MG/DL
LDLC SERPL CALC-MCNC: 78 MG/DL
NONHDLC SERPL-MCNC: 91 MG/DL
POTASSIUM SERPL-SCNC: 4.3 MMOL/L (ref 3.4–5.3)
SODIUM SERPL-SCNC: 141 MMOL/L (ref 135–145)
TRIGL SERPL-MCNC: 66 MG/DL

## 2025-04-07 PROCEDURE — 3079F DIAST BP 80-89 MM HG: CPT | Performed by: FAMILY MEDICINE

## 2025-04-07 PROCEDURE — 36415 COLL VENOUS BLD VENIPUNCTURE: CPT | Performed by: FAMILY MEDICINE

## 2025-04-07 PROCEDURE — G2211 COMPLEX E/M VISIT ADD ON: HCPCS | Performed by: FAMILY MEDICINE

## 2025-04-07 PROCEDURE — 99214 OFFICE O/P EST MOD 30 MIN: CPT | Mod: 25 | Performed by: FAMILY MEDICINE

## 2025-04-07 PROCEDURE — G0439 PPPS, SUBSEQ VISIT: HCPCS | Performed by: FAMILY MEDICINE

## 2025-04-07 PROCEDURE — 3075F SYST BP GE 130 - 139MM HG: CPT | Performed by: FAMILY MEDICINE

## 2025-04-07 PROCEDURE — 80061 LIPID PANEL: CPT | Performed by: FAMILY MEDICINE

## 2025-04-07 PROCEDURE — 80048 BASIC METABOLIC PNL TOTAL CA: CPT | Performed by: FAMILY MEDICINE

## 2025-04-07 PROCEDURE — 1126F AMNT PAIN NOTED NONE PRSNT: CPT | Performed by: FAMILY MEDICINE

## 2025-04-07 RX ORDER — GABAPENTIN 100 MG/1
100 CAPSULE ORAL 2 TIMES DAILY
Qty: 180 CAPSULE | Refills: 3 | Status: SHIPPED | OUTPATIENT
Start: 2025-04-07

## 2025-04-07 RX ORDER — HYDROCHLOROTHIAZIDE 25 MG/1
TABLET ORAL
Qty: 90 TABLET | Refills: 3 | Status: SHIPPED | OUTPATIENT
Start: 2025-04-07

## 2025-04-07 RX ORDER — POTASSIUM CHLORIDE 750 MG/1
TABLET, EXTENDED RELEASE ORAL
Qty: 90 TABLET | Refills: 3 | Status: SHIPPED | OUTPATIENT
Start: 2025-04-07

## 2025-04-07 RX ORDER — ALENDRONATE SODIUM 70 MG/1
70 TABLET ORAL WEEKLY
Qty: 4 TABLET | Refills: 1 | Status: SHIPPED | OUTPATIENT
Start: 2025-04-07

## 2025-04-07 RX ORDER — LOSARTAN POTASSIUM 25 MG/1
25 TABLET ORAL DAILY
Qty: 90 TABLET | Refills: 3 | Status: SHIPPED | OUTPATIENT
Start: 2025-04-07

## 2025-04-07 RX ORDER — SIMVASTATIN 10 MG
TABLET ORAL
Qty: 90 TABLET | Refills: 3 | Status: SHIPPED | OUTPATIENT
Start: 2025-04-07

## 2025-04-07 RX ORDER — AMLODIPINE BESYLATE 2.5 MG/1
2.5 TABLET ORAL DAILY
Qty: 90 TABLET | Refills: 3 | Status: SHIPPED | OUTPATIENT
Start: 2025-04-07

## 2025-04-07 SDOH — HEALTH STABILITY: PHYSICAL HEALTH: ON AVERAGE, HOW MANY DAYS PER WEEK DO YOU ENGAGE IN MODERATE TO STRENUOUS EXERCISE (LIKE A BRISK WALK)?: 6 DAYS

## 2025-04-07 SDOH — HEALTH STABILITY: PHYSICAL HEALTH: ON AVERAGE, HOW MANY MINUTES DO YOU ENGAGE IN EXERCISE AT THIS LEVEL?: 70 MIN

## 2025-04-07 ASSESSMENT — PAIN SCALES - GENERAL: PAINLEVEL_OUTOF10: NO PAIN (0)

## 2025-04-07 ASSESSMENT — SOCIAL DETERMINANTS OF HEALTH (SDOH): HOW OFTEN DO YOU GET TOGETHER WITH FRIENDS OR RELATIVES?: ONCE A WEEK

## 2025-04-07 NOTE — PROGRESS NOTES
Preventive Care Visit  [facility] TATIANA Deulca MD, Family Medicine  Apr 7, 2025      Assessment & Plan     Encounter for Medicare annual wellness exam      Age-related osteoporosis with current pathological fracture with routine healing, subsequent encounter  Continue   - alendronate (FOSAMAX) 70 MG tablet; Take 1 tablet (70 mg) by mouth once a week.    Hypertension goal BP (blood pressure) < 140/90  Stable recheck  Pt will Monitor BP at Home and follow up if over 140/90   - BASIC METABOLIC PANEL; Future  - amLODIPine (NORVASC) 2.5 MG tablet; Take 1 tablet (2.5 mg) by mouth daily.  - hydrochlorothiazide (HYDRODIURIL) 25 MG tablet; TAKE 1 TABLET BY MOUTH ONCE DAILY .  - losartan (COZAAR) 25 MG tablet; Take 1 tablet (25 mg) by mouth daily.  - potassium chloride ER (K-TAB/KLOR-CON) 10 MEQ CR tablet; TAKE 1 TABLET BY MOUTH ONCE DAILY .    Chronic right-sided low back pain, unspecified whether sciatica present  Refilled  Stable   - gabapentin (NEURONTIN) 100 MG capsule; Take 1 capsule (100 mg) by mouth 2 times daily.    Hyperlipidemia LDL goal <130  Pending   - Lipid panel reflex to direct LDL Non-fasting; Future  - simvastatin (ZOCOR) 10 MG tablet; TAKE 1 TABLET BY MOUTH AT BEDTIME .    Hearing loss, unspecified hearing loss type, unspecified laterality  Referral   - Adult Audiology  Referral; Future            Counseling  Appropriate preventive services were addressed with this patient via screening, questionnaire, or discussion as appropriate for fall prevention, nutrition, physical activity, Tobacco-use cessation, social engagement, weight loss and cognition.  Checklist reviewing preventive services available has been given to the patient.  Reviewed patient's diet, addressing concerns and/or questions.   Discussed possible causes of fatigue.     Regular exercise    Subjective   Steve is a 74 year old, presenting for the following:  Physical        4/7/2025     8:51 AM   Additional  Questions   Roomed by Linsey HAMMER  Pt here for physical and check on medical conditions     Hyperlipidemia Follow-Up    Are you regularly taking any medication or supplement to lower your cholesterol?   statins  Are you having muscle aches or other side effects that you think could be caused by your cholesterol lowering medication?  No    Hypertension Follow-up    Do you check your blood pressure regularly outside of the clinic? Yes   Are you following a low salt diet? Yes  Are your blood pressures ever more than 140 on the top number (systolic) OR more   than 90 on the bottom number (diastolic), for example 140/90? Yes-occasionally but then it comes down     BP Readings from Last 2 Encounters:   04/07/25 138/88   09/13/24 (!) 150/84       Advance Care Planning  Patient does not have a Health Care Directive: Discussed advance care planning with patient; information given to patient to review.      4/7/2025   General Health   How would you rate your overall physical health? Good   Feel stress (tense, anxious, or unable to sleep) Not at all         4/7/2025   Nutrition   Diet: Regular (no restrictions)         4/7/2025   Exercise   Days per week of moderate/strenous exercise 6 days   Average minutes spent exercising at this level 70 min         4/7/2025   Social Factors   Frequency of gathering with friends or relatives Once a week   Worry food won't last until get money to buy more No   Food not last or not have enough money for food? No   Do you have housing? (Housing is defined as stable permanent housing and does not include staying ouside in a car, in a tent, in an abandoned building, in an overnight shelter, or couch-surfing.) Yes   Are you worried about losing your housing? No   Lack of transportation? No   Unable to get utilities (heat,electricity)? No         4/7/2025   Fall Risk   Fallen 2 or more times in the past year? No    Trouble with walking or balance? No        Proxy-reported           4/7/2025   Activities of Daily Living- Home Safety   Needs help with the following daily activites None of the above   Safety concerns in the home None of the above         4/7/2025   Dental   Dentist two times every year? Yes         4/7/2025   Hearing Screening   Hearing concerns? None of the above         4/7/2025   Driving Risk Screening   Patient/family members have concerns about driving No         4/7/2025   General Alertness/Fatigue Screening   Have you been more tired than usual lately? (!) YES         4/7/2025   Urinary Incontinence Screening   Bothered by leaking urine in past 6 months No           4/1/2024   TB Screening   Were you born outside of the [place]? No           Today's PHQ-2 Score:       4/7/2025     8:33 AM   PHQ-2 ( 1999 Pf[employer])   Q1: Little interest or pleasure in doing things 0   Q2: Feeling down, depressed or hopeless 0   PHQ-2 Score 0    Q1: Little interest or pleasure in doing things Not at all   Q2: Feeling down, depressed or hopeless Not at all   PHQ-2 Score 0       Patient-reported           4/7/2025   Substance Use   Alcohol more than 3/day or more than 7/wk No   Do you have a current opioid prescription? No   How severe/bad is pain from 1 to 10? 3/10   Do you use any other substances recreationally? No     Social History     Tobacco Use    Smoking status: Never     Passive exposure: Never    Smokeless tobacco: Never   Vaping Use    Vaping status: Never Used   Substance Use Topics    Alcohol use: Not Currently     Comment: rare    Drug use: No       ASCVD Risk   The 10-year ASCVD risk score (Tea ZAMBRANO, et al., 2019) is: 29.4%    Values used to calculate the score:      Age: 74 years      Sex: Male      Is Non- : No      Diabetic: No      Tobacco smoker: No      Systolic Blood Pressure: 138 mmHg      Is BP treated: Yes      HDL Cholesterol: 64 mg/dL      Total Cholesterol: 155 mg/dL    Fracture Risk Assessment Tool  Link to Frax Calculator  Use the  information below to complete the Frax calculator  : 1950  Sex: male  Weight (kg): 82.1 kg (actual weight)  Height (cm): 183.1 cm  Previous Fragility Fracture:  Yes    Current Smoking:  No  Patient has been on glucocorticoids for more than 3 months (5mg/day or more): No  Rheumatoid Arthritis on Problem List:  No  Secondary Osteoporosis on Problem List:  No  Consumes 3 or more units of alcohol per day: No  Femoral Neck BMD (g/cm2)            Reviewed and updated as needed this visit by Provider   Tobacco  Allergies  Meds  Problems  Med Hx  Surg Hx  Fam Hx            Past Medical History:   Diagnosis Date    BPH (benign prostatic hypertrophy)     Chronic low back pain     Colon polyp     Hyperlipidemia     Hypertension     Prostatitis     recurrent     Past Surgical History:   Procedure Laterality Date    ABDOMEN SURGERY      Right ureter reimplantation    APPENDECTOMY      BIOPSY  five times    Prostate    COLONOSCOPY  2018    HERNIORRHAPHY INGUINAL Right 2022    Procedure: OPEN RIGHT INGUINAL HERNIORRHAPHY WITH MESH;  Surgeon: Phu Melo MD;  Location: [facility] OR    HRW CORNEAL TRANSPLANT, CRNA      LASIK      TURP  1998    x 2    [facility] GENITAL SURG PROC,MALE UNLISTED      left hydrocele    [facility] GENITAL SURG PROC,MALE UNLISTED      right ureteral reimplantation     OB History   No obstetric history on file.     Lab work is in process  Labs reviewed in EPIC  BP Readings from Last 3 Encounters:   25 138/88   24 (!) 150/84   24 134/80    Wt Readings from Last 3 Encounters:   25 82.1 kg (181 lb)   24 87.1 kg (192 lb)   24 81.6 kg (180 lb)                  Patient Active Problem List   Diagnosis    GERD (gastroesophageal reflux disease)    Hyperlipidemia LDL goal <130    Hypertension goal BP (blood pressure) < 140/90    Hypertrophy of prostate with urinary obstruction    Chronic right-sided low back pain, unspecified whether sciatica  present    Closed fracture of left hip with routine healing, subsequent encounter    S/P total left hip arthroplasty     Past Surgical History:   Procedure Laterality Date    ABDOMEN SURGERY  1978    Right ureter reimplantation    APPENDECTOMY      BIOPSY  five times    Prostate    COLONOSCOPY  1/6/2018    HERNIORRHAPHY INGUINAL Right 2/2/2022    Procedure: OPEN RIGHT INGUINAL HERNIORRHAPHY WITH MESH;  Surgeon: Phu Melo MD;  Location: [facility] OR    HRW CORNEAL TRANSPLANT, CRNA      LASIK  2005    TURP  1998    x 2    [facility] GENITAL SURG PROC,MALE UNLISTED  2001    left hydrocele    [facility] GENITAL SURG PROC,MALE UNLISTED  1977    right ureteral reimplantation       Social History     Tobacco Use    Smoking status: Never     Passive exposure: Never    Smokeless tobacco: Never   Substance Use Topics    Alcohol use: Not Currently     Comment: rare     Family History   Problem Relation Age of Onset    C.A.D. Father 64        d. MI    Hypertension Father     Hypertension Mother     Cancer Mother         skin    Cerebrovascular Disease Mother     Diabetes No family hx of     Cancer - colorectal No family hx of          Current Outpatient Medications   Medication Sig Dispense Refill    alendronate (FOSAMAX) 70 MG tablet Take 1 tablet (70 mg) by mouth once a week. 4 tablet 1    amLODIPine (NORVASC) 2.5 MG tablet Take 1 tablet (2.5 mg) by mouth daily. 90 tablet 3    finasteride (PROSCAR) 5 MG tablet Take 1 tablet (5 mg) by mouth daily. 90 tablet 3    gabapentin (NEURONTIN) 100 MG capsule Take 1 capsule (100 mg) by mouth 2 times daily. 180 capsule 3    hydrochlorothiazide (HYDRODIURIL) 25 MG tablet TAKE 1 TABLET BY MOUTH ONCE DAILY . 90 tablet 3    losartan (COZAAR) 25 MG tablet Take 1 tablet (25 mg) by mouth daily. 90 tablet 3    Multiple Vitamins-Minerals (MULTI FOR HIM PO) Take  by mouth daily.      omeprazole (PRILOSEC) 20 MG DR capsule Take 20 mg by mouth daily      potassium chloride ER (K-TAB/KLOR-CON) 10 MEQ CR  tablet TAKE 1 TABLET BY MOUTH ONCE DAILY . 90 tablet 3    simvastatin (ZOCOR) 10 MG tablet TAKE 1 TABLET BY MOUTH AT BEDTIME . 90 tablet 3    tamsulosin (FLOMAX) 0.4 MG capsule Take 1 capsule (0.4 mg) by mouth at bedtime. 90 capsule 3     No Known Allergies  Recent Labs   Lab Test 04/08/24  0906 05/02/23  1104 04/10/23  0952 01/24/22  1154 01/24/22  1154 02/08/21  0941 04/23/20  1044 04/08/19  0927 10/22/18  0912   LDL 77  --  53  --  62 70 67 55 67   HDL 64  --  66  --  63 64 70 66 60   TRIG 71  --  83  --  58 70 56 62 60   ALT  --   --   --   --   --   --  28 23 26   CR 0.84 0.76  --    < > 0.78 0.86 0.86 0.90 0.86   GFRESTIMATED >90 >90  --    < > >90 86 87 86 88   GFRESTBLACK  --   --   --   --   --  >90 >90 >90 >90   POTASSIUM 4.1 4.0  --    < > 4.2 4.1 4.4 4.0 3.9    < > = values in this interval not displayed.           Current providers sharing in care for this patient include:  Patient Care Team:  Bita Deluca MD as PCP - General  Bita Deluca MD as Assigned PCP  Talya Barlow MD as MD (Dermatology)  Semaj Sotelo MD as MD (Urology)  Semaj Sotelo MD as Assigned Surgical Provider    The following health maintenance items are reviewed in Epic and correct as of today:  Health Maintenance   Topic Date Due    BMP  04/08/2025    LIPID  04/08/2025    COVID-19 Vaccine (10 - 2024-25 season) 06/02/2025    COLORECTAL CANCER SCREENING  01/31/2026    MEDICARE ANNUAL WELLNESS VISIT  04/07/2026    ANNUAL REVIEW OF HM ORDERS  04/07/2026    FALL RISK ASSESSMENT  04/07/2026    DIABETES SCREENING  04/08/2027    DTAP/TDAP/TD IMMUNIZATION (3 - Td or Tdap) 03/20/2028    ADVANCE CARE PLANNING  04/07/2030    HEPATITIS C SCREENING  Completed    PHQ-2 (once per calendar year)  Completed    INFLUENZA VACCINE  Completed    Pneumococcal Vaccine: 50+ Years  Completed    ZOSTER IMMUNIZATION  Completed    RSV VACCINE  Completed    HPV IMMUNIZATION  Aged Out    MENINGITIS IMMUNIZATION  Aged Out         Review of  "Systems  CONSTITUTIONAL: NEGATIVE for fever, chills, change in weight  INTEGUMENTARY/SKIN: NEGATIVE for worrisome rashes, moles or lesions  EYES: NEGATIVE for vision changes or irritation  ENT/MOUTH: NEGATIVE for ear, mouth and throat problems  RESP: NEGATIVE for significant cough or SOB  BREAST: NEGATIVE for masses, tenderness or discharge  CV: NEGATIVE for chest pain, palpitations or peripheral edema  GI: NEGATIVE for nausea, abdominal pain, heartburn, or change in bowel habits  : NEGATIVE for frequency, dysuria, or hematuria  MUSCULOSKELETAL: NEGATIVE for significant arthralgias or myalgia  NEURO: NEGATIVE for weakness, dizziness or paresthesias  ENDOCRINE: NEGATIVE for temperature intolerance, skin/hair changes  HEME: NEGATIVE for bleeding problems  PSYCHIATRIC: NEGATIVE for changes in mood or affect     Objective    Exam  /88   Pulse 72   Temp 96.8  F (36  C) (Temporal)   Resp 16   Ht 1.831 m (6' 0.09\")   Wt 82.1 kg (181 lb)   SpO2 98%   BMI 24.49 kg/m     Estimated body mass index is 24.49 kg/m  as calculated from the following:    Height as of this encounter: 1.831 m (6' 0.09\").    Weight as of this encounter: 82.1 kg (181 lb).    Physical Exam  GENERAL: alert and no distress  EYES: Eyes grossly normal to inspection, PERRL and conjunctivae and sclerae normal  HENT: ear canals and TM's normal, nose and mouth without ulcers or lesions  NECK: no adenopathy, no asymmetry, masses, or scars  RESP: lungs clear to auscultation - no rales, rhonchi or wheezes  CV: regular rate and rhythm, normal S1 S2, no S3 or S4, no murmur, click or rub, no peripheral edema  ABDOMEN: soft, nontender, no hepatosplenomegaly, no masses and bowel sounds normal  MS: no gross musculoskeletal defects noted, no edema  SKIN: no suspicious lesions or rashes  NEURO: Normal strength and tone, mentation intact and speech normal  PSYCH: mentation appears normal, affect normal/bright        4/7/2025   Mini Cog   Clock Draw Score 2 " Normal   3 Item Recall 3 objects recalled   Mini Cog Total Score 5              Signed Electronically by: Bita Deluca MD

## 2025-04-07 NOTE — PATIENT INSTRUCTIONS
Patient Education   Preventive Care Advice   This is general advice given by our system to help you stay healthy. However, your care team may have specific advice just for you. Please talk to your care team about your preventive care needs.  Nutrition  Eat 5 or more servings of fruits and vegetables each day.  Try wheat bread, brown rice and whole grain pasta (instead of white bread, rice, and pasta).  Get enough calcium and vitamin D. Check the label on foods and aim for 100% of the RDA (recommended daily allowance).  Lifestyle  Exercise at least 150 minutes each week  (30 minutes a day, 5 days a week).  Do muscle strengthening activities 2 days a week. These help control your weight and prevent disease.  No smoking.  Wear sunscreen to prevent skin cancer.  Have a dental exam and cleaning every 6 months.  Yearly exams  See your health care team every year to talk about:  Any changes in your health.  Any medicines your care team has prescribed.  Preventive care, family planning, and ways to prevent chronic diseases.  Shots (vaccines)   HPV shots (up to age 26), if you've never had them before.  Hepatitis B shots (up to age 59), if you've never had them before.  COVID-19 shot: Get this shot when it's due.  Flu shot: Get a flu shot every year.  Tetanus shot: Get a tetanus shot every 10 years.  Pneumococcal, hepatitis A, and RSV shots: Ask your care team if you need these based on your risk.  Shingles shot (for age 50 and up)  General health tests  Diabetes screening:  Starting at age 35, Get screened for diabetes at least every 3 years.  If you are younger than age 35, ask your care team if you should be screened for diabetes.  Cholesterol test: At age 39, start having a cholesterol test every 5 years, or more often if advised.  Bone density scan (DEXA): At age 50, ask your care team if you should have this scan for osteoporosis (brittle bones).  Hepatitis C: Get tested at least once in your life.  STIs (sexually  transmitted infections)  Before age 24: Ask your care team if you should be screened for STIs.  After age 24: Get screened for STIs if you're at risk. You are at risk for STIs (including HIV) if:  You are sexually active with more than one person.  You don't use condoms every time.  You or a partner was diagnosed with a sexually transmitted infection.  If you are at risk for HIV, ask about PrEP medicine to prevent HIV.  Get tested for HIV at least once in your life, whether you are at risk for HIV or not.  Cancer screening tests  Cervical cancer screening: If you have a cervix, begin getting regular cervical cancer screening tests starting at age 21.  Breast cancer scan (mammogram): If you've ever had breasts, begin having regular mammograms starting at age 40. This is a scan to check for breast cancer.  Colon cancer screening: It is important to start screening for colon cancer at age 45.  Have a colonoscopy test every 10 years (or more often if you're at risk) Or, ask your provider about stool tests like a FIT test every year or Cologuard test every 3 years.  To learn more about your testing options, visit:   .  For help making a decision, visit:   https://bit.ly/pi32324.  Prostate cancer screening test: If you have a prostate, ask your care team if a prostate cancer screening test (PSA) at age 55 is right for you.  Lung cancer screening: If you are a current or former smoker ages 50 to 80, ask your care team if ongoing lung cancer screenings are right for you.  For informational purposes only. Not to replace the advice of your health care provider. Copyright   2023 [facility]. All rights reserved. Clinically reviewed by the [facility]. [employer] 160337 - REV 01/24.  Learning About Sleeping Well  What does sleeping well mean?     Sleeping well means getting enough sleep to feel good and stay healthy. How much sleep is enough varies among people.  The number of hours you  sleep and how you feel when you wake up are both important. If you do not feel refreshed, you probably need more sleep. Another sign of not getting enough sleep is feeling tired during the day.  Experts recommend that adults get at least 7 or more hours of sleep per day. Children and older adults need more sleep.  Why is getting enough sleep important?  Getting enough quality sleep is a basic part of good health. When your sleep suffers, your physical health, mood, and your thoughts can suffer too. You may find yourself feeling more grumpy or stressed. Not getting enough sleep also can lead to serious problems, including injury, accidents, anxiety, and depression.  What might cause poor sleeping?  Many things can cause sleep problems, including:  Changes to your sleep schedule.  Stress. Stress can be caused by fear about a single event, such as giving a speech. Or you may have ongoing stress, such as worry about work or school.  Depression, anxiety, and other mental or emotional conditions.  Changes in your sleep habits or surroundings. This includes changes that happen where you sleep, such as noise, light, or sleeping in a different bed. It also includes changes in your sleep pattern, such as having jet lag or working a late shift.  Health problems, such as pain, breathing problems, and restless legs syndrome.  Lack of regular exercise.  Using alcohol, nicotine, or caffeine before bed.  How can you help yourself?  Here are some tips that may help you sleep more soundly and wake up feeling more refreshed.  Your sleeping area   Use your bedroom only for sleeping and sex. A bit of light reading may help you fall asleep. But if it doesn't, do your reading elsewhere in the house. Try not to use your TV, computer, smartphone, or tablet while you are in bed.  Be sure your bed is big enough to stretch out comfortably, especially if you have a sleep partner.  Keep your bedroom quiet, dark, and cool. Use curtains, blinds,  "or a sleep mask to block out light. To block out noise, use earplugs, soothing music, or a \"white noise\" machine.  Your evening and bedtime routine   Create a relaxing bedtime routine. You might want to take a warm shower or bath, or listen to soothing music.  Go to bed at the same time every night. And get up at the same time every morning, even if you feel tired.  What to avoid   Limit caffeine (coffee, tea, caffeinated sodas) during the day, and don't have any for at least 6 hours before bedtime.  Avoid drinking alcohol before bedtime. Alcohol can cause you to wake up more often during the night.  Try not to smoke or use tobacco, especially in the evening. Nicotine can keep you awake.  Limit naps during the day, especially close to bedtime.  Avoid lying in bed awake for too long. If you can't fall asleep or if you wake up in the middle of the night and can't get back to sleep within about 20 minutes, get out of bed and go to another room until you feel sleepy.  Avoid taking medicine right before bed that may keep you awake or make you feel hyper or energized. Your doctor can tell you if your medicine may do this and if you can take it earlier in the day.  If you can't sleep   Imagine yourself in a peaceful, pleasant scene. Focus on the details and feelings of being in a place that is relaxing.  Get up and do a quiet or boring activity until you feel sleepy.  Avoid drinking any liquids before going to bed to help prevent waking up often to use the bathroom.  Where can you learn more?  Go to https://www.[employer].net/patiented  Enter J942 in the search box to learn more about \"Learning About Sleeping Well.\"  Current as of: July 31, 2024  Content Version: 14.4    7584-9664 [employer].   Care instructions adapted under license by your healthcare professional. If you have questions about a medical condition or this instruction, always ask your healthcare professional. [employer] disclaims any " warranty or liability for your use of this information.

## 2025-04-17 ENCOUNTER — TRANSFERRED RECORDS (OUTPATIENT)
Dept: HEALTH INFORMATION MANAGEMENT | Facility: CLINIC | Age: 75
End: 2025-04-17
Payer: COMMERCIAL

## 2025-04-28 ENCOUNTER — OFFICE VISIT (OUTPATIENT)
Dept: AUDIOLOGY | Facility: CLINIC | Age: 75
End: 2025-04-28
Attending: FAMILY MEDICINE
Payer: COMMERCIAL

## 2025-04-28 DIAGNOSIS — H91.90 HEARING LOSS, UNSPECIFIED HEARING LOSS TYPE, UNSPECIFIED LATERALITY: ICD-10-CM

## 2025-04-28 DIAGNOSIS — H90.3 SENSORINEURAL HEARING LOSS, ASYMMETRICAL: Primary | ICD-10-CM

## 2025-04-28 PROCEDURE — 92557 COMPREHENSIVE HEARING TEST: CPT

## 2025-04-28 PROCEDURE — 92550 TYMPANOMETRY & REFLEX THRESH: CPT

## 2025-04-28 NOTE — PROGRESS NOTES
AUDIOLOGY REPORT    SUBJECTIVE:  Steve José is a 74 year old male who was seen in the Audiology Clinic at the [facility] for audiologic evaluation, referred by Bita Deluca M.D. The patient has been seen previously in this clinic on 11/11/2013 for assessment and results indicated a bilateral sensorineural hearing loss with left slightly worse than right. The patient reports a gradual decline in hearing and may be interested in amplification. The patient denies dizziness, history of ear surgeries, bilateral tinnitus, bilateral otalgia, bilateral drainage, and bilateral aural fullness. The patient notes difficulty with communication in a variety of listening situations. He was not accompanied to today's appointment     OBJECTIVE:  Otoscopic exam indicates ears are clear of cerumen bilaterally     Pure Tone Thresholds assessed using conventional audiometry with good  reliability from 250-8000 Hz bilaterally using insert earphones and circumaural headphones     RIGHT:  normal sloping to moderate-severe sensorineural hearing loss    LEFT:    normal sloping to moderate-severe sensorineural hearing loss    Tympanogram:    RIGHT: normal eardrum mobility    LEFT:   normal eardrum mobility    Reflexes (reported by stimulus ear):  RIGHT: Ipsilateral is present at normal levels  RIGHT: Contralateral is present at normal levels  LEFT:   Ipsilateral is present at normal levels  LEFT:   Contralateral is present at normal levels      Speech Reception Threshold:    RIGHT: 20 dB HL    LEFT:   25 dB HL  Word Recognition Score:     RIGHT: 100% at 65 dB HL using NU-6 recorded word list.    LEFT:   96% at 65 dB HL using NU-6 recorded word list.      ASSESSMENT:   Compared to patient's previous audiogram dated 11/11/2013, hearing has remained stable from 250-1000 Hz and has declined from 1166-3204 Hz in both ears. Steve has a longstanding history of left hearing being worse than right. Today s results were  discussed with the patient in detail.     PLAN:  Patient was counseled regarding hearing loss and impact on communication.  Patient is a good candidate for amplification at this time. It is recommended that the patient return every 2-3 years for an updated audiogram- sooner with changes/concerns. Should Steve become interested in amplification, it is recommended he return for a hearing aid consultation.  Please call this clinic with questions regarding these results or recommendations.        Marli Barron., [facility]  Licensed Audiologist  [place] #[id number]

## 2025-05-30 DIAGNOSIS — M80.00XD AGE-RELATED OSTEOPOROSIS WITH CURRENT PATHOLOGICAL FRACTURE WITH ROUTINE HEALING, SUBSEQUENT ENCOUNTER: ICD-10-CM

## 2025-06-01 ENCOUNTER — HEALTH MAINTENANCE LETTER (OUTPATIENT)
Age: 75
End: 2025-06-01

## 2025-06-02 RX ORDER — ALENDRONATE SODIUM 70 MG/1
70 TABLET ORAL WEEKLY
Qty: 4 TABLET | Refills: 11 | Status: SHIPPED | OUTPATIENT
Start: 2025-06-02

## (undated) DEVICE — DRAPE LAP W/ARMBOARD 29410

## (undated) DEVICE — PREP CHLORAPREP 26ML TINTED ORANGE  260815

## (undated) DEVICE — DRSG STERI STRIP 1/2X4" R1547

## (undated) DEVICE — DRSG TEGADERM 4X4 3/4" 1626W

## (undated) DEVICE — PACK MINOR SBA15MIFSE

## (undated) DEVICE — SUCTION TIP YANKAUER W/O VENT K86

## (undated) DEVICE — SU VICRYL 3-0 SH 27" UND J416H

## (undated) DEVICE — DRAIN PENROSE 0.25"X18" LATEX FREE GR201

## (undated) DEVICE — SU VICRYL 0 CT-2 27" UND J270H

## (undated) DEVICE — SU MONOCRYL 4-0 PS-2 18" UND Y496G

## (undated) DEVICE — DRAPE IOBAN INCISE 23X17" 6650EZ

## (undated) DEVICE — SOL WATER IRRIG 1000ML BOTTLE 07139-09

## (undated) DEVICE — GLOVE PROTEXIS W/NEU-THERA 7.5  2D73TE75

## (undated) DEVICE — SOL ADH LIQUID BENZOIN SWAB 0.6ML C1544

## (undated) DEVICE — SYR 10ML LL W/O NDL

## (undated) DEVICE — DRSG ABDOMINAL 07 1/2X8" 7197D

## (undated) DEVICE — BNDG ABDOMINAL BINDER 09X46-62"

## (undated) DEVICE — DECANTER TRANSFER DEVICE 2008S

## (undated) RX ORDER — LABETALOL HYDROCHLORIDE 5 MG/ML
INJECTION, SOLUTION INTRAVENOUS
Status: DISPENSED
Start: 2022-02-02

## (undated) RX ORDER — GLYCOPYRROLATE 0.2 MG/ML
INJECTION, SOLUTION INTRAMUSCULAR; INTRAVENOUS
Status: DISPENSED
Start: 2022-02-02

## (undated) RX ORDER — PROPOFOL 10 MG/ML
INJECTION, EMULSION INTRAVENOUS
Status: DISPENSED
Start: 2022-02-02

## (undated) RX ORDER — OXYCODONE HYDROCHLORIDE 5 MG/1
TABLET ORAL
Status: DISPENSED
Start: 2022-02-02

## (undated) RX ORDER — LIDOCAINE HYDROCHLORIDE 20 MG/ML
INJECTION, SOLUTION INFILTRATION; PERINEURAL
Status: DISPENSED
Start: 2022-02-02

## (undated) RX ORDER — CEFAZOLIN SODIUM 2 G/100ML
INJECTION, SOLUTION INTRAVENOUS
Status: DISPENSED
Start: 2022-02-02

## (undated) RX ORDER — FENTANYL CITRATE 50 UG/ML
INJECTION, SOLUTION INTRAMUSCULAR; INTRAVENOUS
Status: DISPENSED
Start: 2022-02-02

## (undated) RX ORDER — ONDANSETRON 2 MG/ML
INJECTION INTRAMUSCULAR; INTRAVENOUS
Status: DISPENSED
Start: 2022-02-02

## (undated) RX ORDER — CEFAZOLIN SODIUM 1 G/3ML
INJECTION, POWDER, FOR SOLUTION INTRAMUSCULAR; INTRAVENOUS
Status: DISPENSED
Start: 2022-02-02

## (undated) RX ORDER — FENTANYL CITRATE-0.9 % NACL/PF 10 MCG/ML
PLASTIC BAG, INJECTION (ML) INTRAVENOUS
Status: DISPENSED
Start: 2022-02-02

## (undated) RX ORDER — ACETAMINOPHEN 325 MG/1
TABLET ORAL
Status: DISPENSED
Start: 2022-02-02

## (undated) RX ORDER — EPHEDRINE SULFATE 50 MG/ML
INJECTION, SOLUTION INTRAMUSCULAR; INTRAVENOUS; SUBCUTANEOUS
Status: DISPENSED
Start: 2022-02-02